# Patient Record
Sex: FEMALE | Race: WHITE | Employment: OTHER | URBAN - METROPOLITAN AREA
[De-identification: names, ages, dates, MRNs, and addresses within clinical notes are randomized per-mention and may not be internally consistent; named-entity substitution may affect disease eponyms.]

---

## 2017-01-01 ENCOUNTER — TRANSFERRED RECORDS (OUTPATIENT)
Dept: HEALTH INFORMATION MANAGEMENT | Facility: CLINIC | Age: 72
End: 2017-01-01

## 2017-01-01 ENCOUNTER — TELEPHONE (OUTPATIENT)
Dept: FAMILY MEDICINE | Facility: CLINIC | Age: 72
End: 2017-01-01

## 2017-01-01 ENCOUNTER — OFFICE VISIT (OUTPATIENT)
Dept: FAMILY MEDICINE | Facility: CLINIC | Age: 72
End: 2017-01-01
Payer: COMMERCIAL

## 2017-01-01 VITALS
BODY MASS INDEX: 18.61 KG/M2 | RESPIRATION RATE: 20 BRPM | HEART RATE: 106 BPM | DIASTOLIC BLOOD PRESSURE: 76 MMHG | HEIGHT: 63 IN | OXYGEN SATURATION: 95 % | SYSTOLIC BLOOD PRESSURE: 144 MMHG | TEMPERATURE: 98.5 F | WEIGHT: 105 LBS

## 2017-01-01 DIAGNOSIS — H61.21 EXCESSIVE EAR WAX, RIGHT: Primary | ICD-10-CM

## 2017-01-01 PROCEDURE — 69209 REMOVE IMPACTED EAR WAX UNI: CPT | Mod: RT | Performed by: FAMILY MEDICINE

## 2017-02-09 ENCOUNTER — TRANSFERRED RECORDS (OUTPATIENT)
Dept: HEALTH INFORMATION MANAGEMENT | Facility: CLINIC | Age: 72
End: 2017-02-09

## 2017-05-15 DIAGNOSIS — M25.562 LEFT KNEE PAIN, UNSPECIFIED CHRONICITY: Primary | ICD-10-CM

## 2017-05-15 NOTE — TELEPHONE ENCOUNTER
Reason for Call:  Medication or medication refill: ketoprofen 10% in PLO 10%    Do you use a [facility]?  Name of the pharmacy and phone number for the current request:  [facility] [place] - [phone]    Name of the medication requested: ketoprofen 10% in PLO 10%    Other request: NA    Can we leave a detailed message on this number? YES    Phone number patient can be reached at: Home number on file [phone] (home)    Best Time: anytime    Call taken on 5/15/2017 at 9:18 AM by Maverick Montano

## 2017-05-17 NOTE — TELEPHONE ENCOUNTER
ketoprofen 10% in PLO 10%  Last cr was low. Needs an annual cr for this prot.  Routing to pcp.  Signed a future lab.  [facility]    Last Written Prescription Date: 3/12/15  Last Fill Quantity: 30 g, # refills: 3  Last Office Visit with [facility], P or [facility] prescribing provider: 7/8/15       Creatinine   Date Value Ref Range Status   05/13/2015 0.47 (L) 0.52 - 1.04 mg/dL Final

## 2017-06-15 ENCOUNTER — HOSPITAL ENCOUNTER (OUTPATIENT)
Dept: MAMMOGRAPHY | Facility: CLINIC | Age: 72
Discharge: HOME | End: 2017-06-15
Admitting: NURSE PRACTITIONER
Payer: COMMERCIAL

## 2017-06-15 DIAGNOSIS — Z00.00 PREVENTATIVE HEALTH CARE: ICD-10-CM

## 2017-06-15 PROCEDURE — G0202 SCR MAMMO BI INCL CAD: HCPCS

## 2017-07-06 ENCOUNTER — TELEPHONE (OUTPATIENT)
Dept: FAMILY MEDICINE | Facility: CLINIC | Age: 72
End: 2017-07-06

## 2017-07-06 NOTE — TELEPHONE ENCOUNTER
Patient called and spoke with writer.    Per patient:  1. Has appt on 7/11/17  2. Rash around ankles   A. Noticed rash yesterday-small red area   B. Spread a little bit   C. No open areas, no itching, no pain  3. Did change gupta detergent about 1 week ago  4. Noticed rash after wearing socks  5. Has COPD so would like a follow up for that   A. Cough from COPD.  It is not worsening nor increasing in frequency    Writer offered patient multiple appts tomorrow and patient declined, stated 7/11/17 works best for her to be seen.    Writer recommended:  1. Switch to a very mild, unscented laundry detergent  2. Can take an anti-histamine, such as Benadryl.  Follow medication label instructions and do not drive after taking this medication as it can be sedating  3. Call with any new, changing or worsening symptoms    Patient verbalized understanding and in agreement with plan.      Dr. Nelson-Please review.  Writer wanted to make you aware of patient being offered earlier appt.    Thank you!  INGA Ramos, KAUSHALN, RN

## 2017-07-11 ENCOUNTER — OFFICE VISIT (OUTPATIENT)
Dept: FAMILY MEDICINE | Facility: CLINIC | Age: 72
End: 2017-07-11
Payer: COMMERCIAL

## 2017-07-11 VITALS
HEART RATE: 72 BPM | SYSTOLIC BLOOD PRESSURE: 132 MMHG | OXYGEN SATURATION: 97 % | DIASTOLIC BLOOD PRESSURE: 68 MMHG | TEMPERATURE: 98.4 F | WEIGHT: 110.25 LBS | BODY MASS INDEX: 19.53 KG/M2

## 2017-07-11 DIAGNOSIS — I10 HTN, GOAL BELOW 140/80: ICD-10-CM

## 2017-07-11 DIAGNOSIS — J44.9 CHRONIC OBSTRUCTIVE PULMONARY DISEASE, UNSPECIFIED COPD TYPE (H): Primary | ICD-10-CM

## 2017-07-11 DIAGNOSIS — L98.9 SKIN LESION: ICD-10-CM

## 2017-07-11 PROCEDURE — 36415 COLL VENOUS BLD VENIPUNCTURE: CPT | Performed by: FAMILY MEDICINE

## 2017-07-11 PROCEDURE — 80048 BASIC METABOLIC PNL TOTAL CA: CPT | Performed by: FAMILY MEDICINE

## 2017-07-11 PROCEDURE — 99214 OFFICE O/P EST MOD 30 MIN: CPT | Performed by: FAMILY MEDICINE

## 2017-07-11 RX ORDER — CHLORTHALIDONE 25 MG/1
12.5 TABLET ORAL DAILY
Qty: 15 TABLET | Refills: 1 | Status: SHIPPED | OUTPATIENT
Start: 2017-07-11 | End: 2018-01-01

## 2017-07-11 ASSESSMENT — ANXIETY QUESTIONNAIRES
GAD7 TOTAL SCORE: 1
6. BECOMING EASILY ANNOYED OR IRRITABLE: NOT AT ALL
3. WORRYING TOO MUCH ABOUT DIFFERENT THINGS: SEVERAL DAYS
IF YOU CHECKED OFF ANY PROBLEMS ON THIS QUESTIONNAIRE, HOW DIFFICULT HAVE THESE PROBLEMS MADE IT FOR YOU TO DO YOUR WORK, TAKE CARE OF THINGS AT HOME, OR GET ALONG WITH OTHER PEOPLE: NOT DIFFICULT AT ALL
7. FEELING AFRAID AS IF SOMETHING AWFUL MIGHT HAPPEN: NOT AT ALL
5. BEING SO RESTLESS THAT IT IS HARD TO SIT STILL: NOT AT ALL
2. NOT BEING ABLE TO STOP OR CONTROL WORRYING: NOT AT ALL
1. FEELING NERVOUS, ANXIOUS, OR ON EDGE: NOT AT ALL

## 2017-07-11 ASSESSMENT — PATIENT HEALTH QUESTIONNAIRE - PHQ9: 5. POOR APPETITE OR OVEREATING: NOT AT ALL

## 2017-07-11 NOTE — MR AVS SNAPSHOT
"              After Visit Summary   2017    Kristine L Soulier    MRN: [id number]           Patient Information     Date Of Birth          1945        Visit Information        Provider Department      2017 10:40 AM Carlos Alberto Nelson MD [facility] [place]        Today's Diagnoses     Chronic obstructive pulmonary disease, unspecified COPD type (H)    -  1    HTN, goal below 140/80        Skin lesion           Follow-ups after your visit        Who to contact     If you have questions or need follow up information about today's clinic visit or your schedule please contact [facility] directly at [phone].  Normal or non-critical lab and imaging results will be communicated to you by MyChart, letter or phone within 4 business days after the clinic has received the results. If you do not hear from us within 7 days, please contact the clinic through MyChart or phone. If you have a critical or abnormal lab result, we will notify you by phone as soon as possible.  Submit refill requests through [employer] or call your pharmacy and they will forward the refill request to us. Please allow 3 business days for your refill to be completed.          Additional Information About Your Visit        MyChart Information     [employer] lets you send messages to your doctor, view your test results, renew your prescriptions, schedule appointments and more. To sign up, go to [email or web address] . Click on \"Log in\" on the left side of the screen, which will take you to the Welcome page. Then click on \"Sign up Now\" on the right side of the page.     You will be asked to enter the access code listed below, as well as some personal information. Please follow the directions to create your username and password.     Your access code is: [email or web address]  Expires: 10/11/2017  7:49 PM     Your access code will  in 90 days. If you need help or a new code, please call your [facility] or " [phone].        Care EveryWhere ID     This is your Care EveryWhere ID. This could be used by other organizations to access your [facility] medical records  [id number]        Your Vitals Were     Pulse Temperature Pulse Oximetry BMI (Body Mass Index)          72 98.4  F (36.9  C) (Oral) 97% 19.53 kg/m2         Blood Pressure from Last 3 Encounters:   07/11/17 132/68   07/08/15 122/64   05/18/15 134/74    Weight from Last 3 Encounters:   07/11/17 110 lb 4 oz (50 kg)   07/08/15 128 lb (58.1 kg)   05/18/15 129 lb 8 oz (58.7 kg)              We Performed the Following     Basic metabolic panel          Where to get your medicines      These medications were sent to [facility] [address]  [address] [facility] [address]     Phone:  [phone]     chlorthalidone 25 MG tablet          Primary Care Provider Office Phone # Fax #    Mayra PERRY [facility] [phone]       [facility] [address]  [facility] [address]        Goals        General    Medication 1 (pt-stated)     Notes - Note created  9/11/2014 11:39 AM by Clarice Stark, RN    I will get Flu shot in October 2014 .    As of today's date 9/11/2014 goal is met at 0 - 25%.   Goal Status:  Active        Equal Access to Services     [facility] AH: Hadii simi Garcia, waaxda luqadaha, qaybta kaalmada amrita, yamel kumar adeean irby. So [facility] [phone].    ATENCIÓN: Si habla español, tiene a grover disposición servicios gratuitos de asistencia lingüística. Llame al [phone].    We comply with applicable federal civil rights laws and [place] laws. We do not discriminate on the basis of race, color, national origin, age, disability sex, sexual orientation or gender identity.            Thank you!     Thank you for choosing [facility]  for your care. Our goal is always to provide you with excellent care. Hearing back from our  patients is one way we can continue to improve our services. Please take a few minutes to complete the written survey that you may receive in the mail after your visit with us. Thank you!             Your Updated Medication List - Protect others around you: Learn how to safely use, store and throw away your medicines at www.disposemymeds.org.          This list is accurate as of: 7/11/17 11:59 PM.  Always use your most recent med list.                   Brand Name Dispense Instructions for use Diagnosis    ACETAMINOPHEN PO           ARAVA 10 MG tablet   Generic drug:  leflunomide      20 mg daily    RA (rheumatoid arthritis) (H)       BROVANA 15 MCG/2ML Nebu neb solution   Generic drug:  arformoterol      Take 15 mcg by nebulization 2 times daily        budesonide 0.5 MG/2ML neb solution    PULMICORT     Take 0.5 mg by nebulization 2 times daily        calcium carbonate-vitamin D 600-200 MG-UNIT Tabs      Take 2 tablets by mouth daily        chlorthalidone 25 MG tablet    HYGROTON    15 tablet    Take 0.5 tablets (12.5 mg) by mouth daily    HTN, goal below 140/80       cyclobenzaprine 5 MG tablet    FLEXERIL    42 tablet    Take 1 tablet (5 mg) by mouth 3 times daily as needed for muscle spasms    Strain of lumbar paraspinal muscle, initial encounter       ferrous gluconate 324 (38 FE) MG tablet    FERGON    90 tablet    Take 1 tablet (324 mg) by mouth daily (with breakfast)    Acute posthemorrhagic anemia       ketoprofen 10% in PLO 10% topical gel     30 g    Apply to affected area (hands and knees) three times a day as needed    Left knee pain, unspecified chronicity       LORazepam 0.5 MG tablet    ATIVAN    30 tablet    Take up to 2-3 times a week as needed for anxiety    Anxiety       * order for DME     1 Device    Equipment being ordered: Nebulizer machine and supplies  [facility]  [phone] Fax: [phone]    SOB (shortness of breath), COPD exacerbation (H)       * order for DME     1 Device     Equipment being ordered: Oxygen - portable O2 tank.  3 Liters per minute.    COPD (chronic obstructive pulmonary disease) (H), Community acquired pneumonia       * order for DME     1 Device    Equipment being ordered: Oxygen portable, O2 delivered at 3L and may wean O2 to 2.5 L as tolerated    COPD (chronic obstructive pulmonary disease) (H), Pneumonia       tiotropium 18 MCG capsule    SPIRIVA HANDIHALER    30 capsule    Inhale contents of one capsule daily.    COPD (chronic obstructive pulmonary disease) (H)       VITAMIN D PO      Take 2 tablets by mouth daily Strength unknown        * Notice:  This list has 3 medication(s) that are the same as other medications prescribed for you. Read the directions carefully, and ask your doctor or other care provider to review them with you.

## 2017-07-11 NOTE — LETTER
[facility]   [address] [place]   [address]  [phone]    July 14, 2017      Kristine L Soulier  [address]   [facility] [address]              Dear Ms. Soulier,    Your kidney function is OK. Glucose is low as you were fasting.    Results for orders placed or performed in visit on 07/11/17   Basic metabolic panel   Result Value Ref Range    Sodium 133 133 - 144 mmol/L    Potassium 4.1 3.4 - 5.3 mmol/L    Chloride 94 94 - 109 mmol/L    Carbon Dioxide 37 (H) 20 - 32 mmol/L    Anion Gap 2 (L) 3 - 14 mmol/L    Glucose 51 (L) 70 - 99 mg/dL    Urea Nitrogen 10 7 - 30 mg/dL    Creatinine 0.48 (L) 0.52 - 1.04 mg/dL    GFR Estimate >90  Non  GFR Calc   >60 mL/min/1.7m2    GFR Estimate If Black >90   GFR Calc   >60 mL/min/1.7m2    Calcium 9.1 8.5 - 10.1 mg/dL           Sincerely,    Carlos Alberto Nelson MD/nr

## 2017-07-11 NOTE — NURSING NOTE
"Chief Complaint   Patient presents with     Derm Problem     COPD       Initial /68 (Cuff Size: Adult Regular)  Pulse 72  Temp 98.4  F (36.9  C) (Oral)  Wt 110 lb 4 oz (50 kg)  SpO2 97%  BMI 19.53 kg/m2 Estimated body mass index is 19.53 kg/(m^2) as calculated from the following:    Height as of 5/18/15: 5' 3\" (1.6 m).    Weight as of this encounter: 110 lb 4 oz (50 kg).  Medication Reconciliation: complete     Zonia Mcgovern CMA      "

## 2017-07-11 NOTE — PROGRESS NOTES
SUBJECTIVE:                                                    Kristine L Soulier is a 72 year old female who presents to clinic today for the following health issues:      Rash      Duration: couple days ago     Description  Location: around both ankles, noticed after putting on socks after changing new laundry detergent. But going away    Itching: no    Intensity:  mild    Accompanying signs and symptoms: small, red, spreading a little bit    History (similar episodes/previous evaluation): None    Precipitating or alleviating factors:  New exposures:  clothes detergant 1 week ago  Recent travel: no      Therapies tried and outcome: none    COPD Follow-Up    Symptoms are currently: stable    Current fatigue or dyspnea with ambulation: worsened from baseline    Shortness of breath: stable    Increased or change in Cough/Sputum: Yes-  both    Fever(s): No    Baseline ambulation without stopping to rest 1-2 feet, blocks. Able to walk up 1 flights of stairs without stopping to rest.    Any ER/UC or hospital admissions since your last visit? No     Additional: has appt with her pulmonologist next week     1.5 L oxygen. Seeing lung specialst. No major changes in breathing.     Has bp machine. Takes bp med only if her bp is high. Sometime she can feel it.     History   Smoking Status     Former Smoker     Packs/day: 0.25     Years: 20.00     Types: Cigarettes     Quit date: 1/29/2014   Smokeless Tobacco     Former User     No results found for: FEV1, [id number]    Problem list and histories reviewed & adjusted, as indicated.  Additional history: as documented    Labs reviewed in EPIC    Reviewed and updated as needed this visit by clinical staff       Reviewed and updated as needed this visit by Provider           Social History     Social History     Marital status:      Spouse name: N/A     Number of children: 5     Years of education: 12     Occupational History     retired       Self     Social History Main  Topics     Smoking status: Former Smoker     Packs/day: 0.25     Years: 20.00     Types: Cigarettes     Quit date: 1/29/2014     Smokeless tobacco: Former User     Alcohol use No     Drug use: No     Sexual activity: Not Currently     Other Topics Concern     Parent/Sibling W/ Cabg, Mi Or Angioplasty Before 65f 55m? No     Social History Narrative    Balanced Diet - tries to    Osteoporosis Prevention Measures - Dairy servings per day: 3-4    Regular Exercise -  No Describe no    Dental Exam - YES - Date: 10/09    Eye Exam - YES - Date: about a year ago    Self Breast Exam - she tries to    Abuse: Current or Past (Physical, Sexual or Emotional)- No    Do you feel safe in your environment - Yes    Guns stored in the home - No    Sunscreen used - No    Seatbelts used - Yes    Lipids -  YES - Date: years ago, was on lipitor for awhile    Glucose -  NO    Colon Cancer Screening - No, has not had a colonoscopy done before and she does not want to have one done    Hemoccults - NO    Pap Test -  YES - Date: years ago    Do you have any concerns about STD's -  No    Mammography - YES - Date: years    DEXA - NO    Immunizations reviewed and up to date - last td given years ago    Rowena Parmar ma  2009                 No Known Allergies  Patient Active Problem List   Diagnosis     CARDIOVASCULAR SCREENING; LDL GOAL LESS THAN 130     Seasonal allergic rhinitis     Advanced directives, counseling/discussion     Enlarged lymph nodes     COPD (chronic obstructive pulmonary disease) (H)     Community acquired pneumonia     Health Care Home     Anxiety     HTN, goal below 140/80     Hilar adenopathy     Abdominal pain, generalized     RA (rheumatoid arthritis) (H)     Reviewed medications, social history and  past medical and surgical history.    Review of system: for general, respiratory, CVS, GI and psychiatry negative except for noted above.     EXAM:  /68 (Cuff Size: Adult Regular)  Pulse 72  Temp 98.4  F (36.9  C)  (Oral)  Wt 110 lb 4 oz (50 kg)  SpO2 97%  BMI 19.53 kg/m2  Constitutional: healthy, alert and no distress   Psychiatric: mentation appears normal and affect normal/bright  Respiratory: on nasal canula oxygen   SKIN: few petechia on right medial lower shin, non tender, no papular lesions    ASSESSMENT / PLAN:  (J44.9) Chronic obstructive pulmonary disease, unspecified COPD type (H)  (primary encounter diagnosis)  Comment:    Plan: seeing pulmonologist. Oxygen dependent.     (I10) HTN, goal below 140/80  Comment:  Patient is tolerating current medication without any major side effects of concerns and current dose seems reasonable too.  Current medication regime is effective. Continue current treatment without any changes.   Plan: chlorthalidone (HYGROTON) 25 MG tablet, Basic         metabolic panel             (L98.9) Skin lesion  Comment: small lesion and improving.   Plan: monitor. If recurs, check platelet.

## 2017-07-12 LAB
ANION GAP SERPL CALCULATED.3IONS-SCNC: 2 MMOL/L (ref 3–14)
BUN SERPL-MCNC: 10 MG/DL (ref 7–30)
CALCIUM SERPL-MCNC: 9.1 MG/DL (ref 8.5–10.1)
CHLORIDE SERPL-SCNC: 94 MMOL/L (ref 94–109)
CO2 SERPL-SCNC: 37 MMOL/L (ref 20–32)
CREAT SERPL-MCNC: 0.48 MG/DL (ref 0.52–1.04)
GFR SERPL CREATININE-BSD FRML MDRD: ABNORMAL ML/MIN/1.7M2
GLUCOSE SERPL-MCNC: 51 MG/DL (ref 70–99)
POTASSIUM SERPL-SCNC: 4.1 MMOL/L (ref 3.4–5.3)
SODIUM SERPL-SCNC: 133 MMOL/L (ref 133–144)

## 2017-07-12 ASSESSMENT — PATIENT HEALTH QUESTIONNAIRE - PHQ9: SUM OF ALL RESPONSES TO PHQ QUESTIONS 1-9: 0

## 2017-07-12 ASSESSMENT — ANXIETY QUESTIONNAIRES: GAD7 TOTAL SCORE: 1

## 2017-10-18 NOTE — TELEPHONE ENCOUNTER
Patient calling to find out if she needs another pneumonia shot.]  Per [facility] she is complete.    Emmy Ovalle RN

## 2017-11-03 NOTE — PROGRESS NOTES
SUBJECTIVE:   Kristine L Soulier is a 72 year old female who presents to clinic today for the following health issues:    Ear problem       Duration: 4 days    Description (location/character/radiation): right ear    Intensity:  severe    Accompanying signs and symptoms: no    History (similar episodes/previous evaluation): None    Precipitating or alleviating factors: None    Therapies tried and outcome: None     Right ear - no injury. sometime qtip use.   Sometime feelings of fluids in ear. Does not hurt.  Warm compression helps.     Problem list and histories reviewed & adjusted, as indicated.  Additional history: as documented    Labs reviewed in EPIC    Reviewed and updated as needed this visit by clinical staff     Reviewed and updated as needed this visit by Provider           Social History     Social History     Marital status:      Spouse name: N/A     Number of children: 5     Years of education: 12     Occupational History     retired       Self     Social History Main Topics     Smoking status: Former Smoker     Packs/day: 0.25     Years: 20.00     Types: Cigarettes     Quit date: 1/29/2014     Smokeless tobacco: Former User     Alcohol use No     Drug use: No     Sexual activity: Not Currently     Other Topics Concern     Parent/Sibling W/ Cabg, Mi Or Angioplasty Before 65f 55m? No     Social History Narrative    Balanced Diet - tries to    Osteoporosis Prevention Measures - Dairy servings per day: 3-4    Regular Exercise -  No Describe no    Dental Exam - YES - Date: 10/09    Eye Exam - YES - Date: about a year ago    Self Breast Exam - she tries to    Abuse: Current or Past (Physical, Sexual or Emotional)- No    Do you feel safe in your environment - Yes    Guns stored in the home - No    Sunscreen used - No    Seatbelts used - Yes    Lipids -  YES - Date: years ago, was on lipitor for awhile    Glucose -  NO    Colon Cancer Screening - No, has not had a colonoscopy done before and she does  "not want to have one done    Hemoccults - NO    Pap Test -  YES - Date: years ago    Do you have any concerns about STD's -  No    Mammography - YES - Date: years    DEXA - NO    Immunizations reviewed and up to date - last td given years ago    Rowena Parmar ma  2009                 No Known Allergies  Patient Active Problem List   Diagnosis     CARDIOVASCULAR SCREENING; LDL GOAL LESS THAN 130     Seasonal allergic rhinitis     Advanced directives, counseling/discussion     Enlarged lymph nodes     COPD (chronic obstructive pulmonary disease) (H)     Community acquired pneumonia     Health Care Home     Anxiety     HTN, goal below 140/80     Hilar adenopathy     Abdominal pain, generalized     RA (rheumatoid arthritis) (H)     Reviewed medications, social history and  past medical and surgical history.    Review of system: for general, respiratory, CVS, GI and psychiatry negative except for noted above.     EXAM:  /76  Pulse 106  Temp 98.5  F (36.9  C) (Oral)  Resp 20  Ht 5' 3\" (1.6 m)  Wt 105 lb (47.6 kg)  SpO2 95%  BMI 18.6 kg/m2  Constitutional: healthy, alert and no distress   Psychiatric: mentation appears normal and affect normal/bright  ENT - ear canal full of ear wax on right side and TM is not visualized due to that.  minimal wax on left side TM clear.     ASSESSMENT / PLAN:  (H61.21) Excessive ear wax, right  (primary encounter diagnosis)  Comment: ear wash by MA.  Plan: HC REMOVAL IMPACTED CERUMEN IRRIGATION/LVG         UNILAT              "

## 2017-11-03 NOTE — NURSING NOTE
"Chief Complaint   Patient presents with     Ear Problem       Initial /76  Pulse 106  Temp 98.5  F (36.9  C) (Oral)  Resp 20  Ht 5' 3\" (1.6 m)  Wt 105 lb (47.6 kg)  SpO2 95%  BMI 18.6 kg/m2 Estimated body mass index is 18.6 kg/(m^2) as calculated from the following:    Height as of this encounter: 5' 3\" (1.6 m).    Weight as of this encounter: 105 lb (47.6 kg).  Medication Reconciliation: complete   Henrietta Goetz MA      "

## 2017-11-03 NOTE — NURSING NOTE
Kristine L Soulier is a 72 year old female who presents in clinic with complaint of impacted ear wax (cerumen).  Per the order of Dr Nelson, ear wax was removed from right side by flushing with warm water and 3% peroxide solution and manual debridement has not been performed. Patient denies pain/dizziness/discharge/drainage  (if yes, stop procedure and huddle with provider).  Ear wax has been successfully removed. (If not, huddle with provider).   Zonia Mcgovern

## 2017-11-03 NOTE — MR AVS SNAPSHOT
"              After Visit Summary   11/3/2017    Kristine L Soulier    MRN: [id number]           Patient Information     Date Of Birth          1945        Visit Information        Provider Department      11/3/2017 11:00 AM Carlos Alberto Nelson MD [facility]        Today's Diagnoses     Excessive ear wax, right    -  1       Follow-ups after your visit        Who to contact     If you have questions or need follow up information about today's clinic visit or your schedule please contact [facility] directly at [phone].  Normal or non-critical lab and imaging results will be communicated to you by FlightCasterhart, letter or phone within 4 business days after the clinic has received the results. If you do not hear from us within 7 days, please contact the clinic through FlightCasterhart or phone. If you have a critical or abnormal lab result, we will notify you by phone as soon as possible.  Submit refill requests through LawDeck or call your pharmacy and they will forward the refill request to us. Please allow 3 business days for your refill to be completed.          Additional Information About Your Visit        MyChart Information     LawDeck lets you send messages to your doctor, view your test results, renew your prescriptions, schedule appointments and more. To sign up, go to [email or web address] . Click on \"Log in\" on the left side of the screen, which will take you to the Welcome page. Then click on \"Sign up Now\" on the right side of the page.     You will be asked to enter the access code listed below, as well as some personal information. Please follow the directions to create your username and password.     Your access code is: [email or web address]  Expires: 2018  1:46 PM     Your access code will  in 90 days. If you need help or a new code, please call your [facility] or [phone].        Care EveryWhere ID     This is your Care EveryWhere ID. This could be used by " "other organizations to access your [place] medical records  [id number]        Your Vitals Were     Pulse Temperature Respirations Height Pulse Oximetry BMI (Body Mass Index)    106 98.5  F (36.9  C) (Oral) 20 5' 3\" (1.6 m) 95% 18.6 kg/m2       Blood Pressure from Last 3 Encounters:   11/03/17 144/76   07/11/17 132/68   07/08/15 122/64    Weight from Last 3 Encounters:   11/03/17 105 lb (47.6 kg)   07/11/17 110 lb 4 oz (50 kg)   07/08/15 128 lb (58.1 kg)              We Performed the Following     HC REMOVAL IMPACTED CERUMEN IRRIGATION/LVG UNILAT        Primary Care Provider Office Phone # Fax #    Mayra PERRY Perdomo [phone] [address]  [facility] [address]        Goals        General    Medication 1 (pt-stated)     Notes - Note created  9/11/2014 11:39 AM by Clarice Stark, RN    I will get Flu shot in October 2014 .    As of today's date 9/11/2014 goal is met at 0 - 25%.   Goal Status:  Active        Equal Access to Services     [facility]: Hadii simi lou hadchantello Socitlali, waaxda luqadaha, qaybta kaalmada adeegyada, yamel irby. So [facility] [phone].    ATENCIÓN: Si habla español, tiene a grover disposición servicios gratuitos de asistencia lingüística. Shady al [phone].    We comply with applicable federal civil rights laws and [place] laws. We do not discriminate on the basis of race, color, national origin, age, disability, sex, sexual orientation, or gender identity.            Thank you!     Thank you for choosing [facility]  for your care. Our goal is always to provide you with excellent care. Hearing back from our patients is one way we can continue to improve our services. Please take a few minutes to complete the written survey that you may receive in the mail after your visit with us. Thank you!             Your Updated Medication List - Protect others around you: Learn how to safely use, store and throw away your " medicines at www.disposemymeds.org.          This list is accurate as of: 11/3/17 11:59 PM.  Always use your most recent med list.                   Brand Name Dispense Instructions for use Diagnosis    ACETAMINOPHEN PO           ARAVA 10 MG tablet   Generic drug:  leflunomide      20 mg daily    RA (rheumatoid arthritis) (H)       BROVANA 15 MCG/2ML Nebu neb solution   Generic drug:  arformoterol      Take 15 mcg by nebulization 2 times daily        budesonide 0.5 MG/2ML neb solution    PULMICORT     Take 0.5 mg by nebulization 2 times daily        calcium carbonate-vitamin D 600-200 MG-UNIT Tabs      Take 2 tablets by mouth daily        chlorthalidone 25 MG tablet    HYGROTON    15 tablet    Take 0.5 tablets (12.5 mg) by mouth daily    HTN, goal below 140/80       cyclobenzaprine 5 MG tablet    FLEXERIL    42 tablet    Take 1 tablet (5 mg) by mouth 3 times daily as needed for muscle spasms    Strain of lumbar paraspinal muscle, initial encounter       ferrous gluconate 324 (38 FE) MG tablet    FERGON    90 tablet    Take 1 tablet (324 mg) by mouth daily (with breakfast)    Acute posthemorrhagic anemia       ketoprofen 10% in PLO 10% topical gel     30 g    Apply to affected area (hands and knees) three times a day as needed    Left knee pain, unspecified chronicity       LORazepam 0.5 MG tablet    ATIVAN    30 tablet    Take up to 2-3 times a week as needed for anxiety    Anxiety       * order for DME     1 Device    Equipment being ordered: Nebulizer machine and supplies  [facility]  [phone] Fax: [phone]    SOB (shortness of breath), COPD exacerbation (H)       * order for DME     1 Device    Equipment being ordered: Oxygen - portable O2 tank.  3 Liters per minute.    COPD (chronic obstructive pulmonary disease) (H), Community acquired pneumonia       * order for DME     1 Device    Equipment being ordered: Oxygen portable, O2 delivered at 3L and may wean O2 to 2.5 L as tolerated    COPD (chronic  obstructive pulmonary disease) (H), Pneumonia       tiotropium 18 MCG capsule    SPIRIVA HANDIHALER    30 capsule    Inhale contents of one capsule daily.    COPD (chronic obstructive pulmonary disease) (H)       VITAMIN D PO      Take 2 tablets by mouth daily Strength unknown        * Notice:  This list has 3 medication(s) that are the same as other medications prescribed for you. Read the directions carefully, and ask your doctor or other care provider to review them with you.

## 2018-01-01 ENCOUNTER — PATIENT OUTREACH (OUTPATIENT)
Dept: CARE COORDINATION | Facility: CLINIC | Age: 73
End: 2018-01-01

## 2018-01-01 ENCOUNTER — OFFICE VISIT (OUTPATIENT)
Dept: FAMILY MEDICINE | Facility: CLINIC | Age: 73
End: 2018-01-01
Payer: COMMERCIAL

## 2018-01-01 ENCOUNTER — HOSPITAL ENCOUNTER (OUTPATIENT)
Facility: CLINIC | Age: 73
Setting detail: OBSERVATION
Discharge: HOME | End: 2018-06-18
Attending: EMERGENCY MEDICINE | Admitting: INTERNAL MEDICINE
Payer: COMMERCIAL

## 2018-01-01 ENCOUNTER — APPOINTMENT (OUTPATIENT)
Dept: PHYSICAL THERAPY | Facility: CLINIC | Age: 73
DRG: 190 | End: 2018-01-01
Attending: INTERNAL MEDICINE
Payer: COMMERCIAL

## 2018-01-01 ENCOUNTER — TELEPHONE (OUTPATIENT)
Dept: FAMILY MEDICINE | Facility: CLINIC | Age: 73
End: 2018-01-01

## 2018-01-01 ENCOUNTER — APPOINTMENT (OUTPATIENT)
Dept: CT IMAGING | Facility: CLINIC | Age: 73
DRG: 193 | End: 2018-01-01
Attending: HOSPITALIST
Payer: COMMERCIAL

## 2018-01-01 ENCOUNTER — APPOINTMENT (OUTPATIENT)
Dept: GENERAL RADIOLOGY | Facility: CLINIC | Age: 73
DRG: 193 | End: 2018-01-01
Attending: EMERGENCY MEDICINE
Payer: COMMERCIAL

## 2018-01-01 ENCOUNTER — APPOINTMENT (OUTPATIENT)
Dept: CARDIOLOGY | Facility: CLINIC | Age: 73
DRG: 193 | End: 2018-01-01
Attending: HOSPITALIST
Payer: COMMERCIAL

## 2018-01-01 ENCOUNTER — APPOINTMENT (OUTPATIENT)
Dept: CARDIOLOGY | Facility: CLINIC | Age: 73
DRG: 190 | End: 2018-01-01
Attending: INTERNAL MEDICINE
Payer: COMMERCIAL

## 2018-01-01 ENCOUNTER — APPOINTMENT (OUTPATIENT)
Dept: CARDIOLOGY | Facility: CLINIC | Age: 73
End: 2018-01-01
Attending: INTERNAL MEDICINE
Payer: COMMERCIAL

## 2018-01-01 ENCOUNTER — APPOINTMENT (OUTPATIENT)
Dept: CT IMAGING | Facility: CLINIC | Age: 73
DRG: 190 | End: 2018-01-01
Attending: EMERGENCY MEDICINE
Payer: COMMERCIAL

## 2018-01-01 ENCOUNTER — APPOINTMENT (OUTPATIENT)
Dept: PHYSICAL THERAPY | Facility: CLINIC | Age: 73
DRG: 193 | End: 2018-01-01
Attending: INTERNAL MEDICINE
Payer: COMMERCIAL

## 2018-01-01 ENCOUNTER — APPOINTMENT (OUTPATIENT)
Dept: PHYSICAL THERAPY | Facility: CLINIC | Age: 73
DRG: 193 | End: 2018-01-01
Payer: COMMERCIAL

## 2018-01-01 ENCOUNTER — HOSPITAL ENCOUNTER (INPATIENT)
Facility: CLINIC | Age: 73
LOS: 3 days | DRG: 189 | End: 2018-07-28
Attending: EMERGENCY MEDICINE | Admitting: INTERNAL MEDICINE
Payer: COMMERCIAL

## 2018-01-01 ENCOUNTER — RADIANT APPOINTMENT (OUTPATIENT)
Dept: GENERAL RADIOLOGY | Facility: CLINIC | Age: 73
End: 2018-01-01
Attending: FAMILY MEDICINE
Payer: COMMERCIAL

## 2018-01-01 ENCOUNTER — HOSPITAL ENCOUNTER (INPATIENT)
Facility: CLINIC | Age: 73
LOS: 3 days | Discharge: HOME | DRG: 193 | End: 2018-05-17
Attending: EMERGENCY MEDICINE | Admitting: INTERNAL MEDICINE
Payer: COMMERCIAL

## 2018-01-01 ENCOUNTER — HOSPITAL ENCOUNTER (INPATIENT)
Facility: CLINIC | Age: 73
LOS: 6 days | Discharge: HOME HEALTH | DRG: 190 | End: 2018-07-12
Attending: EMERGENCY MEDICINE | Admitting: INTERNAL MEDICINE
Payer: COMMERCIAL

## 2018-01-01 ENCOUNTER — DOCUMENTATION ONLY (OUTPATIENT)
Dept: CARE COORDINATION | Facility: CLINIC | Age: 73
End: 2018-01-01

## 2018-01-01 ENCOUNTER — APPOINTMENT (OUTPATIENT)
Dept: GENERAL RADIOLOGY | Facility: CLINIC | Age: 73
End: 2018-01-01
Attending: EMERGENCY MEDICINE
Payer: COMMERCIAL

## 2018-01-01 ENCOUNTER — APPOINTMENT (OUTPATIENT)
Dept: GENERAL RADIOLOGY | Facility: CLINIC | Age: 73
DRG: 189 | End: 2018-01-01
Attending: EMERGENCY MEDICINE
Payer: COMMERCIAL

## 2018-01-01 ENCOUNTER — APPOINTMENT (OUTPATIENT)
Dept: PHYSICAL THERAPY | Facility: CLINIC | Age: 73
DRG: 190 | End: 2018-01-01
Payer: COMMERCIAL

## 2018-01-01 ENCOUNTER — TRANSFERRED RECORDS (OUTPATIENT)
Dept: HEALTH INFORMATION MANAGEMENT | Facility: CLINIC | Age: 73
End: 2018-01-01

## 2018-01-01 VITALS
TEMPERATURE: 98.3 F | RESPIRATION RATE: 20 BRPM | BODY MASS INDEX: 18.69 KG/M2 | SYSTOLIC BLOOD PRESSURE: 136 MMHG | HEIGHT: 63 IN | HEART RATE: 112 BPM | OXYGEN SATURATION: 90 % | WEIGHT: 105.5 LBS | DIASTOLIC BLOOD PRESSURE: 70 MMHG

## 2018-01-01 VITALS
WEIGHT: 100.75 LBS | HEIGHT: 63 IN | DIASTOLIC BLOOD PRESSURE: 65 MMHG | OXYGEN SATURATION: 99 % | TEMPERATURE: 99 F | RESPIRATION RATE: 16 BRPM | BODY MASS INDEX: 17.85 KG/M2 | HEART RATE: 86 BPM | SYSTOLIC BLOOD PRESSURE: 146 MMHG

## 2018-01-01 VITALS
DIASTOLIC BLOOD PRESSURE: 73 MMHG | OXYGEN SATURATION: 97 % | HEIGHT: 63 IN | SYSTOLIC BLOOD PRESSURE: 157 MMHG | BODY MASS INDEX: 18.71 KG/M2 | WEIGHT: 105.6 LBS | HEART RATE: 86 BPM | RESPIRATION RATE: 16 BRPM | TEMPERATURE: 98.8 F

## 2018-01-01 VITALS
BODY MASS INDEX: 18.16 KG/M2 | SYSTOLIC BLOOD PRESSURE: 142 MMHG | OXYGEN SATURATION: 95 % | DIASTOLIC BLOOD PRESSURE: 68 MMHG | RESPIRATION RATE: 17 BRPM | WEIGHT: 102.5 LBS | TEMPERATURE: 99 F | HEART RATE: 96 BPM

## 2018-01-01 VITALS
TEMPERATURE: 97.9 F | SYSTOLIC BLOOD PRESSURE: 130 MMHG | HEART RATE: 124 BPM | HEIGHT: 63 IN | OXYGEN SATURATION: 73 % | RESPIRATION RATE: 24 BRPM | DIASTOLIC BLOOD PRESSURE: 57 MMHG

## 2018-01-01 VITALS
SYSTOLIC BLOOD PRESSURE: 146 MMHG | HEART RATE: 116 BPM | HEIGHT: 63 IN | DIASTOLIC BLOOD PRESSURE: 77 MMHG | WEIGHT: 102.5 LBS | OXYGEN SATURATION: 85 % | TEMPERATURE: 98.6 F | RESPIRATION RATE: 20 BRPM | BODY MASS INDEX: 18.16 KG/M2

## 2018-01-01 VITALS
DIASTOLIC BLOOD PRESSURE: 68 MMHG | WEIGHT: 99.7 LBS | HEART RATE: 79 BPM | BODY MASS INDEX: 17.66 KG/M2 | SYSTOLIC BLOOD PRESSURE: 137 MMHG | OXYGEN SATURATION: 96 % | HEIGHT: 63 IN | TEMPERATURE: 98.6 F | RESPIRATION RATE: 18 BRPM

## 2018-01-01 VITALS
DIASTOLIC BLOOD PRESSURE: 55 MMHG | HEIGHT: 63 IN | HEART RATE: 84 BPM | SYSTOLIC BLOOD PRESSURE: 126 MMHG | BODY MASS INDEX: 19.1 KG/M2 | WEIGHT: 107.81 LBS | RESPIRATION RATE: 18 BRPM | OXYGEN SATURATION: 90 % | TEMPERATURE: 98.7 F

## 2018-01-01 VITALS
BODY MASS INDEX: 16.76 KG/M2 | HEART RATE: 107 BPM | SYSTOLIC BLOOD PRESSURE: 135 MMHG | WEIGHT: 94.6 LBS | OXYGEN SATURATION: 96 % | HEIGHT: 63 IN | DIASTOLIC BLOOD PRESSURE: 64 MMHG | TEMPERATURE: 98 F | RESPIRATION RATE: 17 BRPM

## 2018-01-01 DIAGNOSIS — J18.9 COMMUNITY ACQUIRED PNEUMONIA, UNSPECIFIED LATERALITY: Primary | ICD-10-CM

## 2018-01-01 DIAGNOSIS — R91.8 BILATERAL PULMONARY INFILTRATES ON CXR: ICD-10-CM

## 2018-01-01 DIAGNOSIS — R06.03 RESPIRATORY DISTRESS: ICD-10-CM

## 2018-01-01 DIAGNOSIS — R79.89 ELEVATED TROPONIN: ICD-10-CM

## 2018-01-01 DIAGNOSIS — F41.9 ANXIETY: ICD-10-CM

## 2018-01-01 DIAGNOSIS — J44.9 END STAGE COPD (H): Primary | ICD-10-CM

## 2018-01-01 DIAGNOSIS — J18.9 COMMUNITY ACQUIRED PNEUMONIA, UNSPECIFIED LATERALITY: ICD-10-CM

## 2018-01-01 DIAGNOSIS — Z99.81 OXYGEN DEPENDENT: ICD-10-CM

## 2018-01-01 DIAGNOSIS — J43.9 PULMONARY EMPHYSEMA, UNSPECIFIED EMPHYSEMA TYPE (H): ICD-10-CM

## 2018-01-01 DIAGNOSIS — I10 HTN, GOAL BELOW 140/80: ICD-10-CM

## 2018-01-01 DIAGNOSIS — J18.9 PNEUMONIA DUE TO INFECTIOUS ORGANISM, UNSPECIFIED LATERALITY, UNSPECIFIED PART OF LUNG: ICD-10-CM

## 2018-01-01 DIAGNOSIS — F41.9 ANXIETY: Primary | ICD-10-CM

## 2018-01-01 DIAGNOSIS — R05.8 PRODUCTIVE COUGH: ICD-10-CM

## 2018-01-01 DIAGNOSIS — J44.9 END STAGE COPD (H): ICD-10-CM

## 2018-01-01 DIAGNOSIS — J44.9 CHRONIC OBSTRUCTIVE PULMONARY DISEASE, UNSPECIFIED COPD TYPE (H): ICD-10-CM

## 2018-01-01 DIAGNOSIS — J44.1 CHRONIC OBSTRUCTIVE PULMONARY DISEASE WITH ACUTE EXACERBATION (H): Primary | ICD-10-CM

## 2018-01-01 DIAGNOSIS — R07.9 CHEST PAIN, UNSPECIFIED TYPE: ICD-10-CM

## 2018-01-01 DIAGNOSIS — J43.9 PULMONARY EMPHYSEMA, UNSPECIFIED EMPHYSEMA TYPE (H): Primary | ICD-10-CM

## 2018-01-01 DIAGNOSIS — J44.1 CHRONIC OBSTRUCTIVE PULMONARY DISEASE WITH ACUTE EXACERBATION (H): ICD-10-CM

## 2018-01-01 DIAGNOSIS — J44.1 COPD EXACERBATION (H): Primary | ICD-10-CM

## 2018-01-01 DIAGNOSIS — R06.03 ACUTE RESPIRATORY DISTRESS: ICD-10-CM

## 2018-01-01 DIAGNOSIS — J44.1 COPD EXACERBATION (H): ICD-10-CM

## 2018-01-01 DIAGNOSIS — J18.9 PNEUMONIA OF BOTH LOWER LOBES DUE TO INFECTIOUS ORGANISM: ICD-10-CM

## 2018-01-01 DIAGNOSIS — I21.4 NSTEMI (NON-ST ELEVATED MYOCARDIAL INFARCTION) (H): ICD-10-CM

## 2018-01-01 DIAGNOSIS — J96.21 ACUTE ON CHRONIC RESPIRATORY FAILURE WITH HYPOXIA (H): ICD-10-CM

## 2018-01-01 DIAGNOSIS — R06.02 SOB (SHORTNESS OF BREATH): Primary | ICD-10-CM

## 2018-01-01 DIAGNOSIS — R09.02 HYPOXIA: Primary | ICD-10-CM

## 2018-01-01 DIAGNOSIS — R93.89 ABNORMAL CHEST X-RAY: ICD-10-CM

## 2018-01-01 LAB
ALBUMIN SERPL-MCNC: 2.4 G/DL (ref 3.4–5)
ALBUMIN SERPL-MCNC: 2.8 G/DL (ref 3.4–5)
ALBUMIN SERPL-MCNC: 2.9 G/DL (ref 3.4–5)
ALBUMIN UR-MCNC: 10 MG/DL
ALBUMIN UR-MCNC: 30 MG/DL
ALP SERPL-CCNC: 74 U/L (ref 40–150)
ALP SERPL-CCNC: 75 U/L (ref 40–150)
ALP SERPL-CCNC: 91 U/L (ref 40–150)
ALT SERPL W P-5'-P-CCNC: 13 U/L (ref 0–50)
ALT SERPL W P-5'-P-CCNC: 19 U/L (ref 0–50)
ALT SERPL W P-5'-P-CCNC: 24 U/L (ref 0–50)
ANION GAP SERPL CALCULATED.3IONS-SCNC: 3 MMOL/L (ref 3–14)
ANION GAP SERPL CALCULATED.3IONS-SCNC: 4 MMOL/L (ref 3–14)
ANION GAP SERPL CALCULATED.3IONS-SCNC: 4 MMOL/L (ref 3–14)
ANION GAP SERPL CALCULATED.3IONS-SCNC: 5 MMOL/L (ref 3–14)
ANION GAP SERPL CALCULATED.3IONS-SCNC: 6 MMOL/L (ref 3–14)
ANION GAP SERPL CALCULATED.3IONS-SCNC: 7 MMOL/L (ref 3–14)
ANION GAP SERPL CALCULATED.3IONS-SCNC: 7 MMOL/L (ref 3–14)
ANION GAP SERPL CALCULATED.3IONS-SCNC: ABNORMAL MMOL/L (ref 3–14)
ANION GAP SERPL CALCULATED.3IONS-SCNC: ABNORMAL MMOL/L (ref 3–14)
APPEARANCE UR: CLEAR
APPEARANCE UR: CLEAR
AST SERPL W P-5'-P-CCNC: 16 U/L (ref 0–45)
AST SERPL W P-5'-P-CCNC: 18 U/L (ref 0–45)
AST SERPL W P-5'-P-CCNC: 18 U/L (ref 0–45)
BACTERIA SPEC CULT: NO GROWTH
BACTERIA SPEC CULT: NORMAL
BASE EXCESS BLDA CALC-SCNC: 14.8 MMOL/L
BASE EXCESS BLDV CALC-SCNC: 12.8 MMOL/L
BASE EXCESS BLDV CALC-SCNC: 14.6 MMOL/L
BASE EXCESS BLDV CALC-SCNC: 20.5 MMOL/L
BASOPHILS # BLD AUTO: 0 10E9/L (ref 0–0.2)
BASOPHILS NFR BLD AUTO: 0 %
BASOPHILS NFR BLD AUTO: 0.1 %
BASOPHILS NFR BLD AUTO: 0.1 %
BASOPHILS NFR BLD AUTO: 0.2 %
BASOPHILS NFR BLD AUTO: 0.4 %
BILIRUB SERPL-MCNC: 0.2 MG/DL (ref 0.2–1.3)
BILIRUB SERPL-MCNC: 0.3 MG/DL (ref 0.2–1.3)
BILIRUB SERPL-MCNC: 0.3 MG/DL (ref 0.2–1.3)
BILIRUB UR QL STRIP: NEGATIVE
BILIRUB UR QL STRIP: NEGATIVE
BUN SERPL-MCNC: 14 MG/DL (ref 7–30)
BUN SERPL-MCNC: 14 MG/DL (ref 7–30)
BUN SERPL-MCNC: 17 MG/DL (ref 7–30)
BUN SERPL-MCNC: 17 MG/DL (ref 7–30)
BUN SERPL-MCNC: 18 MG/DL (ref 7–30)
BUN SERPL-MCNC: 20 MG/DL (ref 7–30)
BUN SERPL-MCNC: 21 MG/DL (ref 7–30)
BUN SERPL-MCNC: 23 MG/DL (ref 7–30)
BUN SERPL-MCNC: 9 MG/DL (ref 7–30)
CALCIUM SERPL-MCNC: 8 MG/DL (ref 8.5–10.1)
CALCIUM SERPL-MCNC: 8.2 MG/DL (ref 8.5–10.1)
CALCIUM SERPL-MCNC: 8.3 MG/DL (ref 8.5–10.1)
CALCIUM SERPL-MCNC: 8.5 MG/DL (ref 8.5–10.1)
CALCIUM SERPL-MCNC: 8.6 MG/DL (ref 8.5–10.1)
CALCIUM SERPL-MCNC: 8.7 MG/DL (ref 8.5–10.1)
CALCIUM SERPL-MCNC: 8.8 MG/DL (ref 8.5–10.1)
CALCIUM SERPL-MCNC: 8.9 MG/DL (ref 8.5–10.1)
CALCIUM SERPL-MCNC: 9.1 MG/DL (ref 8.5–10.1)
CHLORIDE SERPL-SCNC: 92 MMOL/L (ref 94–109)
CHLORIDE SERPL-SCNC: 93 MMOL/L (ref 94–109)
CHLORIDE SERPL-SCNC: 93 MMOL/L (ref 94–109)
CHLORIDE SERPL-SCNC: 96 MMOL/L (ref 94–109)
CHLORIDE SERPL-SCNC: 97 MMOL/L (ref 94–109)
CHLORIDE SERPL-SCNC: 98 MMOL/L (ref 94–109)
CHLORIDE SERPL-SCNC: 98 MMOL/L (ref 94–109)
CHOLEST SERPL-MCNC: 175 MG/DL
CK SERPL-CCNC: 53 U/L (ref 30–225)
CO2 SERPL-SCNC: 34 MMOL/L (ref 20–32)
CO2 SERPL-SCNC: 35 MMOL/L (ref 20–32)
CO2 SERPL-SCNC: 35 MMOL/L (ref 20–32)
CO2 SERPL-SCNC: 36 MMOL/L (ref 20–32)
CO2 SERPL-SCNC: 37 MMOL/L (ref 20–32)
CO2 SERPL-SCNC: 39 MMOL/L (ref 20–32)
CO2 SERPL-SCNC: 39 MMOL/L (ref 20–32)
CO2 SERPL-SCNC: >45 MMOL/L (ref 20–32)
CO2 SERPL-SCNC: >45 MMOL/L (ref 20–32)
COLOR UR AUTO: YELLOW
COLOR UR AUTO: YELLOW
CREAT SERPL-MCNC: 0.29 MG/DL (ref 0.52–1.04)
CREAT SERPL-MCNC: 0.3 MG/DL (ref 0.52–1.04)
CREAT SERPL-MCNC: 0.35 MG/DL (ref 0.52–1.04)
CREAT SERPL-MCNC: 0.36 MG/DL (ref 0.52–1.04)
CREAT SERPL-MCNC: 0.38 MG/DL (ref 0.52–1.04)
CREAT SERPL-MCNC: 0.4 MG/DL (ref 0.52–1.04)
CREAT SERPL-MCNC: 0.43 MG/DL (ref 0.52–1.04)
CREAT SERPL-MCNC: 0.49 MG/DL (ref 0.52–1.04)
CREAT SERPL-MCNC: 0.52 MG/DL (ref 0.52–1.04)
DIFFERENTIAL METHOD BLD: ABNORMAL
EOSINOPHIL # BLD AUTO: 0 10E9/L (ref 0–0.7)
EOSINOPHIL # BLD AUTO: 0.1 10E9/L (ref 0–0.7)
EOSINOPHIL NFR BLD AUTO: 0 %
EOSINOPHIL NFR BLD AUTO: 0 %
EOSINOPHIL NFR BLD AUTO: 0.1 %
EOSINOPHIL NFR BLD AUTO: 0.1 %
EOSINOPHIL NFR BLD AUTO: 1.4 %
ERYTHROCYTE [DISTWIDTH] IN BLOOD BY AUTOMATED COUNT: 12.9 % (ref 10–15)
ERYTHROCYTE [DISTWIDTH] IN BLOOD BY AUTOMATED COUNT: 13 % (ref 10–15)
ERYTHROCYTE [DISTWIDTH] IN BLOOD BY AUTOMATED COUNT: 13.4 % (ref 10–15)
ERYTHROCYTE [DISTWIDTH] IN BLOOD BY AUTOMATED COUNT: 13.5 % (ref 10–15)
ERYTHROCYTE [DISTWIDTH] IN BLOOD BY AUTOMATED COUNT: 13.6 % (ref 10–15)
ERYTHROCYTE [DISTWIDTH] IN BLOOD BY AUTOMATED COUNT: 13.8 % (ref 10–15)
ERYTHROCYTE [DISTWIDTH] IN BLOOD BY AUTOMATED COUNT: 13.9 % (ref 10–15)
ERYTHROCYTE [DISTWIDTH] IN BLOOD BY AUTOMATED COUNT: 14 % (ref 10–15)
GFR SERPL CREATININE-BSD FRML MDRD: >90 ML/MIN/1.7M2
GLUCOSE BLDC GLUCOMTR-MCNC: 122 MG/DL (ref 70–99)
GLUCOSE BLDC GLUCOMTR-MCNC: 147 MG/DL (ref 70–99)
GLUCOSE BLDC GLUCOMTR-MCNC: 149 MG/DL (ref 70–99)
GLUCOSE BLDC GLUCOMTR-MCNC: 187 MG/DL (ref 70–99)
GLUCOSE SERPL-MCNC: 120 MG/DL (ref 70–99)
GLUCOSE SERPL-MCNC: 121 MG/DL (ref 70–99)
GLUCOSE SERPL-MCNC: 125 MG/DL (ref 70–99)
GLUCOSE SERPL-MCNC: 127 MG/DL (ref 70–99)
GLUCOSE SERPL-MCNC: 128 MG/DL (ref 70–99)
GLUCOSE SERPL-MCNC: 130 MG/DL (ref 70–99)
GLUCOSE SERPL-MCNC: 208 MG/DL (ref 70–99)
GLUCOSE SERPL-MCNC: 246 MG/DL (ref 70–99)
GLUCOSE SERPL-MCNC: 89 MG/DL (ref 70–99)
GLUCOSE UR STRIP-MCNC: 70 MG/DL
GLUCOSE UR STRIP-MCNC: >1000 MG/DL
GRAM STN SPEC: NORMAL
HBA1C MFR BLD: 5.5 % (ref 0–5.6)
HCO3 BLD-SCNC: 44 MMOL/L (ref 21–28)
HCO3 BLDV-SCNC: 41 MMOL/L (ref 21–28)
HCO3 BLDV-SCNC: 42 MMOL/L (ref 21–28)
HCO3 BLDV-SCNC: 53 MMOL/L (ref 21–28)
HCT VFR BLD AUTO: 32.3 % (ref 35–47)
HCT VFR BLD AUTO: 32.3 % (ref 35–47)
HCT VFR BLD AUTO: 32.5 % (ref 35–47)
HCT VFR BLD AUTO: 33 % (ref 35–47)
HCT VFR BLD AUTO: 34.6 % (ref 35–47)
HCT VFR BLD AUTO: 35.8 % (ref 35–47)
HCT VFR BLD AUTO: 36.1 % (ref 35–47)
HCT VFR BLD AUTO: 37.4 % (ref 35–47)
HDLC SERPL-MCNC: 49 MG/DL
HGB BLD-MCNC: 10 G/DL (ref 11.7–15.7)
HGB BLD-MCNC: 10.3 G/DL (ref 11.7–15.7)
HGB BLD-MCNC: 10.4 G/DL (ref 11.7–15.7)
HGB BLD-MCNC: 10.5 G/DL (ref 11.7–15.7)
HGB BLD-MCNC: 10.8 G/DL (ref 11.7–15.7)
HGB BLD-MCNC: 9.5 G/DL (ref 11.7–15.7)
HGB BLD-MCNC: 9.6 G/DL (ref 11.7–15.7)
HGB BLD-MCNC: 9.8 G/DL (ref 11.7–15.7)
HGB UR QL STRIP: ABNORMAL
HGB UR QL STRIP: NEGATIVE
IMM GRANULOCYTES # BLD: 0 10E9/L (ref 0–0.4)
IMM GRANULOCYTES # BLD: 0.1 10E9/L (ref 0–0.4)
IMM GRANULOCYTES # BLD: 0.1 10E9/L (ref 0–0.4)
IMM GRANULOCYTES NFR BLD: 0.2 %
IMM GRANULOCYTES NFR BLD: 0.2 %
IMM GRANULOCYTES NFR BLD: 0.3 %
IMM GRANULOCYTES NFR BLD: 0.3 %
IMM GRANULOCYTES NFR BLD: 0.4 %
INTERPRETATION ECG - MUSE: NORMAL
KETONES UR STRIP-MCNC: NEGATIVE MG/DL
KETONES UR STRIP-MCNC: NEGATIVE MG/DL
LACTATE BLD-SCNC: 1 MMOL/L (ref 0.4–1.9)
LACTATE BLD-SCNC: 1.7 MMOL/L (ref 0.7–2)
LACTATE BLD-SCNC: 1.8 MMOL/L (ref 0.7–2)
LACTATE BLD-SCNC: 1.9 MMOL/L (ref 0.7–2)
LDLC SERPL CALC-MCNC: 107 MG/DL
LEUKOCYTE ESTERASE UR QL STRIP: NEGATIVE
LEUKOCYTE ESTERASE UR QL STRIP: NEGATIVE
LMWH PPP CHRO-ACNC: 0.14 IU/ML
LMWH PPP CHRO-ACNC: 0.18 IU/ML
LMWH PPP CHRO-ACNC: 0.19 IU/ML
LMWH PPP CHRO-ACNC: <0.1 IU/ML
LYMPHOCYTES # BLD AUTO: 0.9 10E9/L (ref 0.8–5.3)
LYMPHOCYTES # BLD AUTO: 1 10E9/L (ref 0.8–5.3)
LYMPHOCYTES # BLD AUTO: 1.2 10E9/L (ref 0.8–5.3)
LYMPHOCYTES # BLD AUTO: 1.9 10E9/L (ref 0.8–5.3)
LYMPHOCYTES # BLD AUTO: 2.1 10E9/L (ref 0.8–5.3)
LYMPHOCYTES NFR BLD AUTO: 10.1 %
LYMPHOCYTES NFR BLD AUTO: 22.6 %
LYMPHOCYTES NFR BLD AUTO: 6.6 %
LYMPHOCYTES NFR BLD AUTO: 7.3 %
LYMPHOCYTES NFR BLD AUTO: 8.4 %
Lab: NORMAL
MAGNESIUM SERPL-MCNC: 2 MG/DL (ref 1.6–2.3)
MCH RBC QN AUTO: 29.9 PG (ref 26.5–33)
MCH RBC QN AUTO: 30.4 PG (ref 26.5–33)
MCH RBC QN AUTO: 30.4 PG (ref 26.5–33)
MCH RBC QN AUTO: 30.8 PG (ref 26.5–33)
MCH RBC QN AUTO: 30.8 PG (ref 26.5–33)
MCH RBC QN AUTO: 30.9 PG (ref 26.5–33)
MCH RBC QN AUTO: 30.9 PG (ref 26.5–33)
MCH RBC QN AUTO: 31.5 PG (ref 26.5–33)
MCHC RBC AUTO-ENTMCNC: 27.7 G/DL (ref 31.5–36.5)
MCHC RBC AUTO-ENTMCNC: 27.8 G/DL (ref 31.5–36.5)
MCHC RBC AUTO-ENTMCNC: 28.8 G/DL (ref 31.5–36.5)
MCHC RBC AUTO-ENTMCNC: 29.1 G/DL (ref 31.5–36.5)
MCHC RBC AUTO-ENTMCNC: 30.2 G/DL (ref 31.5–36.5)
MCHC RBC AUTO-ENTMCNC: 30.3 G/DL (ref 31.5–36.5)
MCHC RBC AUTO-ENTMCNC: 30.8 G/DL (ref 31.5–36.5)
MCHC RBC AUTO-ENTMCNC: 31.9 G/DL (ref 31.5–36.5)
MCV RBC AUTO: 100 FL (ref 78–100)
MCV RBC AUTO: 102 FL (ref 78–100)
MCV RBC AUTO: 103 FL (ref 78–100)
MCV RBC AUTO: 106 FL (ref 78–100)
MCV RBC AUTO: 106 FL (ref 78–100)
MCV RBC AUTO: 108 FL (ref 78–100)
MCV RBC AUTO: 109 FL (ref 78–100)
MCV RBC AUTO: 99 FL (ref 78–100)
MONOCYTES # BLD AUTO: 0.3 10E9/L (ref 0–1.3)
MONOCYTES # BLD AUTO: 0.5 10E9/L (ref 0–1.3)
MONOCYTES # BLD AUTO: 0.7 10E9/L (ref 0–1.3)
MONOCYTES # BLD AUTO: 0.7 10E9/L (ref 0–1.3)
MONOCYTES # BLD AUTO: 1.4 10E9/L (ref 0–1.3)
MONOCYTES NFR BLD AUTO: 2.2 %
MONOCYTES NFR BLD AUTO: 4.2 %
MONOCYTES NFR BLD AUTO: 4.5 %
MONOCYTES NFR BLD AUTO: 7.4 %
MONOCYTES NFR BLD AUTO: 7.7 %
MUCOUS THREADS #/AREA URNS LPF: PRESENT /LPF
MUCOUS THREADS #/AREA URNS LPF: PRESENT /LPF
NEUTROPHILS # BLD AUTO: 10.5 10E9/L (ref 1.6–8.3)
NEUTROPHILS # BLD AUTO: 10.9 10E9/L (ref 1.6–8.3)
NEUTROPHILS # BLD AUTO: 15.1 10E9/L (ref 1.6–8.3)
NEUTROPHILS # BLD AUTO: 15.6 10E9/L (ref 1.6–8.3)
NEUTROPHILS # BLD AUTO: 6.4 10E9/L (ref 1.6–8.3)
NEUTROPHILS NFR BLD AUTO: 67.6 %
NEUTROPHILS NFR BLD AUTO: 81.9 %
NEUTROPHILS NFR BLD AUTO: 86.9 %
NEUTROPHILS NFR BLD AUTO: 88.7 %
NEUTROPHILS NFR BLD AUTO: 90.1 %
NITRATE UR QL: NEGATIVE
NITRATE UR QL: NEGATIVE
NONHDLC SERPL-MCNC: 126 MG/DL
NRBC # BLD AUTO: 0 10*3/UL
NRBC BLD AUTO-RTO: 0 /100
NT-PROBNP SERPL-MCNC: 1484 PG/ML (ref 0–900)
NT-PROBNP SERPL-MCNC: 755 PG/ML (ref 0–900)
OXYHGB MFR BLD: 96 % (ref 92–100)
OXYHGB MFR BLDV: 37 %
OXYHGB MFR BLDV: 76 %
PCO2 BLD: 85 MM HG (ref 35–45)
PCO2 BLDV: 122 MM HG (ref 40–50)
PCO2 BLDV: 70 MM HG (ref 40–50)
PCO2 BLDV: 75 MM HG (ref 40–50)
PH BLD: 7.32 PH (ref 7.35–7.45)
PH BLDV: 7.24 PH (ref 7.32–7.43)
PH BLDV: 7.34 PH (ref 7.32–7.43)
PH BLDV: 7.39 PH (ref 7.32–7.43)
PH UR STRIP: 5.5 PH (ref 5–7)
PH UR STRIP: 6 PH (ref 5–7)
PLATELET # BLD AUTO: 205 10E9/L (ref 150–450)
PLATELET # BLD AUTO: 244 10E9/L (ref 150–450)
PLATELET # BLD AUTO: 272 10E9/L (ref 150–450)
PLATELET # BLD AUTO: 321 10E9/L (ref 150–450)
PLATELET # BLD AUTO: 345 10E9/L (ref 150–450)
PLATELET # BLD AUTO: 347 10E9/L (ref 150–450)
PLATELET # BLD AUTO: 348 10E9/L (ref 150–450)
PLATELET # BLD AUTO: 395 10E9/L (ref 150–450)
PO2 BLD: 90 MM HG (ref 80–105)
PO2 BLDV: 25 MM HG (ref 25–47)
PO2 BLDV: 33 MM HG (ref 25–47)
PO2 BLDV: 43 MM HG (ref 25–47)
POTASSIUM SERPL-SCNC: 3.5 MMOL/L (ref 3.4–5.3)
POTASSIUM SERPL-SCNC: 3.7 MMOL/L (ref 3.4–5.3)
POTASSIUM SERPL-SCNC: 3.9 MMOL/L (ref 3.4–5.3)
POTASSIUM SERPL-SCNC: 4.2 MMOL/L (ref 3.4–5.3)
POTASSIUM SERPL-SCNC: 4.2 MMOL/L (ref 3.4–5.3)
POTASSIUM SERPL-SCNC: 4.4 MMOL/L (ref 3.4–5.3)
POTASSIUM SERPL-SCNC: 4.6 MMOL/L (ref 3.4–5.3)
POTASSIUM SERPL-SCNC: 4.8 MMOL/L (ref 3.4–5.3)
POTASSIUM SERPL-SCNC: 4.9 MMOL/L (ref 3.4–5.3)
PROCALCITONIN SERPL-MCNC: 0.15 NG/ML
PROCALCITONIN SERPL-MCNC: 0.75 NG/ML
PROT SERPL-MCNC: 7.8 G/DL (ref 6.8–8.8)
PROT SERPL-MCNC: 7.8 G/DL (ref 6.8–8.8)
PROT SERPL-MCNC: 8.9 G/DL (ref 6.8–8.8)
RBC # BLD AUTO: 3.12 10E12/L (ref 3.8–5.2)
RBC # BLD AUTO: 3.17 10E12/L (ref 3.8–5.2)
RBC # BLD AUTO: 3.21 10E12/L (ref 3.8–5.2)
RBC # BLD AUTO: 3.25 10E12/L (ref 3.8–5.2)
RBC # BLD AUTO: 3.27 10E12/L (ref 3.8–5.2)
RBC # BLD AUTO: 3.41 10E12/L (ref 3.8–5.2)
RBC # BLD AUTO: 3.42 10E12/L (ref 3.8–5.2)
RBC # BLD AUTO: 3.49 10E12/L (ref 3.8–5.2)
RBC #/AREA URNS AUTO: 1 /HPF (ref 0–2)
RBC #/AREA URNS AUTO: 1 /HPF (ref 0–2)
SODIUM SERPL-SCNC: 133 MMOL/L (ref 133–144)
SODIUM SERPL-SCNC: 136 MMOL/L (ref 133–144)
SODIUM SERPL-SCNC: 137 MMOL/L (ref 133–144)
SODIUM SERPL-SCNC: 137 MMOL/L (ref 133–144)
SODIUM SERPL-SCNC: 138 MMOL/L (ref 133–144)
SODIUM SERPL-SCNC: 139 MMOL/L (ref 133–144)
SODIUM SERPL-SCNC: 141 MMOL/L (ref 133–144)
SODIUM SERPL-SCNC: 141 MMOL/L (ref 133–144)
SODIUM SERPL-SCNC: 142 MMOL/L (ref 133–144)
SOURCE: ABNORMAL
SOURCE: ABNORMAL
SP GR UR STRIP: 1.02 (ref 1–1.03)
SP GR UR STRIP: 1.02 (ref 1–1.03)
SPECIMEN SOURCE: NORMAL
SQUAMOUS #/AREA URNS AUTO: <1 /HPF (ref 0–1)
SQUAMOUS #/AREA URNS AUTO: <1 /HPF (ref 0–1)
TRIGL SERPL-MCNC: 94 MG/DL
TROPONIN I SERPL-MCNC: 0.07 UG/L (ref 0–0.04)
TROPONIN I SERPL-MCNC: 0.09 UG/L (ref 0–0.04)
TROPONIN I SERPL-MCNC: 0.11 UG/L (ref 0–0.04)
TROPONIN I SERPL-MCNC: 0.28 UG/L (ref 0–0.04)
TROPONIN I SERPL-MCNC: 0.28 UG/L (ref 0–0.04)
TROPONIN I SERPL-MCNC: 0.29 UG/L (ref 0–0.04)
TROPONIN I SERPL-MCNC: 0.3 UG/L (ref 0–0.04)
TROPONIN I SERPL-MCNC: 0.33 UG/L (ref 0–0.04)
TROPONIN I SERPL-MCNC: 0.34 UG/L (ref 0–0.04)
TROPONIN I SERPL-MCNC: 0.35 UG/L (ref 0–0.04)
TROPONIN I SERPL-MCNC: 0.35 UG/L (ref 0–0.04)
UROBILINOGEN UR STRIP-MCNC: NORMAL MG/DL (ref 0–2)
UROBILINOGEN UR STRIP-MCNC: NORMAL MG/DL (ref 0–2)
WBC # BLD AUTO: 12 10E9/L (ref 4–11)
WBC # BLD AUTO: 12.1 10E9/L (ref 4–11)
WBC # BLD AUTO: 12.4 10E9/L (ref 4–11)
WBC # BLD AUTO: 17.5 10E9/L (ref 4–11)
WBC # BLD AUTO: 18.5 10E9/L (ref 4–11)
WBC # BLD AUTO: 6.6 10E9/L (ref 4–11)
WBC # BLD AUTO: 7.8 10E9/L (ref 4–11)
WBC # BLD AUTO: 7.9 10E9/L (ref 4–11)
WBC # BLD AUTO: 9.4 10E9/L (ref 4–11)
WBC #/AREA URNS AUTO: 2 /HPF (ref 0–5)
WBC #/AREA URNS AUTO: 2 /HPF (ref 0–5)

## 2018-01-01 PROCEDURE — 94640 AIRWAY INHALATION TREATMENT: CPT | Mod: 76

## 2018-01-01 PROCEDURE — 25000132 ZZH RX MED GY IP 250 OP 250 PS 637: Performed by: INTERNAL MEDICINE

## 2018-01-01 PROCEDURE — G0463 HOSPITAL OUTPT CLINIC VISIT: HCPCS

## 2018-01-01 PROCEDURE — 85025 COMPLETE CBC W/AUTO DIFF WBC: CPT | Performed by: EMERGENCY MEDICINE

## 2018-01-01 PROCEDURE — 94640 AIRWAY INHALATION TREATMENT: CPT

## 2018-01-01 PROCEDURE — 97530 THERAPEUTIC ACTIVITIES: CPT | Mod: GP | Performed by: PHYSICAL THERAPIST

## 2018-01-01 PROCEDURE — 40000275 ZZH STATISTIC RCP TIME EA 10 MIN

## 2018-01-01 PROCEDURE — 36415 COLL VENOUS BLD VENIPUNCTURE: CPT | Performed by: INTERNAL MEDICINE

## 2018-01-01 PROCEDURE — 40000917 ZZH STATISTIC PEAK FLOW MEASUREMENT

## 2018-01-01 PROCEDURE — 99232 SBSQ HOSP IP/OBS MODERATE 35: CPT | Performed by: INTERNAL MEDICINE

## 2018-01-01 PROCEDURE — 71046 X-RAY EXAM CHEST 2 VIEWS: CPT

## 2018-01-01 PROCEDURE — 93005 ELECTROCARDIOGRAM TRACING: CPT

## 2018-01-01 PROCEDURE — 40000193 ZZH STATISTIC PT WARD VISIT

## 2018-01-01 PROCEDURE — 71260 CT THORAX DX C+: CPT

## 2018-01-01 PROCEDURE — 87040 BLOOD CULTURE FOR BACTERIA: CPT | Performed by: EMERGENCY MEDICINE

## 2018-01-01 PROCEDURE — 83036 HEMOGLOBIN GLYCOSYLATED A1C: CPT | Performed by: EMERGENCY MEDICINE

## 2018-01-01 PROCEDURE — 99233 SBSQ HOSP IP/OBS HIGH 50: CPT | Performed by: HOSPITALIST

## 2018-01-01 PROCEDURE — 25000125 ZZHC RX 250: Performed by: INTERNAL MEDICINE

## 2018-01-01 PROCEDURE — 25000128 H RX IP 250 OP 636: Performed by: INTERNAL MEDICINE

## 2018-01-01 PROCEDURE — 82805 BLOOD GASES W/O2 SATURATION: CPT | Performed by: HOSPITALIST

## 2018-01-01 PROCEDURE — 25000128 H RX IP 250 OP 636: Performed by: EMERGENCY MEDICINE

## 2018-01-01 PROCEDURE — 36415 COLL VENOUS BLD VENIPUNCTURE: CPT | Performed by: HOSPITALIST

## 2018-01-01 PROCEDURE — 99239 HOSP IP/OBS DSCHRG MGMT >30: CPT | Performed by: INTERNAL MEDICINE

## 2018-01-01 PROCEDURE — 93325 DOPPLER ECHO COLOR FLOW MAPG: CPT | Mod: 26 | Performed by: INTERNAL MEDICINE

## 2018-01-01 PROCEDURE — 25000125 ZZHC RX 250: Performed by: EMERGENCY MEDICINE

## 2018-01-01 PROCEDURE — 84484 ASSAY OF TROPONIN QUANT: CPT | Mod: 91 | Performed by: INTERNAL MEDICINE

## 2018-01-01 PROCEDURE — 97110 THERAPEUTIC EXERCISES: CPT | Mod: GP

## 2018-01-01 PROCEDURE — 12000007 ZZH R&B INTERMEDIATE

## 2018-01-01 PROCEDURE — 99222 1ST HOSP IP/OBS MODERATE 55: CPT | Performed by: INTERNAL MEDICINE

## 2018-01-01 PROCEDURE — 25000125 ZZHC RX 250

## 2018-01-01 PROCEDURE — 00000146 ZZHCL STATISTIC GLUCOSE BY METER IP

## 2018-01-01 PROCEDURE — 83880 ASSAY OF NATRIURETIC PEPTIDE: CPT | Performed by: EMERGENCY MEDICINE

## 2018-01-01 PROCEDURE — 84484 ASSAY OF TROPONIN QUANT: CPT | Performed by: INTERNAL MEDICINE

## 2018-01-01 PROCEDURE — 99223 1ST HOSP IP/OBS HIGH 75: CPT | Mod: 25 | Performed by: INTERNAL MEDICINE

## 2018-01-01 PROCEDURE — 99292 CRITICAL CARE ADDL 30 MIN: CPT

## 2018-01-01 PROCEDURE — 85027 COMPLETE CBC AUTOMATED: CPT | Performed by: INTERNAL MEDICINE

## 2018-01-01 PROCEDURE — 94799 UNLISTED PULMONARY SVC/PX: CPT

## 2018-01-01 PROCEDURE — 25000128 H RX IP 250 OP 636: Performed by: HOSPITALIST

## 2018-01-01 PROCEDURE — 40000855 ZZH STATISTIC ECHO STRESS OR NM NPI

## 2018-01-01 PROCEDURE — 25000128 H RX IP 250 OP 636

## 2018-01-01 PROCEDURE — 93308 TTE F-UP OR LMTD: CPT | Mod: 26 | Performed by: INTERNAL MEDICINE

## 2018-01-01 PROCEDURE — 99223 1ST HOSP IP/OBS HIGH 75: CPT | Mod: AI | Performed by: INTERNAL MEDICINE

## 2018-01-01 PROCEDURE — 87086 URINE CULTURE/COLONY COUNT: CPT | Performed by: EMERGENCY MEDICINE

## 2018-01-01 PROCEDURE — 80048 BASIC METABOLIC PNL TOTAL CA: CPT | Performed by: INTERNAL MEDICINE

## 2018-01-01 PROCEDURE — 99207 ZZC OFFICE-HOSPITAL ADMIT: CPT | Performed by: FAMILY MEDICINE

## 2018-01-01 PROCEDURE — G0378 HOSPITAL OBSERVATION PER HR: HCPCS

## 2018-01-01 PROCEDURE — 12000000 ZZH R&B MED SURG/OB

## 2018-01-01 PROCEDURE — 21400002 ZZH R&B CCU CICU CRITICAL

## 2018-01-01 PROCEDURE — 40000886 ZZH STATISTIC STEP DOWN HRS DAY

## 2018-01-01 PROCEDURE — 82550 ASSAY OF CK (CPK): CPT | Performed by: HOSPITALIST

## 2018-01-01 PROCEDURE — 97116 GAIT TRAINING THERAPY: CPT | Mod: GP

## 2018-01-01 PROCEDURE — 40000141 ZZH STATISTIC PERIPHERAL IV START W/O US GUIDANCE

## 2018-01-01 PROCEDURE — 96375 TX/PRO/DX INJ NEW DRUG ADDON: CPT

## 2018-01-01 PROCEDURE — 83605 ASSAY OF LACTIC ACID: CPT | Performed by: EMERGENCY MEDICINE

## 2018-01-01 PROCEDURE — 85520 HEPARIN ASSAY: CPT | Performed by: INTERNAL MEDICINE

## 2018-01-01 PROCEDURE — 96376 TX/PRO/DX INJ SAME DRUG ADON: CPT

## 2018-01-01 PROCEDURE — 87070 CULTURE OTHR SPECIMN AEROBIC: CPT | Performed by: INTERNAL MEDICINE

## 2018-01-01 PROCEDURE — 99232 SBSQ HOSP IP/OBS MODERATE 35: CPT | Performed by: HOSPITALIST

## 2018-01-01 PROCEDURE — 36415 COLL VENOUS BLD VENIPUNCTURE: CPT

## 2018-01-01 PROCEDURE — 99214 OFFICE O/P EST MOD 30 MIN: CPT | Performed by: FAMILY MEDICINE

## 2018-01-01 PROCEDURE — 80061 LIPID PANEL: CPT | Performed by: INTERNAL MEDICINE

## 2018-01-01 PROCEDURE — 96365 THER/PROPH/DIAG IV INF INIT: CPT | Mod: 59

## 2018-01-01 PROCEDURE — 93306 TTE W/DOPPLER COMPLETE: CPT | Mod: 26 | Performed by: INTERNAL MEDICINE

## 2018-01-01 PROCEDURE — 83605 ASSAY OF LACTIC ACID: CPT | Performed by: INTERNAL MEDICINE

## 2018-01-01 PROCEDURE — 25000125 ZZHC RX 250: Performed by: HOSPITALIST

## 2018-01-01 PROCEDURE — 40000809 ZZH STATISTIC NO DOCUMENTATION TO SUPPORT CHARGE

## 2018-01-01 PROCEDURE — 84484 ASSAY OF TROPONIN QUANT: CPT | Performed by: EMERGENCY MEDICINE

## 2018-01-01 PROCEDURE — 99285 EMERGENCY DEPT VISIT HI MDM: CPT | Mod: 25

## 2018-01-01 PROCEDURE — 99233 SBSQ HOSP IP/OBS HIGH 50: CPT | Performed by: INTERNAL MEDICINE

## 2018-01-01 PROCEDURE — 96365 THER/PROPH/DIAG IV INF INIT: CPT

## 2018-01-01 PROCEDURE — 81001 URINALYSIS AUTO W/SCOPE: CPT | Performed by: EMERGENCY MEDICINE

## 2018-01-01 PROCEDURE — 80048 BASIC METABOLIC PNL TOTAL CA: CPT | Performed by: EMERGENCY MEDICINE

## 2018-01-01 PROCEDURE — 40000884 ZZH STATISTIC STEP DOWN HRS NIGHT

## 2018-01-01 PROCEDURE — 99217 ZZC OBSERVATION CARE DISCHARGE: CPT | Performed by: PHYSICIAN ASSISTANT

## 2018-01-01 PROCEDURE — 36415 COLL VENOUS BLD VENIPUNCTURE: CPT | Performed by: EMERGENCY MEDICINE

## 2018-01-01 PROCEDURE — 93306 TTE W/DOPPLER COMPLETE: CPT

## 2018-01-01 PROCEDURE — 85025 COMPLETE CBC W/AUTO DIFF WBC: CPT | Performed by: INTERNAL MEDICINE

## 2018-01-01 PROCEDURE — 99207 ZZC CDG-CODE CATEGORY CHANGED: CPT | Performed by: PHYSICIAN ASSISTANT

## 2018-01-01 PROCEDURE — 84145 PROCALCITONIN (PCT): CPT | Performed by: INTERNAL MEDICINE

## 2018-01-01 PROCEDURE — 85048 AUTOMATED LEUKOCYTE COUNT: CPT | Performed by: HOSPITALIST

## 2018-01-01 PROCEDURE — 21000000 ZZH R&B IMCU HEART CARE

## 2018-01-01 PROCEDURE — 80053 COMPREHEN METABOLIC PANEL: CPT | Performed by: HOSPITALIST

## 2018-01-01 PROCEDURE — 96374 THER/PROPH/DIAG INJ IV PUSH: CPT

## 2018-01-01 PROCEDURE — 93010 ELECTROCARDIOGRAM REPORT: CPT | Performed by: INTERNAL MEDICINE

## 2018-01-01 PROCEDURE — 99291 CRITICAL CARE FIRST HOUR: CPT | Mod: 25

## 2018-01-01 PROCEDURE — 21000001 ZZH R&B HEART CARE

## 2018-01-01 PROCEDURE — 94664 DEMO&/EVAL PT USE INHALER: CPT

## 2018-01-01 PROCEDURE — 99356 ZZC PROLONGED SERV,INPATIENT,1ST HR: CPT | Performed by: INTERNAL MEDICINE

## 2018-01-01 PROCEDURE — 94660 CPAP INITIATION&MGMT: CPT

## 2018-01-01 PROCEDURE — 25500064 ZZH RX 255 OP 636: Performed by: INTERNAL MEDICINE

## 2018-01-01 PROCEDURE — 96361 HYDRATE IV INFUSION ADD-ON: CPT

## 2018-01-01 PROCEDURE — 93321 DOPPLER ECHO F-UP/LMTD STD: CPT | Mod: 26 | Performed by: INTERNAL MEDICINE

## 2018-01-01 PROCEDURE — 93325 DOPPLER ECHO COLOR FLOW MAPG: CPT

## 2018-01-01 PROCEDURE — 40000885 ZZH STATISTIC STEP DOWN HRS EVENING

## 2018-01-01 PROCEDURE — 99222 1ST HOSP IP/OBS MODERATE 55: CPT | Mod: 25 | Performed by: INTERNAL MEDICINE

## 2018-01-01 PROCEDURE — 36600 WITHDRAWAL OF ARTERIAL BLOOD: CPT

## 2018-01-01 PROCEDURE — 97161 PT EVAL LOW COMPLEX 20 MIN: CPT | Mod: GP

## 2018-01-01 PROCEDURE — 40000193 ZZH STATISTIC PT WARD VISIT: Performed by: PHYSICAL THERAPIST

## 2018-01-01 PROCEDURE — 80053 COMPREHEN METABOLIC PANEL: CPT | Performed by: EMERGENCY MEDICINE

## 2018-01-01 PROCEDURE — 99207 ZZC CDG-CODE INCORRECT PER BILLING BASED ON TIME: CPT | Performed by: INTERNAL MEDICINE

## 2018-01-01 PROCEDURE — 40000894 ZZH STATISTIC OT IP EVAL DEFER

## 2018-01-01 PROCEDURE — 99238 HOSP IP/OBS DSCHRG MGMT 30/<: CPT | Performed by: INTERNAL MEDICINE

## 2018-01-01 PROCEDURE — 99207 C PAF COMPLETED  NO CHARGE: CPT | Performed by: FAMILY MEDICINE

## 2018-01-01 PROCEDURE — 40000274 ZZH STATISTIC RCP CONSULT EA 30 MIN

## 2018-01-01 PROCEDURE — 40000276 ZZH STATISTIC RCP TIME ED VENT EA 10 MIN

## 2018-01-01 PROCEDURE — 81001 URINALYSIS AUTO W/SCOPE: CPT | Performed by: HOSPITALIST

## 2018-01-01 PROCEDURE — 82803 BLOOD GASES ANY COMBINATION: CPT | Performed by: EMERGENCY MEDICINE

## 2018-01-01 PROCEDURE — 25000132 ZZH RX MED GY IP 250 OP 250 PS 637: Performed by: EMERGENCY MEDICINE

## 2018-01-01 PROCEDURE — 25000131 ZZH RX MED GY IP 250 OP 636 PS 637: Performed by: INTERNAL MEDICINE

## 2018-01-01 PROCEDURE — 25000132 ZZH RX MED GY IP 250 OP 250 PS 637: Performed by: NURSE PRACTITIONER

## 2018-01-01 PROCEDURE — 87205 SMEAR GRAM STAIN: CPT | Performed by: INTERNAL MEDICINE

## 2018-01-01 PROCEDURE — 85520 HEPARIN ASSAY: CPT | Performed by: HOSPITALIST

## 2018-01-01 PROCEDURE — 93350 STRESS TTE ONLY: CPT | Mod: TC

## 2018-01-01 PROCEDURE — 99223 1ST HOSP IP/OBS HIGH 75: CPT | Performed by: NURSE PRACTITIONER

## 2018-01-01 PROCEDURE — 82805 BLOOD GASES W/O2 SATURATION: CPT | Performed by: EMERGENCY MEDICINE

## 2018-01-01 PROCEDURE — 25000125 ZZHC RX 250: Performed by: NURSE PRACTITIONER

## 2018-01-01 PROCEDURE — 97161 PT EVAL LOW COMPLEX 20 MIN: CPT | Mod: GP | Performed by: PHYSICAL THERAPIST

## 2018-01-01 PROCEDURE — 83735 ASSAY OF MAGNESIUM: CPT | Performed by: EMERGENCY MEDICINE

## 2018-01-01 RX ORDER — NITROGLYCERIN 0.4 MG/1
0.4 TABLET SUBLINGUAL EVERY 5 MIN PRN
Status: DISCONTINUED | OUTPATIENT
Start: 2018-01-01 | End: 2018-01-01

## 2018-01-01 RX ORDER — METHYLPREDNISOLONE SODIUM SUCCINATE 125 MG/2ML
60 INJECTION, POWDER, LYOPHILIZED, FOR SOLUTION INTRAMUSCULAR; INTRAVENOUS EVERY 24 HOURS
Status: DISCONTINUED | OUTPATIENT
Start: 2018-01-01 | End: 2018-01-01

## 2018-01-01 RX ORDER — ALBUTEROL SULFATE 0.83 MG/ML
3 SOLUTION RESPIRATORY (INHALATION)
Status: DISCONTINUED | OUTPATIENT
Start: 2018-01-01 | End: 2018-01-01 | Stop reason: HOSPADM

## 2018-01-01 RX ORDER — PREDNISONE 10 MG/1
30 TABLET ORAL DAILY
Qty: 9 TABLET | Refills: 0 | Status: SHIPPED | OUTPATIENT
Start: 2018-01-01 | End: 2018-01-01

## 2018-01-01 RX ORDER — NICOTINE POLACRILEX 4 MG
15-30 LOZENGE BUCCAL
Status: DISCONTINUED | OUTPATIENT
Start: 2018-01-01 | End: 2018-01-01

## 2018-01-01 RX ORDER — HYDROXYZINE HYDROCHLORIDE 10 MG/1
10-20 TABLET, FILM COATED ORAL 3 TIMES DAILY PRN
Qty: 60 TABLET | Refills: 0 | Status: SHIPPED | OUTPATIENT
Start: 2018-01-01 | End: 2018-01-01

## 2018-01-01 RX ORDER — LABETALOL HYDROCHLORIDE 5 MG/ML
10 INJECTION, SOLUTION INTRAVENOUS
Status: DISCONTINUED | OUTPATIENT
Start: 2018-01-01 | End: 2018-01-01 | Stop reason: HOSPADM

## 2018-01-01 RX ORDER — PREDNISONE 10 MG/1
10 TABLET ORAL DAILY
Qty: 30 TABLET | Refills: 0 | Status: SHIPPED | OUTPATIENT
Start: 2018-01-01

## 2018-01-01 RX ORDER — ASPIRIN 325 MG
TABLET ORAL
Status: DISCONTINUED
Start: 2018-01-01 | End: 2018-01-01 | Stop reason: HOSPADM

## 2018-01-01 RX ORDER — ARFORMOTEROL TARTRATE 15 UG/2ML
15 SOLUTION RESPIRATORY (INHALATION) 2 TIMES DAILY
Status: DISCONTINUED | OUTPATIENT
Start: 2018-01-01 | End: 2018-01-01 | Stop reason: HOSPADM

## 2018-01-01 RX ORDER — CEFTRIAXONE 2 G/1
2 INJECTION, POWDER, FOR SOLUTION INTRAMUSCULAR; INTRAVENOUS EVERY 24 HOURS
Status: DISCONTINUED | OUTPATIENT
Start: 2018-01-01 | End: 2018-01-01

## 2018-01-01 RX ORDER — ONDANSETRON 4 MG/1
4 TABLET, ORALLY DISINTEGRATING ORAL EVERY 6 HOURS PRN
Status: DISCONTINUED | OUTPATIENT
Start: 2018-01-01 | End: 2018-01-01 | Stop reason: HOSPADM

## 2018-01-01 RX ORDER — IOPAMIDOL 755 MG/ML
49 INJECTION, SOLUTION INTRAVASCULAR ONCE
Status: COMPLETED | OUTPATIENT
Start: 2018-01-01 | End: 2018-01-01

## 2018-01-01 RX ORDER — IPRATROPIUM BROMIDE AND ALBUTEROL SULFATE 2.5; .5 MG/3ML; MG/3ML
3 SOLUTION RESPIRATORY (INHALATION) EVERY 6 HOURS
Qty: 360 ML | Refills: 0 | Status: SHIPPED | OUTPATIENT
Start: 2018-01-01

## 2018-01-01 RX ORDER — IPRATROPIUM BROMIDE AND ALBUTEROL SULFATE 2.5; .5 MG/3ML; MG/3ML
3 SOLUTION RESPIRATORY (INHALATION) EVERY 4 HOURS
Status: DISCONTINUED | OUTPATIENT
Start: 2018-01-01 | End: 2018-01-01

## 2018-01-01 RX ORDER — CEFUROXIME AXETIL 500 MG/1
500 TABLET ORAL EVERY 12 HOURS
Qty: 12 TABLET | Refills: 0 | Status: SHIPPED | OUTPATIENT
Start: 2018-01-01 | End: 2018-01-01

## 2018-01-01 RX ORDER — IPRATROPIUM BROMIDE AND ALBUTEROL SULFATE 2.5; .5 MG/3ML; MG/3ML
3 SOLUTION RESPIRATORY (INHALATION) ONCE
Status: COMPLETED | OUTPATIENT
Start: 2018-01-01 | End: 2018-01-01

## 2018-01-01 RX ORDER — LORAZEPAM 2 MG/ML
.5-1 INJECTION INTRAMUSCULAR EVERY 4 HOURS PRN
Status: DISCONTINUED | OUTPATIENT
Start: 2018-01-01 | End: 2018-01-01 | Stop reason: HOSPADM

## 2018-01-01 RX ORDER — DIPHENHYDRAMINE HYDROCHLORIDE AND LIDOCAINE HYDROCHLORIDE AND ALUMINUM HYDROXIDE AND MAGNESIUM HYDRO
10 KIT EVERY 6 HOURS PRN
Status: DISCONTINUED | OUTPATIENT
Start: 2018-01-01 | End: 2018-01-01 | Stop reason: HOSPADM

## 2018-01-01 RX ORDER — CEFTRIAXONE 1 G/1
1 INJECTION, POWDER, FOR SOLUTION INTRAMUSCULAR; INTRAVENOUS ONCE
Status: COMPLETED | OUTPATIENT
Start: 2018-01-01 | End: 2018-01-01

## 2018-01-01 RX ORDER — HYDROCODONE BITARTRATE AND ACETAMINOPHEN 5; 325 MG/1; MG/1
1-2 TABLET ORAL EVERY 4 HOURS PRN
Status: DISCONTINUED | OUTPATIENT
Start: 2018-01-01 | End: 2018-01-01 | Stop reason: HOSPADM

## 2018-01-01 RX ORDER — ACETAMINOPHEN 500 MG
1000 TABLET ORAL 2 TIMES DAILY PRN
Status: DISCONTINUED | OUTPATIENT
Start: 2018-01-01 | End: 2018-01-01 | Stop reason: HOSPADM

## 2018-01-01 RX ORDER — ACETAMINOPHEN 325 MG/1
650 TABLET ORAL EVERY 4 HOURS PRN
Status: DISCONTINUED | OUTPATIENT
Start: 2018-01-01 | End: 2018-01-01 | Stop reason: HOSPADM

## 2018-01-01 RX ORDER — GUAIFENESIN 600 MG/1
1200 TABLET, EXTENDED RELEASE ORAL 2 TIMES DAILY
Status: DISCONTINUED | OUTPATIENT
Start: 2018-01-01 | End: 2018-01-01 | Stop reason: HOSPADM

## 2018-01-01 RX ORDER — CEFTRIAXONE 1 G/1
1 INJECTION, POWDER, FOR SOLUTION INTRAMUSCULAR; INTRAVENOUS EVERY 24 HOURS
Status: DISCONTINUED | OUTPATIENT
Start: 2018-01-01 | End: 2018-01-01 | Stop reason: HOSPADM

## 2018-01-01 RX ORDER — ATROPINE SULFATE 10 MG/ML
1-2 SOLUTION/ DROPS OPHTHALMIC
Status: DISCONTINUED | OUTPATIENT
Start: 2018-01-01 | End: 2018-01-01 | Stop reason: HOSPADM

## 2018-01-01 RX ORDER — PROCHLORPERAZINE 25 MG
12.5 SUPPOSITORY, RECTAL RECTAL EVERY 12 HOURS PRN
Status: DISCONTINUED | OUTPATIENT
Start: 2018-01-01 | End: 2018-01-01 | Stop reason: HOSPADM

## 2018-01-01 RX ORDER — AMOXICILLIN 250 MG
1 CAPSULE ORAL AT BEDTIME
Status: DISCONTINUED | OUTPATIENT
Start: 2018-01-01 | End: 2018-01-01 | Stop reason: HOSPADM

## 2018-01-01 RX ORDER — ALBUTEROL SULFATE 5 MG/ML
2.5 SOLUTION RESPIRATORY (INHALATION) ONCE
Status: COMPLETED | OUTPATIENT
Start: 2018-01-01 | End: 2018-01-01

## 2018-01-01 RX ORDER — ASPIRIN 325 MG
325 TABLET ORAL ONCE
Status: COMPLETED | OUTPATIENT
Start: 2018-01-01 | End: 2018-01-01

## 2018-01-01 RX ORDER — HYDRALAZINE HYDROCHLORIDE 20 MG/ML
INJECTION INTRAMUSCULAR; INTRAVENOUS
Status: COMPLETED
Start: 2018-01-01 | End: 2018-01-01

## 2018-01-01 RX ORDER — HYDRALAZINE HYDROCHLORIDE 20 MG/ML
10 INJECTION INTRAMUSCULAR; INTRAVENOUS EVERY 4 HOURS PRN
Status: DISCONTINUED | OUTPATIENT
Start: 2018-01-01 | End: 2018-01-01

## 2018-01-01 RX ORDER — AMOXICILLIN 250 MG
2 CAPSULE ORAL 2 TIMES DAILY
Status: DISCONTINUED | OUTPATIENT
Start: 2018-01-01 | End: 2018-01-01 | Stop reason: HOSPADM

## 2018-01-01 RX ORDER — HYDRALAZINE HYDROCHLORIDE 20 MG/ML
10 INJECTION INTRAMUSCULAR; INTRAVENOUS EVERY 6 HOURS PRN
Status: DISCONTINUED | OUTPATIENT
Start: 2018-01-01 | End: 2018-01-01 | Stop reason: HOSPADM

## 2018-01-01 RX ORDER — NALOXONE HYDROCHLORIDE 0.4 MG/ML
.1-.4 INJECTION, SOLUTION INTRAMUSCULAR; INTRAVENOUS; SUBCUTANEOUS
Status: DISCONTINUED | OUTPATIENT
Start: 2018-01-01 | End: 2018-01-01

## 2018-01-01 RX ORDER — AMOXICILLIN 250 MG
2 CAPSULE ORAL 2 TIMES DAILY PRN
Status: DISCONTINUED | OUTPATIENT
Start: 2018-01-01 | End: 2018-01-01 | Stop reason: HOSPADM

## 2018-01-01 RX ORDER — ACETAMINOPHEN 325 MG/1
650 TABLET ORAL EVERY 4 HOURS PRN
Status: DISCONTINUED | OUTPATIENT
Start: 2018-01-01 | End: 2018-01-01

## 2018-01-01 RX ORDER — ASPIRIN 81 MG/1
81 TABLET, CHEWABLE ORAL DAILY
Status: DISCONTINUED | OUTPATIENT
Start: 2018-01-01 | End: 2018-01-01 | Stop reason: HOSPADM

## 2018-01-01 RX ORDER — ACETAMINOPHEN 325 MG/1
325-650 TABLET ORAL EVERY 4 HOURS PRN
Status: DISCONTINUED | OUTPATIENT
Start: 2018-01-01 | End: 2018-01-01 | Stop reason: HOSPADM

## 2018-01-01 RX ORDER — ALBUTEROL SULFATE 90 UG/1
2 AEROSOL, METERED RESPIRATORY (INHALATION) EVERY 6 HOURS PRN
Status: DISCONTINUED | OUTPATIENT
Start: 2018-01-01 | End: 2018-01-01 | Stop reason: HOSPADM

## 2018-01-01 RX ORDER — ALBUTEROL SULFATE 90 UG/1
2 AEROSOL, METERED RESPIRATORY (INHALATION) EVERY 6 HOURS PRN
Refills: 0 | COMMUNITY
Start: 2018-01-01

## 2018-01-01 RX ORDER — ASPIRIN 81 MG/1
162 TABLET, CHEWABLE ORAL ONCE
Status: DISCONTINUED | OUTPATIENT
Start: 2018-01-01 | End: 2018-01-01

## 2018-01-01 RX ORDER — DOBUTAMINE HYDROCHLORIDE 200 MG/100ML
10-50 INJECTION INTRAVENOUS CONTINUOUS
Status: ACTIVE | OUTPATIENT
Start: 2018-01-01 | End: 2018-01-01

## 2018-01-01 RX ORDER — POTASSIUM CHLORIDE 29.8 MG/ML
20 INJECTION INTRAVENOUS
Status: DISCONTINUED | OUTPATIENT
Start: 2018-01-01 | End: 2018-01-01

## 2018-01-01 RX ORDER — PREDNISONE 20 MG/1
40 TABLET ORAL DAILY
Status: DISCONTINUED | OUTPATIENT
Start: 2018-01-01 | End: 2018-01-01

## 2018-01-01 RX ORDER — PREDNISONE 20 MG/1
60 TABLET ORAL ONCE
Status: COMPLETED | OUTPATIENT
Start: 2018-01-01 | End: 2018-01-01

## 2018-01-01 RX ORDER — AZITHROMYCIN 250 MG/1
250 TABLET, FILM COATED ORAL DAILY
Qty: 2 TABLET | Refills: 0 | Status: SHIPPED | OUTPATIENT
Start: 2018-01-01 | End: 2018-01-01

## 2018-01-01 RX ORDER — IOPAMIDOL 755 MG/ML
52 INJECTION, SOLUTION INTRAVASCULAR ONCE
Status: COMPLETED | OUTPATIENT
Start: 2018-01-01 | End: 2018-01-01

## 2018-01-01 RX ORDER — METHYLPREDNISOLONE SODIUM SUCCINATE 125 MG/2ML
60 INJECTION, POWDER, LYOPHILIZED, FOR SOLUTION INTRAMUSCULAR; INTRAVENOUS EVERY 6 HOURS
Status: DISCONTINUED | OUTPATIENT
Start: 2018-01-01 | End: 2018-01-01

## 2018-01-01 RX ORDER — HALOPERIDOL 5 MG/ML
1-2 INJECTION INTRAMUSCULAR EVERY 6 HOURS PRN
Status: DISCONTINUED | OUTPATIENT
Start: 2018-01-01 | End: 2018-01-01 | Stop reason: HOSPADM

## 2018-01-01 RX ORDER — METHYLPREDNISOLONE SODIUM SUCCINATE 125 MG/2ML
60 INJECTION, POWDER, LYOPHILIZED, FOR SOLUTION INTRAMUSCULAR; INTRAVENOUS EVERY 12 HOURS
Status: DISCONTINUED | OUTPATIENT
Start: 2018-01-01 | End: 2018-01-01

## 2018-01-01 RX ORDER — BUDESONIDE 0.5 MG/2ML
0.5 INHALANT ORAL 2 TIMES DAILY
Status: DISCONTINUED | OUTPATIENT
Start: 2018-01-01 | End: 2018-01-01 | Stop reason: HOSPADM

## 2018-01-01 RX ORDER — PREDNISONE 10 MG/1
TABLET ORAL
Qty: 60 TABLET | Refills: 0 | Status: SHIPPED | OUTPATIENT
Start: 2018-01-01 | End: 2018-01-01

## 2018-01-01 RX ORDER — HALOPERIDOL 2 MG/ML
.5-1 SOLUTION ORAL EVERY 6 HOURS PRN
Status: DISCONTINUED | OUTPATIENT
Start: 2018-01-01 | End: 2018-01-01 | Stop reason: HOSPADM

## 2018-01-01 RX ORDER — ASPIRIN 81 MG/1
81 TABLET ORAL DAILY
Status: DISCONTINUED | OUTPATIENT
Start: 2018-01-01 | End: 2018-01-01 | Stop reason: HOSPADM

## 2018-01-01 RX ORDER — GUAIFENESIN/DEXTROMETHORPHAN 100-10MG/5
5 SYRUP ORAL EVERY 4 HOURS PRN
Status: DISCONTINUED | OUTPATIENT
Start: 2018-01-01 | End: 2018-01-01 | Stop reason: HOSPADM

## 2018-01-01 RX ORDER — BISACODYL 10 MG
10 SUPPOSITORY, RECTAL RECTAL DAILY PRN
Status: DISCONTINUED | OUTPATIENT
Start: 2018-01-01 | End: 2018-01-01 | Stop reason: HOSPADM

## 2018-01-01 RX ORDER — NITROGLYCERIN 0.4 MG/1
0.4 TABLET SUBLINGUAL EVERY 5 MIN PRN
Status: DISCONTINUED | OUTPATIENT
Start: 2018-01-01 | End: 2018-01-01 | Stop reason: HOSPADM

## 2018-01-01 RX ORDER — MORPHINE SULFATE 100 MG/5ML
5-10 SOLUTION ORAL
Status: DISCONTINUED | OUTPATIENT
Start: 2018-01-01 | End: 2018-01-01 | Stop reason: HOSPADM

## 2018-01-01 RX ORDER — IPRATROPIUM BROMIDE AND ALBUTEROL SULFATE 2.5; .5 MG/3ML; MG/3ML
3 SOLUTION RESPIRATORY (INHALATION) EVERY 6 HOURS
Status: DISCONTINUED | OUTPATIENT
Start: 2018-01-01 | End: 2018-01-01 | Stop reason: HOSPADM

## 2018-01-01 RX ORDER — ONDANSETRON 2 MG/ML
4 INJECTION INTRAMUSCULAR; INTRAVENOUS EVERY 6 HOURS PRN
Status: DISCONTINUED | OUTPATIENT
Start: 2018-01-01 | End: 2018-01-01 | Stop reason: HOSPADM

## 2018-01-01 RX ORDER — MORPHINE SULFATE 2 MG/ML
2-4 INJECTION, SOLUTION INTRAMUSCULAR; INTRAVENOUS EVERY 10 MIN PRN
Status: DISCONTINUED | OUTPATIENT
Start: 2018-01-01 | End: 2018-01-01 | Stop reason: HOSPADM

## 2018-01-01 RX ORDER — ASPIRIN 300 MG/1
300 SUPPOSITORY RECTAL ONCE
Status: DISCONTINUED | OUTPATIENT
Start: 2018-01-01 | End: 2018-01-01

## 2018-01-01 RX ORDER — METHYLPREDNISOLONE SODIUM SUCCINATE 125 MG/2ML
125 INJECTION, POWDER, LYOPHILIZED, FOR SOLUTION INTRAMUSCULAR; INTRAVENOUS ONCE
Status: COMPLETED | OUTPATIENT
Start: 2018-01-01 | End: 2018-01-01

## 2018-01-01 RX ORDER — AMOXICILLIN 250 MG
1 CAPSULE ORAL 2 TIMES DAILY
Status: DISCONTINUED | OUTPATIENT
Start: 2018-01-01 | End: 2018-01-01 | Stop reason: HOSPADM

## 2018-01-01 RX ORDER — METOPROLOL TARTRATE 1 MG/ML
1-30 INJECTION, SOLUTION INTRAVENOUS
Status: ACTIVE | OUTPATIENT
Start: 2018-01-01 | End: 2018-01-01

## 2018-01-01 RX ORDER — HYDROXYZINE HYDROCHLORIDE 10 MG/1
10-20 TABLET, FILM COATED ORAL 3 TIMES DAILY PRN
Status: DISCONTINUED | OUTPATIENT
Start: 2018-01-01 | End: 2018-01-01 | Stop reason: HOSPADM

## 2018-01-01 RX ORDER — ALBUTEROL SULFATE 5 MG/ML
2.5 SOLUTION RESPIRATORY (INHALATION)
Status: DISCONTINUED | OUTPATIENT
Start: 2018-01-01 | End: 2018-01-01 | Stop reason: HOSPADM

## 2018-01-01 RX ORDER — LIDOCAINE 40 MG/G
CREAM TOPICAL
Status: DISCONTINUED | OUTPATIENT
Start: 2018-01-01 | End: 2018-01-01

## 2018-01-01 RX ORDER — PROCHLORPERAZINE MALEATE 5 MG
5 TABLET ORAL EVERY 6 HOURS PRN
Status: DISCONTINUED | OUTPATIENT
Start: 2018-01-01 | End: 2018-01-01 | Stop reason: HOSPADM

## 2018-01-01 RX ORDER — HYDRALAZINE HYDROCHLORIDE 20 MG/ML
10 INJECTION INTRAMUSCULAR; INTRAVENOUS EVERY 4 HOURS PRN
Status: DISCONTINUED | OUTPATIENT
Start: 2018-01-01 | End: 2018-01-01 | Stop reason: HOSPADM

## 2018-01-01 RX ORDER — PREDNISONE 20 MG/1
20 TABLET ORAL DAILY
Qty: 4 TABLET | Refills: 0 | Status: SHIPPED | OUTPATIENT
Start: 2018-01-01 | End: 2018-01-01

## 2018-01-01 RX ORDER — ASPIRIN 81 MG/1
81 TABLET ORAL DAILY
Status: DISCONTINUED | OUTPATIENT
Start: 2018-01-01 | End: 2018-01-01

## 2018-01-01 RX ORDER — HYDROXYZINE HYDROCHLORIDE 10 MG/1
10-20 TABLET, FILM COATED ORAL 3 TIMES DAILY PRN
Qty: 60 TABLET | Refills: 0 | Status: SHIPPED | OUTPATIENT
Start: 2018-01-01

## 2018-01-01 RX ORDER — PREDNISONE 20 MG/1
40 TABLET ORAL DAILY
Status: DISCONTINUED | OUTPATIENT
Start: 2018-01-01 | End: 2018-01-01 | Stop reason: HOSPADM

## 2018-01-01 RX ORDER — DEXTROSE MONOHYDRATE 25 G/50ML
25-50 INJECTION, SOLUTION INTRAVENOUS
Status: DISCONTINUED | OUTPATIENT
Start: 2018-01-01 | End: 2018-01-01

## 2018-01-01 RX ORDER — HYDROMORPHONE HYDROCHLORIDE 1 MG/ML
0.2 INJECTION, SOLUTION INTRAMUSCULAR; INTRAVENOUS; SUBCUTANEOUS
Status: DISCONTINUED | OUTPATIENT
Start: 2018-01-01 | End: 2018-01-01 | Stop reason: HOSPADM

## 2018-01-01 RX ORDER — IPRATROPIUM BROMIDE AND ALBUTEROL SULFATE 2.5; .5 MG/3ML; MG/3ML
3 SOLUTION RESPIRATORY (INHALATION)
Status: DISCONTINUED | OUTPATIENT
Start: 2018-01-01 | End: 2018-01-01 | Stop reason: HOSPADM

## 2018-01-01 RX ORDER — DILTIAZEM HYDROCHLORIDE 120 MG/1
120 CAPSULE, EXTENDED RELEASE ORAL DAILY
Status: DISCONTINUED | OUTPATIENT
Start: 2018-01-01 | End: 2018-01-01

## 2018-01-01 RX ORDER — SODIUM CHLORIDE 9 MG/ML
INJECTION, SOLUTION INTRAVENOUS CONTINUOUS
Status: DISCONTINUED | OUTPATIENT
Start: 2018-01-01 | End: 2018-01-01 | Stop reason: HOSPADM

## 2018-01-01 RX ORDER — IPRATROPIUM BROMIDE AND ALBUTEROL SULFATE 2.5; .5 MG/3ML; MG/3ML
3 SOLUTION RESPIRATORY (INHALATION) EVERY 4 HOURS PRN
Status: DISCONTINUED | OUTPATIENT
Start: 2018-01-01 | End: 2018-01-01 | Stop reason: HOSPADM

## 2018-01-01 RX ORDER — AMOXICILLIN 250 MG
1 CAPSULE ORAL 2 TIMES DAILY PRN
Status: DISCONTINUED | OUTPATIENT
Start: 2018-01-01 | End: 2018-01-01 | Stop reason: HOSPADM

## 2018-01-01 RX ORDER — LABETALOL HYDROCHLORIDE 5 MG/ML
10 INJECTION, SOLUTION INTRAVENOUS
Status: DISCONTINUED | OUTPATIENT
Start: 2018-01-01 | End: 2018-01-01

## 2018-01-01 RX ORDER — NALOXONE HYDROCHLORIDE 0.4 MG/ML
.1-.4 INJECTION, SOLUTION INTRAMUSCULAR; INTRAVENOUS; SUBCUTANEOUS
Status: DISCONTINUED | OUTPATIENT
Start: 2018-01-01 | End: 2018-01-01 | Stop reason: HOSPADM

## 2018-01-01 RX ORDER — SODIUM CHLORIDE 9 MG/ML
INJECTION, SOLUTION INTRAVENOUS CONTINUOUS
Status: DISCONTINUED | OUTPATIENT
Start: 2018-01-01 | End: 2018-01-01

## 2018-01-01 RX ORDER — AMLODIPINE BESYLATE 5 MG/1
5 TABLET ORAL 2 TIMES DAILY
Status: DISCONTINUED | OUTPATIENT
Start: 2018-01-01 | End: 2018-01-01

## 2018-01-01 RX ORDER — ALBUTEROL SULFATE 0.83 MG/ML
SOLUTION RESPIRATORY (INHALATION)
Status: COMPLETED
Start: 2018-01-01 | End: 2018-01-01

## 2018-01-01 RX ORDER — CEFDINIR 300 MG/1
300 CAPSULE ORAL 2 TIMES DAILY
Qty: 8 CAPSULE | Refills: 0 | Status: SHIPPED | OUTPATIENT
Start: 2018-01-01 | End: 2018-01-01

## 2018-01-01 RX ORDER — SODIUM CHLORIDE 9 MG/ML
INJECTION, SOLUTION INTRAVENOUS CONTINUOUS
Status: ACTIVE | OUTPATIENT
Start: 2018-01-01 | End: 2018-01-01

## 2018-01-01 RX ORDER — ACETAMINOPHEN 500 MG
500 TABLET ORAL EVERY 4 HOURS PRN
Status: DISCONTINUED | OUTPATIENT
Start: 2018-01-01 | End: 2018-01-01 | Stop reason: HOSPADM

## 2018-01-01 RX ORDER — POTASSIUM CL/LIDO/0.9 % NACL 10MEQ/0.1L
10 INTRAVENOUS SOLUTION, PIGGYBACK (ML) INTRAVENOUS
Status: DISCONTINUED | OUTPATIENT
Start: 2018-01-01 | End: 2018-01-01

## 2018-01-01 RX ORDER — ATROPINE SULFATE 10 MG/ML
1 SOLUTION/ DROPS OPHTHALMIC
Status: DISCONTINUED | OUTPATIENT
Start: 2018-01-01 | End: 2018-01-01

## 2018-01-01 RX ORDER — CEFUROXIME AXETIL 500 MG/1
500 TABLET ORAL EVERY 12 HOURS SCHEDULED
Status: DISCONTINUED | OUTPATIENT
Start: 2018-01-01 | End: 2018-01-01 | Stop reason: HOSPADM

## 2018-01-01 RX ORDER — POLYETHYLENE GLYCOL 3350 17 G/17G
17 POWDER, FOR SOLUTION ORAL 2 TIMES DAILY PRN
Status: DISCONTINUED | OUTPATIENT
Start: 2018-01-01 | End: 2018-01-01 | Stop reason: HOSPADM

## 2018-01-01 RX ORDER — POLYETHYLENE GLYCOL 3350 17 G/17G
17 POWDER, FOR SOLUTION ORAL DAILY PRN
Status: DISCONTINUED | OUTPATIENT
Start: 2018-01-01 | End: 2018-01-01 | Stop reason: HOSPADM

## 2018-01-01 RX ORDER — GUAIFENESIN, PSEUDOEPHEDRINE HYDROCHLORIDE 600; 60 MG/1; MG/1
1 TABLET, EXTENDED RELEASE ORAL 2 TIMES DAILY
Status: DISCONTINUED | OUTPATIENT
Start: 2018-01-01 | End: 2018-01-01

## 2018-01-01 RX ORDER — AZITHROMYCIN 250 MG/1
250 TABLET, FILM COATED ORAL DAILY
Status: COMPLETED | OUTPATIENT
Start: 2018-01-01 | End: 2018-01-01

## 2018-01-01 RX ORDER — POTASSIUM CHLORIDE 1.5 G/1.58G
20-40 POWDER, FOR SOLUTION ORAL
Status: DISCONTINUED | OUTPATIENT
Start: 2018-01-01 | End: 2018-01-01

## 2018-01-01 RX ORDER — IPRATROPIUM BROMIDE AND ALBUTEROL SULFATE 2.5; .5 MG/3ML; MG/3ML
3 SOLUTION RESPIRATORY (INHALATION)
Status: COMPLETED | OUTPATIENT
Start: 2018-01-01 | End: 2018-01-01

## 2018-01-01 RX ORDER — BISACODYL 10 MG
10 SUPPOSITORY, RECTAL RECTAL
Status: DISCONTINUED | OUTPATIENT
Start: 2018-07-29 | End: 2018-01-01 | Stop reason: HOSPADM

## 2018-01-01 RX ORDER — METHYLPREDNISOLONE SODIUM SUCCINATE 125 MG/2ML
60 INJECTION, POWDER, LYOPHILIZED, FOR SOLUTION INTRAMUSCULAR; INTRAVENOUS DAILY
Status: DISCONTINUED | OUTPATIENT
Start: 2018-01-01 | End: 2018-01-01

## 2018-01-01 RX ORDER — POTASSIUM CHLORIDE 1500 MG/1
20-40 TABLET, EXTENDED RELEASE ORAL
Status: DISCONTINUED | OUTPATIENT
Start: 2018-01-01 | End: 2018-01-01

## 2018-01-01 RX ORDER — HYDROXYZINE HYDROCHLORIDE 10 MG/1
10-20 TABLET, FILM COATED ORAL 3 TIMES DAILY PRN
Status: DISCONTINUED | OUTPATIENT
Start: 2018-01-01 | End: 2018-01-01

## 2018-01-01 RX ORDER — ACETAMINOPHEN 650 MG/1
650 SUPPOSITORY RECTAL EVERY 4 HOURS PRN
Status: DISCONTINUED | OUTPATIENT
Start: 2018-01-01 | End: 2018-01-01 | Stop reason: HOSPADM

## 2018-01-01 RX ORDER — OXYCODONE HYDROCHLORIDE 5 MG/1
5-10 TABLET ORAL
Status: DISCONTINUED | OUTPATIENT
Start: 2018-01-01 | End: 2018-01-01

## 2018-01-01 RX ORDER — ATROPINE SULFATE 0.1 MG/ML
.2-2 INJECTION INTRAVENOUS
Status: ACTIVE | OUTPATIENT
Start: 2018-01-01 | End: 2018-01-01

## 2018-01-01 RX ORDER — POTASSIUM CHLORIDE 7.45 MG/ML
10 INJECTION INTRAVENOUS
Status: DISCONTINUED | OUTPATIENT
Start: 2018-01-01 | End: 2018-01-01

## 2018-01-01 RX ORDER — PIPERACILLIN SODIUM, TAZOBACTAM SODIUM 4; .5 G/20ML; G/20ML
4.5 INJECTION, POWDER, LYOPHILIZED, FOR SOLUTION INTRAVENOUS ONCE
Status: COMPLETED | OUTPATIENT
Start: 2018-01-01 | End: 2018-01-01

## 2018-01-01 RX ORDER — LIDOCAINE 40 MG/G
CREAM TOPICAL
Status: DISCONTINUED | OUTPATIENT
Start: 2018-01-01 | End: 2018-01-01 | Stop reason: HOSPADM

## 2018-01-01 RX ORDER — GLYCOPYRROLATE 0.2 MG/ML
.2-.4 INJECTION, SOLUTION INTRAMUSCULAR; INTRAVENOUS EVERY 4 HOURS PRN
Status: DISCONTINUED | OUTPATIENT
Start: 2018-01-01 | End: 2018-01-01 | Stop reason: HOSPADM

## 2018-01-01 RX ADMIN — IPRATROPIUM BROMIDE AND ALBUTEROL SULFATE 3 ML: .5; 3 SOLUTION RESPIRATORY (INHALATION) at 16:04

## 2018-01-01 RX ADMIN — IPRATROPIUM BROMIDE AND ALBUTEROL SULFATE 3 ML: .5; 3 SOLUTION RESPIRATORY (INHALATION) at 08:08

## 2018-01-01 RX ADMIN — BUDESONIDE 0.5 MG: 0.5 INHALANT RESPIRATORY (INHALATION) at 07:42

## 2018-01-01 RX ADMIN — IPRATROPIUM BROMIDE AND ALBUTEROL SULFATE 3 ML: .5; 3 SOLUTION RESPIRATORY (INHALATION) at 15:09

## 2018-01-01 RX ADMIN — IPRATROPIUM BROMIDE AND ALBUTEROL SULFATE 3 ML: .5; 3 SOLUTION RESPIRATORY (INHALATION) at 07:10

## 2018-01-01 RX ADMIN — ASPIRIN 81 MG 81 MG: 81 TABLET ORAL at 08:45

## 2018-01-01 RX ADMIN — GUAIFENESIN 1200 MG: 600 TABLET, EXTENDED RELEASE ORAL at 08:44

## 2018-01-01 RX ADMIN — IPRATROPIUM BROMIDE AND ALBUTEROL SULFATE 3 ML: .5; 3 SOLUTION RESPIRATORY (INHALATION) at 19:58

## 2018-01-01 RX ADMIN — CEFTRIAXONE SODIUM 1 G: 1 INJECTION, POWDER, FOR SOLUTION INTRAMUSCULAR; INTRAVENOUS at 16:53

## 2018-01-01 RX ADMIN — METHYLPREDNISOLONE SODIUM SUCCINATE 62.5 MG: 125 INJECTION, POWDER, FOR SOLUTION INTRAMUSCULAR; INTRAVENOUS at 22:17

## 2018-01-01 RX ADMIN — SODIUM CHLORIDE 80 ML: 9 INJECTION, SOLUTION INTRAVENOUS at 11:20

## 2018-01-01 RX ADMIN — CEFTRIAXONE 1 G: 1 INJECTION, POWDER, FOR SOLUTION INTRAMUSCULAR; INTRAVENOUS at 13:33

## 2018-01-01 RX ADMIN — HYDROXYZINE HYDROCHLORIDE 10 MG: 10 TABLET ORAL at 06:49

## 2018-01-01 RX ADMIN — IPRATROPIUM BROMIDE AND ALBUTEROL SULFATE 3 ML: .5; 3 SOLUTION RESPIRATORY (INHALATION) at 23:38

## 2018-01-01 RX ADMIN — GLYCOPYRROLATE 0.2 MG: 0.2 INJECTION, SOLUTION INTRAMUSCULAR; INTRAVENOUS at 14:51

## 2018-01-01 RX ADMIN — OYSTER SHELL CALCIUM WITH VITAMIN D 1 TABLET: 500; 200 TABLET, FILM COATED ORAL at 08:00

## 2018-01-01 RX ADMIN — Medication 2000 UNITS: at 05:15

## 2018-01-01 RX ADMIN — METHYLPREDNISOLONE SODIUM SUCCINATE 62.5 MG: 125 INJECTION, POWDER, FOR SOLUTION INTRAMUSCULAR; INTRAVENOUS at 09:48

## 2018-01-01 RX ADMIN — SENNOSIDES AND DOCUSATE SODIUM 1 TABLET: 8.6; 5 TABLET ORAL at 20:48

## 2018-01-01 RX ADMIN — BUDESONIDE 0.5 MG: 0.5 INHALANT RESPIRATORY (INHALATION) at 20:23

## 2018-01-01 RX ADMIN — BUDESONIDE 0.5 MG: 0.5 INHALANT RESPIRATORY (INHALATION) at 19:44

## 2018-01-01 RX ADMIN — IPRATROPIUM BROMIDE AND ALBUTEROL SULFATE 3 ML: .5; 3 SOLUTION RESPIRATORY (INHALATION) at 07:42

## 2018-01-01 RX ADMIN — Medication 2000 UNITS: at 00:04

## 2018-01-01 RX ADMIN — METHYLPREDNISOLONE SODIUM SUCCINATE 62.5 MG: 125 INJECTION, POWDER, FOR SOLUTION INTRAMUSCULAR; INTRAVENOUS at 20:09

## 2018-01-01 RX ADMIN — ALBUTEROL SULFATE 2.5 MG: 2.5 SOLUTION RESPIRATORY (INHALATION) at 13:31

## 2018-01-01 RX ADMIN — ARFORMOTEROL TARTRATE 15 MCG: 15 SOLUTION RESPIRATORY (INHALATION) at 07:39

## 2018-01-01 RX ADMIN — IPRATROPIUM BROMIDE AND ALBUTEROL SULFATE 3 ML: .5; 3 SOLUTION RESPIRATORY (INHALATION) at 11:40

## 2018-01-01 RX ADMIN — ARFORMOTEROL TARTRATE 15 MCG: 15 SOLUTION RESPIRATORY (INHALATION) at 07:30

## 2018-01-01 RX ADMIN — IPRATROPIUM BROMIDE AND ALBUTEROL SULFATE 3 ML: .5; 3 SOLUTION RESPIRATORY (INHALATION) at 23:33

## 2018-01-01 RX ADMIN — METHYLPREDNISOLONE SODIUM SUCCINATE 62.5 MG: 125 INJECTION, POWDER, FOR SOLUTION INTRAMUSCULAR; INTRAVENOUS at 22:08

## 2018-01-01 RX ADMIN — OYSTER SHELL CALCIUM WITH VITAMIN D 1 TABLET: 500; 200 TABLET, FILM COATED ORAL at 09:49

## 2018-01-01 RX ADMIN — IPRATROPIUM BROMIDE AND ALBUTEROL SULFATE 3 ML: .5; 3 SOLUTION RESPIRATORY (INHALATION) at 07:28

## 2018-01-01 RX ADMIN — BUDESONIDE 0.5 MG: 0.5 INHALANT RESPIRATORY (INHALATION) at 07:06

## 2018-01-01 RX ADMIN — METHYLPREDNISOLONE SODIUM SUCCINATE 125 MG: 125 INJECTION, POWDER, FOR SOLUTION INTRAMUSCULAR; INTRAVENOUS at 16:18

## 2018-01-01 RX ADMIN — IPRATROPIUM BROMIDE AND ALBUTEROL SULFATE 3 ML: .5; 3 SOLUTION RESPIRATORY (INHALATION) at 12:10

## 2018-01-01 RX ADMIN — PIPERACILLIN SODIUM,TAZOBACTAM SODIUM 4.5 G: 4; .5 INJECTION, POWDER, FOR SOLUTION INTRAVENOUS at 13:16

## 2018-01-01 RX ADMIN — IPRATROPIUM BROMIDE AND ALBUTEROL SULFATE 3 ML: .5; 3 SOLUTION RESPIRATORY (INHALATION) at 19:50

## 2018-01-01 RX ADMIN — ARFORMOTEROL TARTRATE 15 MCG: 15 SOLUTION RESPIRATORY (INHALATION) at 19:44

## 2018-01-01 RX ADMIN — ARFORMOTEROL TARTRATE 15 MCG: 15 SOLUTION RESPIRATORY (INHALATION) at 19:38

## 2018-01-01 RX ADMIN — SENNOSIDES AND DOCUSATE SODIUM 1 TABLET: 8.6; 5 TABLET ORAL at 20:38

## 2018-01-01 RX ADMIN — IPRATROPIUM BROMIDE AND ALBUTEROL SULFATE 3 ML: .5; 3 SOLUTION RESPIRATORY (INHALATION) at 11:51

## 2018-01-01 RX ADMIN — IPRATROPIUM BROMIDE AND ALBUTEROL SULFATE 3 ML: .5; 3 SOLUTION RESPIRATORY (INHALATION) at 19:37

## 2018-01-01 RX ADMIN — IPRATROPIUM BROMIDE AND ALBUTEROL SULFATE 3 ML: .5; 3 SOLUTION RESPIRATORY (INHALATION) at 15:37

## 2018-01-01 RX ADMIN — ALBUTEROL SULFATE 2.5 MG: 2.5 SOLUTION RESPIRATORY (INHALATION) at 04:19

## 2018-01-01 RX ADMIN — IPRATROPIUM BROMIDE AND ALBUTEROL SULFATE 3 ML: .5; 3 SOLUTION RESPIRATORY (INHALATION) at 19:22

## 2018-01-01 RX ADMIN — IOPAMIDOL 52 ML: 755 INJECTION, SOLUTION INTRAVENOUS at 11:20

## 2018-01-01 RX ADMIN — OYSTER SHELL CALCIUM WITH VITAMIN D 1 TABLET: 500; 200 TABLET, FILM COATED ORAL at 08:23

## 2018-01-01 RX ADMIN — ASPIRIN 325 MG ORAL TABLET 325 MG: 325 PILL ORAL at 15:38

## 2018-01-01 RX ADMIN — BUDESONIDE 0.5 MG: 0.5 INHALANT RESPIRATORY (INHALATION) at 07:49

## 2018-01-01 RX ADMIN — IPRATROPIUM BROMIDE AND ALBUTEROL SULFATE 3 ML: .5; 3 SOLUTION RESPIRATORY (INHALATION) at 17:02

## 2018-01-01 RX ADMIN — MORPHINE SULFATE 4 MG: 2 INJECTION, SOLUTION INTRAMUSCULAR; INTRAVENOUS at 14:41

## 2018-01-01 RX ADMIN — GUAIFENESIN 1200 MG: 600 TABLET, EXTENDED RELEASE ORAL at 10:13

## 2018-01-01 RX ADMIN — METHYLPREDNISOLONE SODIUM SUCCINATE 62.5 MG: 125 INJECTION, POWDER, FOR SOLUTION INTRAMUSCULAR; INTRAVENOUS at 04:31

## 2018-01-01 RX ADMIN — METHYLPREDNISOLONE SODIUM SUCCINATE 62.5 MG: 125 INJECTION, POWDER, FOR SOLUTION INTRAMUSCULAR; INTRAVENOUS at 15:00

## 2018-01-01 RX ADMIN — PREDNISONE 40 MG: 20 TABLET ORAL at 08:40

## 2018-01-01 RX ADMIN — AZITHROMYCIN MONOHYDRATE 250 MG: 500 INJECTION, POWDER, LYOPHILIZED, FOR SOLUTION INTRAVENOUS at 14:23

## 2018-01-01 RX ADMIN — BUDESONIDE 0.5 MG: 0.5 INHALANT RESPIRATORY (INHALATION) at 21:07

## 2018-01-01 RX ADMIN — ASPIRIN 81 MG: 81 TABLET, COATED ORAL at 08:43

## 2018-01-01 RX ADMIN — IPRATROPIUM BROMIDE AND ALBUTEROL SULFATE 3 ML: .5; 3 SOLUTION RESPIRATORY (INHALATION) at 21:07

## 2018-01-01 RX ADMIN — Medication 0.5 MG: at 00:28

## 2018-01-01 RX ADMIN — PREDNISONE 60 MG: 20 TABLET ORAL at 12:14

## 2018-01-01 RX ADMIN — IPRATROPIUM BROMIDE AND ALBUTEROL SULFATE 3 ML: .5; 3 SOLUTION RESPIRATORY (INHALATION) at 15:30

## 2018-01-01 RX ADMIN — IPRATROPIUM BROMIDE AND ALBUTEROL SULFATE 3 ML: .5; 3 SOLUTION RESPIRATORY (INHALATION) at 08:00

## 2018-01-01 RX ADMIN — OYSTER SHELL CALCIUM WITH VITAMIN D 1 TABLET: 500; 200 TABLET, FILM COATED ORAL at 08:40

## 2018-01-01 RX ADMIN — OYSTER SHELL CALCIUM WITH VITAMIN D 1 TABLET: 500; 200 TABLET, FILM COATED ORAL at 09:46

## 2018-01-01 RX ADMIN — SODIUM CHLORIDE: 9 INJECTION, SOLUTION INTRAVENOUS at 16:34

## 2018-01-01 RX ADMIN — MORPHINE SULFATE 4 MG: 2 INJECTION, SOLUTION INTRAMUSCULAR; INTRAVENOUS at 15:20

## 2018-01-01 RX ADMIN — GUAIFENESIN AND DEXTROMETHORPHAN HYDROBROMIDE 1 TABLET: 600; 30 TABLET, EXTENDED RELEASE ORAL at 21:47

## 2018-01-01 RX ADMIN — ARFORMOTEROL TARTRATE 15 MCG: 15 SOLUTION RESPIRATORY (INHALATION) at 19:54

## 2018-01-01 RX ADMIN — BUDESONIDE 0.5 MG: 0.5 INHALANT RESPIRATORY (INHALATION) at 19:32

## 2018-01-01 RX ADMIN — ARFORMOTEROL TARTRATE 15 MCG: 15 SOLUTION RESPIRATORY (INHALATION) at 19:10

## 2018-01-01 RX ADMIN — MORPHINE SULFATE 5 MG: 100 SOLUTION ORAL at 10:26

## 2018-01-01 RX ADMIN — ASPIRIN 81 MG 81 MG: 81 TABLET ORAL at 08:23

## 2018-01-01 RX ADMIN — GUAIFENESIN 1200 MG: 600 TABLET, EXTENDED RELEASE ORAL at 21:26

## 2018-01-01 RX ADMIN — HEPARIN SODIUM 550 UNITS/HR: 10000 INJECTION, SOLUTION INTRAVENOUS at 16:44

## 2018-01-01 RX ADMIN — CEFTRIAXONE SODIUM 1 G: 1 INJECTION, POWDER, FOR SOLUTION INTRAMUSCULAR; INTRAVENOUS at 17:20

## 2018-01-01 RX ADMIN — PREDNISONE 30 MG: 20 TABLET ORAL at 08:40

## 2018-01-01 RX ADMIN — ARFORMOTEROL TARTRATE 15 MCG: 15 SOLUTION RESPIRATORY (INHALATION) at 07:42

## 2018-01-01 RX ADMIN — ARFORMOTEROL TARTRATE 15 MCG: 15 SOLUTION RESPIRATORY (INHALATION) at 19:32

## 2018-01-01 RX ADMIN — CEFTRIAXONE SODIUM 1 G: 1 INJECTION, POWDER, FOR SOLUTION INTRAMUSCULAR; INTRAVENOUS at 18:09

## 2018-01-01 RX ADMIN — Medication 2750 UNITS: at 14:36

## 2018-01-01 RX ADMIN — IPRATROPIUM BROMIDE AND ALBUTEROL SULFATE 3 ML: .5; 3 SOLUTION RESPIRATORY (INHALATION) at 11:54

## 2018-01-01 RX ADMIN — IPRATROPIUM BROMIDE AND ALBUTEROL SULFATE 3 ML: .5; 3 SOLUTION RESPIRATORY (INHALATION) at 20:18

## 2018-01-01 RX ADMIN — AZITHROMYCIN MONOHYDRATE 500 MG: 500 INJECTION, POWDER, LYOPHILIZED, FOR SOLUTION INTRAVENOUS at 18:08

## 2018-01-01 RX ADMIN — IPRATROPIUM BROMIDE AND ALBUTEROL SULFATE 3 ML: .5; 3 SOLUTION RESPIRATORY (INHALATION) at 07:39

## 2018-01-01 RX ADMIN — BUDESONIDE 0.5 MG: 0.5 INHALANT RESPIRATORY (INHALATION) at 19:22

## 2018-01-01 RX ADMIN — GUAIFENESIN AND DEXTROMETHORPHAN HYDROBROMIDE 1 TABLET: 600; 30 TABLET, EXTENDED RELEASE ORAL at 08:43

## 2018-01-01 RX ADMIN — OYSTER SHELL CALCIUM WITH VITAMIN D 1 TABLET: 500; 200 TABLET, FILM COATED ORAL at 08:44

## 2018-01-01 RX ADMIN — IPRATROPIUM BROMIDE AND ALBUTEROL SULFATE 3 ML: .5; 3 SOLUTION RESPIRATORY (INHALATION) at 17:41

## 2018-01-01 RX ADMIN — OYSTER SHELL CALCIUM WITH VITAMIN D 1 TABLET: 500; 200 TABLET, FILM COATED ORAL at 10:22

## 2018-01-01 RX ADMIN — BUDESONIDE 0.5 MG: 0.5 INHALANT RESPIRATORY (INHALATION) at 20:18

## 2018-01-01 RX ADMIN — METHYLPREDNISOLONE SODIUM SUCCINATE: 125 INJECTION, POWDER, FOR SOLUTION INTRAMUSCULAR; INTRAVENOUS at 09:29

## 2018-01-01 RX ADMIN — IPRATROPIUM BROMIDE AND ALBUTEROL SULFATE 3 ML: .5; 3 SOLUTION RESPIRATORY (INHALATION) at 11:02

## 2018-01-01 RX ADMIN — IPRATROPIUM BROMIDE AND ALBUTEROL SULFATE 3 ML: .5; 3 SOLUTION RESPIRATORY (INHALATION) at 23:32

## 2018-01-01 RX ADMIN — ARFORMOTEROL TARTRATE 15 MCG: 15 SOLUTION RESPIRATORY (INHALATION) at 19:50

## 2018-01-01 RX ADMIN — BUDESONIDE 0.5 MG: 0.5 INHALANT RESPIRATORY (INHALATION) at 19:58

## 2018-01-01 RX ADMIN — ASPIRIN 81 MG 81 MG: 81 TABLET ORAL at 08:40

## 2018-01-01 RX ADMIN — METHYLPREDNISOLONE SODIUM SUCCINATE 62.5 MG: 125 INJECTION, POWDER, FOR SOLUTION INTRAMUSCULAR; INTRAVENOUS at 09:54

## 2018-01-01 RX ADMIN — INSULIN ASPART 1 UNITS: 100 INJECTION, SOLUTION INTRAVENOUS; SUBCUTANEOUS at 08:33

## 2018-01-01 RX ADMIN — PREDNISONE 40 MG: 20 TABLET ORAL at 08:01

## 2018-01-01 RX ADMIN — METHYLPREDNISOLONE SODIUM SUCCINATE 62.5 MG: 125 INJECTION, POWDER, FOR SOLUTION INTRAMUSCULAR; INTRAVENOUS at 08:22

## 2018-01-01 RX ADMIN — Medication 0.5 MG: at 01:58

## 2018-01-01 RX ADMIN — ARFORMOTEROL TARTRATE 15 MCG: 15 SOLUTION RESPIRATORY (INHALATION) at 08:08

## 2018-01-01 RX ADMIN — MORPHINE SULFATE 5 MG: 100 SOLUTION ORAL at 04:24

## 2018-01-01 RX ADMIN — GLYCOPYRROLATE 0.2 MG: 0.2 INJECTION, SOLUTION INTRAMUSCULAR; INTRAVENOUS at 10:27

## 2018-01-01 RX ADMIN — IPRATROPIUM BROMIDE AND ALBUTEROL SULFATE 3 ML: .5; 3 SOLUTION RESPIRATORY (INHALATION) at 07:07

## 2018-01-01 RX ADMIN — Medication 2700 UNITS: at 21:11

## 2018-01-01 RX ADMIN — BUDESONIDE 0.5 MG: 0.5 INHALANT RESPIRATORY (INHALATION) at 08:07

## 2018-01-01 RX ADMIN — ASPIRIN 81 MG 81 MG: 81 TABLET ORAL at 09:49

## 2018-01-01 RX ADMIN — ARFORMOTEROL TARTRATE 15 MCG: 15 SOLUTION RESPIRATORY (INHALATION) at 08:01

## 2018-01-01 RX ADMIN — AMLODIPINE BESYLATE 5 MG: 5 TABLET ORAL at 20:38

## 2018-01-01 RX ADMIN — ARFORMOTEROL TARTRATE 15 MCG: 15 SOLUTION RESPIRATORY (INHALATION) at 20:18

## 2018-01-01 RX ADMIN — ASPIRIN 81 MG 81 MG: 81 TABLET ORAL at 09:44

## 2018-01-01 RX ADMIN — CEFTRIAXONE SODIUM 1 G: 1 INJECTION, POWDER, FOR SOLUTION INTRAMUSCULAR; INTRAVENOUS at 17:48

## 2018-01-01 RX ADMIN — ASPIRIN 81 MG: 81 TABLET, COATED ORAL at 08:32

## 2018-01-01 RX ADMIN — HYDROXYZINE HYDROCHLORIDE 10 MG: 10 TABLET ORAL at 23:00

## 2018-01-01 RX ADMIN — METHYLPREDNISOLONE SODIUM SUCCINATE 125 MG: 125 INJECTION, POWDER, FOR SOLUTION INTRAMUSCULAR; INTRAVENOUS at 13:31

## 2018-01-01 RX ADMIN — HEPARIN SODIUM 550 UNITS/HR: 10000 INJECTION, SOLUTION INTRAVENOUS at 21:11

## 2018-01-01 RX ADMIN — HEPARIN SODIUM 750 UNITS/HR: 10000 INJECTION, SOLUTION INTRAVENOUS at 00:06

## 2018-01-01 RX ADMIN — ARFORMOTEROL TARTRATE 15 MCG: 15 SOLUTION RESPIRATORY (INHALATION) at 07:07

## 2018-01-01 RX ADMIN — IPRATROPIUM BROMIDE AND ALBUTEROL SULFATE 3 ML: .5; 3 SOLUTION RESPIRATORY (INHALATION) at 16:21

## 2018-01-01 RX ADMIN — IPRATROPIUM BROMIDE AND ALBUTEROL SULFATE 3 ML: .5; 3 SOLUTION RESPIRATORY (INHALATION) at 07:30

## 2018-01-01 RX ADMIN — HYDROXYZINE HYDROCHLORIDE 10 MG: 10 TABLET ORAL at 21:52

## 2018-01-01 RX ADMIN — BUDESONIDE 0.5 MG: 0.5 INHALANT RESPIRATORY (INHALATION) at 08:01

## 2018-01-01 RX ADMIN — ARFORMOTEROL TARTRATE 15 MCG: 15 SOLUTION RESPIRATORY (INHALATION) at 20:23

## 2018-01-01 RX ADMIN — ASPIRIN 325 MG ORAL TABLET 325 MG: 325 PILL ORAL at 16:44

## 2018-01-01 RX ADMIN — AZITHROMYCIN MONOHYDRATE 250 MG: 250 TABLET ORAL at 09:49

## 2018-01-01 RX ADMIN — SENNOSIDES AND DOCUSATE SODIUM 1 TABLET: 8.6; 5 TABLET ORAL at 08:43

## 2018-01-01 RX ADMIN — CEFUROXIME AXETIL 500 MG: 500 TABLET ORAL at 12:18

## 2018-01-01 RX ADMIN — CEFTRIAXONE SODIUM 1 G: 1 INJECTION, POWDER, FOR SOLUTION INTRAMUSCULAR; INTRAVENOUS at 16:23

## 2018-01-01 RX ADMIN — CEFTRIAXONE 2 G: 2 INJECTION, POWDER, FOR SOLUTION INTRAMUSCULAR; INTRAVENOUS at 12:47

## 2018-01-01 RX ADMIN — MORPHINE SULFATE 5 MG: 100 SOLUTION ORAL at 06:42

## 2018-01-01 RX ADMIN — CEFTRIAXONE SODIUM 1 G: 1 INJECTION, POWDER, FOR SOLUTION INTRAMUSCULAR; INTRAVENOUS at 17:45

## 2018-01-01 RX ADMIN — HYDRALAZINE HYDROCHLORIDE 10 MG: 20 INJECTION INTRAMUSCULAR; INTRAVENOUS at 18:15

## 2018-01-01 RX ADMIN — HEPARIN SODIUM 650 UNITS/HR: 10000 INJECTION, SOLUTION INTRAVENOUS at 08:34

## 2018-01-01 RX ADMIN — HEPARIN SODIUM 550 UNITS/HR: 10000 INJECTION, SOLUTION INTRAVENOUS at 14:35

## 2018-01-01 RX ADMIN — METHYLPREDNISOLONE SODIUM SUCCINATE 62.5 MG: 125 INJECTION, POWDER, FOR SOLUTION INTRAMUSCULAR; INTRAVENOUS at 02:39

## 2018-01-01 RX ADMIN — BUDESONIDE 0.5 MG: 0.5 INHALANT RESPIRATORY (INHALATION) at 19:50

## 2018-01-01 RX ADMIN — OYSTER SHELL CALCIUM WITH VITAMIN D 1 TABLET: 500; 200 TABLET, FILM COATED ORAL at 09:45

## 2018-01-01 RX ADMIN — SENNOSIDES AND DOCUSATE SODIUM 1 TABLET: 8.6; 5 TABLET ORAL at 08:34

## 2018-01-01 RX ADMIN — ARFORMOTEROL TARTRATE 30 MCG: 15 SOLUTION RESPIRATORY (INHALATION) at 07:46

## 2018-01-01 RX ADMIN — IPRATROPIUM BROMIDE AND ALBUTEROL SULFATE 3 ML: .5; 3 SOLUTION RESPIRATORY (INHALATION) at 19:44

## 2018-01-01 RX ADMIN — IPRATROPIUM BROMIDE AND ALBUTEROL SULFATE 3 ML: .5; 3 SOLUTION RESPIRATORY (INHALATION) at 12:52

## 2018-01-01 RX ADMIN — IPRATROPIUM BROMIDE AND ALBUTEROL SULFATE 3 ML: .5; 3 SOLUTION RESPIRATORY (INHALATION) at 15:08

## 2018-01-01 RX ADMIN — AMLODIPINE BESYLATE 5 MG: 5 TABLET ORAL at 08:01

## 2018-01-01 RX ADMIN — IPRATROPIUM BROMIDE AND ALBUTEROL SULFATE 3 ML: .5; 3 SOLUTION RESPIRATORY (INHALATION) at 20:23

## 2018-01-01 RX ADMIN — ARFORMOTEROL TARTRATE 15 MCG: 15 SOLUTION RESPIRATORY (INHALATION) at 07:17

## 2018-01-01 RX ADMIN — IPRATROPIUM BROMIDE AND ALBUTEROL SULFATE 3 ML: .5; 3 SOLUTION RESPIRATORY (INHALATION) at 15:44

## 2018-01-01 RX ADMIN — ALBUTEROL SULFATE 2.5 MG: 2.5 SOLUTION RESPIRATORY (INHALATION) at 21:11

## 2018-01-01 RX ADMIN — ARFORMOTEROL TARTRATE 15 MCG: 15 SOLUTION RESPIRATORY (INHALATION) at 07:32

## 2018-01-01 RX ADMIN — ARFORMOTEROL TARTRATE 15 MCG: 15 SOLUTION RESPIRATORY (INHALATION) at 19:37

## 2018-01-01 RX ADMIN — HUMAN ALBUMIN MICROSPHERES AND PERFLUTREN 9 ML: 10; .22 INJECTION, SOLUTION INTRAVENOUS at 11:30

## 2018-01-01 RX ADMIN — METHYLPREDNISOLONE SODIUM SUCCINATE 62.5 MG: 125 INJECTION, POWDER, FOR SOLUTION INTRAMUSCULAR; INTRAVENOUS at 21:00

## 2018-01-01 RX ADMIN — Medication 2300 UNITS: at 00:53

## 2018-01-01 RX ADMIN — BUDESONIDE 0.5 MG: 0.5 INHALANT RESPIRATORY (INHALATION) at 07:30

## 2018-01-01 RX ADMIN — DOBUTAMINE IN DEXTROSE 10 MCG/KG/MIN: 200 INJECTION, SOLUTION INTRAVENOUS at 10:50

## 2018-01-01 RX ADMIN — BUDESONIDE 0.5 MG: 0.5 INHALANT RESPIRATORY (INHALATION) at 19:54

## 2018-01-01 RX ADMIN — BUDESONIDE 0.5 MG: 0.5 INHALANT RESPIRATORY (INHALATION) at 07:10

## 2018-01-01 RX ADMIN — IPRATROPIUM BROMIDE AND ALBUTEROL SULFATE 3 ML: .5; 3 SOLUTION RESPIRATORY (INHALATION) at 13:26

## 2018-01-01 RX ADMIN — PREDNISONE 40 MG: 20 TABLET ORAL at 08:45

## 2018-01-01 RX ADMIN — PREDNISONE 40 MG: 20 TABLET ORAL at 19:57

## 2018-01-01 RX ADMIN — ARFORMOTEROL TARTRATE 15 MCG: 15 SOLUTION RESPIRATORY (INHALATION) at 20:17

## 2018-01-01 RX ADMIN — BUDESONIDE 0.5 MG: 0.5 INHALANT RESPIRATORY (INHALATION) at 07:29

## 2018-01-01 RX ADMIN — METHYLPREDNISOLONE SODIUM SUCCINATE 62.5 MG: 125 INJECTION, POWDER, FOR SOLUTION INTRAMUSCULAR; INTRAVENOUS at 01:46

## 2018-01-01 RX ADMIN — PREDNISONE 40 MG: 20 TABLET ORAL at 09:45

## 2018-01-01 RX ADMIN — SODIUM CHLORIDE: 9 INJECTION, SOLUTION INTRAVENOUS at 19:45

## 2018-01-01 RX ADMIN — AZITHROMYCIN MONOHYDRATE 500 MG: 500 INJECTION, POWDER, LYOPHILIZED, FOR SOLUTION INTRAVENOUS at 14:10

## 2018-01-01 RX ADMIN — BUDESONIDE 0.5 MG: 0.5 INHALANT RESPIRATORY (INHALATION) at 20:17

## 2018-01-01 RX ADMIN — OYSTER SHELL CALCIUM WITH VITAMIN D 1 TABLET: 500; 200 TABLET, FILM COATED ORAL at 08:41

## 2018-01-01 RX ADMIN — BUDESONIDE 0.5 MG: 0.5 INHALANT RESPIRATORY (INHALATION) at 07:15

## 2018-01-01 RX ADMIN — SENNOSIDES AND DOCUSATE SODIUM 1 TABLET: 8.6; 5 TABLET ORAL at 08:02

## 2018-01-01 RX ADMIN — AZITHROMYCIN MONOHYDRATE 250 MG: 500 INJECTION, POWDER, LYOPHILIZED, FOR SOLUTION INTRAVENOUS at 14:41

## 2018-01-01 RX ADMIN — IPRATROPIUM BROMIDE AND ALBUTEROL SULFATE 3 ML: .5; 3 SOLUTION RESPIRATORY (INHALATION) at 19:10

## 2018-01-01 RX ADMIN — AZITHROMYCIN MONOHYDRATE 250 MG: 500 INJECTION, POWDER, LYOPHILIZED, FOR SOLUTION INTRAVENOUS at 14:29

## 2018-01-01 RX ADMIN — AZITHROMYCIN MONOHYDRATE 250 MG: 250 TABLET ORAL at 08:40

## 2018-01-01 RX ADMIN — IOPAMIDOL 49 ML: 755 INJECTION, SOLUTION INTRAVENOUS at 14:23

## 2018-01-01 RX ADMIN — IPRATROPIUM BROMIDE AND ALBUTEROL SULFATE 3 ML: .5; 3 SOLUTION RESPIRATORY (INHALATION) at 11:35

## 2018-01-01 RX ADMIN — AZITHROMYCIN MONOHYDRATE 250 MG: 250 TABLET ORAL at 08:22

## 2018-01-01 RX ADMIN — BUDESONIDE 0.5 MG: 0.5 INHALANT RESPIRATORY (INHALATION) at 19:37

## 2018-01-01 RX ADMIN — IPRATROPIUM BROMIDE AND ALBUTEROL SULFATE 3 ML: .5; 3 SOLUTION RESPIRATORY (INHALATION) at 11:22

## 2018-01-01 RX ADMIN — SODIUM CHLORIDE 500 ML: 9 INJECTION, SOLUTION INTRAVENOUS at 12:25

## 2018-01-01 RX ADMIN — BUDESONIDE 0.5 MG: 0.5 INHALANT RESPIRATORY (INHALATION) at 08:08

## 2018-01-01 RX ADMIN — CEFTRIAXONE 2 G: 2 INJECTION, POWDER, FOR SOLUTION INTRAMUSCULAR; INTRAVENOUS at 12:53

## 2018-01-01 RX ADMIN — BUDESONIDE 0.5 MG: 0.5 INHALANT RESPIRATORY (INHALATION) at 19:38

## 2018-01-01 RX ADMIN — Medication 2800 UNITS: at 16:44

## 2018-01-01 RX ADMIN — IPRATROPIUM BROMIDE AND ALBUTEROL SULFATE 3 ML: .5; 3 SOLUTION RESPIRATORY (INHALATION) at 12:05

## 2018-01-01 RX ADMIN — METHYLPREDNISOLONE SODIUM SUCCINATE 62.5 MG: 125 INJECTION, POWDER, FOR SOLUTION INTRAMUSCULAR; INTRAVENOUS at 14:08

## 2018-01-01 RX ADMIN — BUDESONIDE 0.5 MG: 0.5 INHALANT RESPIRATORY (INHALATION) at 07:39

## 2018-01-01 RX ADMIN — IPRATROPIUM BROMIDE AND ALBUTEROL SULFATE 3 ML: .5; 3 SOLUTION RESPIRATORY (INHALATION) at 07:29

## 2018-01-01 RX ADMIN — ARFORMOTEROL TARTRATE 15 MCG: 15 SOLUTION RESPIRATORY (INHALATION) at 08:07

## 2018-01-01 RX ADMIN — AZITHROMYCIN MONOHYDRATE 250 MG: 250 TABLET ORAL at 09:45

## 2018-01-01 RX ADMIN — ASPIRIN 81 MG 81 MG: 81 TABLET ORAL at 08:41

## 2018-01-01 RX ADMIN — SODIUM CHLORIDE 78 ML: 9 INJECTION, SOLUTION INTRAVENOUS at 14:24

## 2018-01-01 RX ADMIN — ASPIRIN 81 MG: 81 TABLET, COATED ORAL at 21:03

## 2018-01-01 RX ADMIN — BUDESONIDE 0.5 MG: 0.5 INHALANT RESPIRATORY (INHALATION) at 19:10

## 2018-01-01 RX ADMIN — IPRATROPIUM BROMIDE AND ALBUTEROL SULFATE 3 ML: .5; 3 SOLUTION RESPIRATORY (INHALATION) at 19:32

## 2018-01-01 RX ADMIN — METHYLPREDNISOLONE SODIUM SUCCINATE 125 MG: 125 INJECTION, POWDER, FOR SOLUTION INTRAMUSCULAR; INTRAVENOUS at 13:18

## 2018-01-01 RX ADMIN — METHYLPREDNISOLONE SODIUM SUCCINATE 62.5 MG: 125 INJECTION, POWDER, FOR SOLUTION INTRAMUSCULAR; INTRAVENOUS at 20:17

## 2018-01-01 RX ADMIN — IPRATROPIUM BROMIDE AND ALBUTEROL SULFATE 3 ML: .5; 3 SOLUTION RESPIRATORY (INHALATION) at 08:07

## 2018-01-01 RX ADMIN — IPRATROPIUM BROMIDE AND ALBUTEROL SULFATE 3 ML: .5; 3 SOLUTION RESPIRATORY (INHALATION) at 19:38

## 2018-01-01 RX ADMIN — DILTIAZEM HYDROCHLORIDE 120 MG: 120 CAPSULE, EXTENDED RELEASE ORAL at 21:03

## 2018-01-01 RX ADMIN — IPRATROPIUM BROMIDE AND ALBUTEROL SULFATE 3 ML: .5; 3 SOLUTION RESPIRATORY (INHALATION) at 15:14

## 2018-01-01 RX ADMIN — HYDROXYZINE HYDROCHLORIDE 10 MG: 10 TABLET ORAL at 09:45

## 2018-01-01 RX ADMIN — HYDROXYZINE HYDROCHLORIDE 10 MG: 10 TABLET ORAL at 13:12

## 2018-01-01 RX ADMIN — DILTIAZEM HYDROCHLORIDE 120 MG: 120 CAPSULE, EXTENDED RELEASE ORAL at 08:34

## 2018-01-01 ASSESSMENT — ACTIVITIES OF DAILY LIVING (ADL)
TRANSFERRING: 0-->INDEPENDENT
AMBULATION: 0-->INDEPENDENT
ADLS_ACUITY_SCORE: 15
TOILETING: 0-->INDEPENDENT
ADLS_ACUITY_SCORE: 15
RETIRED_COMMUNICATION: 0-->UNDERSTANDS/COMMUNICATES WITHOUT DIFFICULTY
ADLS_ACUITY_SCORE: 16
RETIRED_EATING: 0-->INDEPENDENT
ADLS_ACUITY_SCORE: 16
COGNITION: 0 - NO COGNITION ISSUES REPORTED
SWALLOWING: 0-->SWALLOWS FOODS/LIQUIDS WITHOUT DIFFICULTY
FALL_HISTORY_WITHIN_LAST_SIX_MONTHS: NO
DRESS: 0-->INDEPENDENT
TOILETING: 0-->INDEPENDENT
BATHING: 0-->INDEPENDENT
DRESS: 0-->INDEPENDENT
BATHING: 0-->INDEPENDENT
ADLS_ACUITY_SCORE: 15
ADLS_ACUITY_SCORE: 15
SWALLOWING: 0-->SWALLOWS FOODS/LIQUIDS WITHOUT DIFFICULTY
ADLS_ACUITY_SCORE: 16
ADLS_ACUITY_SCORE: 16
ADLS_ACUITY_SCORE: 15
ADLS_ACUITY_SCORE: 15
RETIRED_COMMUNICATION: 0-->UNDERSTANDS/COMMUNICATES WITHOUT DIFFICULTY
RETIRED_EATING: 0-->INDEPENDENT
ADLS_ACUITY_SCORE: 15
ADLS_ACUITY_SCORE: 15

## 2018-01-01 ASSESSMENT — PATIENT HEALTH QUESTIONNAIRE - PHQ9
5. POOR APPETITE OR OVEREATING: SEVERAL DAYS
SUM OF ALL RESPONSES TO PHQ QUESTIONS 1-9: 3

## 2018-01-01 ASSESSMENT — ANXIETY QUESTIONNAIRES
2. NOT BEING ABLE TO STOP OR CONTROL WORRYING: SEVERAL DAYS
6. BECOMING EASILY ANNOYED OR IRRITABLE: NOT AT ALL
IF YOU CHECKED OFF ANY PROBLEMS ON THIS QUESTIONNAIRE, HOW DIFFICULT HAVE THESE PROBLEMS MADE IT FOR YOU TO DO YOUR WORK, TAKE CARE OF THINGS AT HOME, OR GET ALONG WITH OTHER PEOPLE: NOT DIFFICULT AT ALL
GAD7 TOTAL SCORE: 4
1. FEELING NERVOUS, ANXIOUS, OR ON EDGE: SEVERAL DAYS
3. WORRYING TOO MUCH ABOUT DIFFERENT THINGS: SEVERAL DAYS
5. BEING SO RESTLESS THAT IT IS HARD TO SIT STILL: NOT AT ALL
GAD7 TOTAL SCORE: 4
7. FEELING AFRAID AS IF SOMETHING AWFUL MIGHT HAPPEN: NOT AT ALL

## 2018-01-01 ASSESSMENT — ENCOUNTER SYMPTOMS
NAUSEA: 0
SHORTNESS OF BREATH: 1
COUGH: 1
SHORTNESS OF BREATH: 1
HEADACHES: 0
COUGH: 0
SHORTNESS OF BREATH: 1
COUGH: 1
FEVER: 0
VOMITING: 0
FEVER: 0
DIARRHEA: 0
ABDOMINAL PAIN: 0
FEVER: 0
CHEST TIGHTNESS: 1

## 2018-05-14 NOTE — ED NOTES
[facility]  ED Nurse Handoff Report    ED Chief complaint: Shortness of Breath (pt has had increasing SOB since Friday, has needed increased oxygen at home; was at clinic today for anxiety and sent to ED after CT looked concerning)      ED Diagnosis:   Final diagnoses:   Acute respiratory distress   Pneumonia due to infectious organism, unspecified laterality, unspecified part of lung   Elevated troponin       Code Status: to be addressed upon admission    Allergies: No Known Allergies    Activity level - Baseline/Home:  Independent    Activity Level - Current:   Stand with Assist     Needed?: No    Isolation: No  Infection: Not Applicable    Bariatric?: No    Vital Signs:   Vitals:    05/14/18 1229 05/14/18 1230 05/14/18 1309 05/14/18 1330   BP:  162/69 179/81 174/68   Pulse:  97 98 89   Resp:    24   Temp:       TempSrc:       SpO2: 94%  91% 97%   Weight:       Height:           Cardiac Rhythm: ,        Pain level: 0-10 Pain Scale: 0    Is this patient confused?: No     Patient Report: Initial Complaint: Shortness of breath  Focused Assessment: Pt went into clinic today to get her anxiety med refilled. They noted pt was having difficulty breathing. Pt states she has been increasingly more SOB since Friday with congested cough and some thicker sputum coming up. Denies fever/chills. Lungs have expiratory wheezes upon arrival and crackles throughout with diminished lung sounds. Normally wears oxygen at 2.5L/min at home, has been having to increase this. Currently in ED is tolerating 3L with sats in low 90's. No peripheral edema. Had chest tightness over weekend but none today. Pt lives alone in senior living apartment. Has family in area.   Tests Performed: Labs, Chest xray, EKG  Abnormal Results: WBC 18.5, Hgb 10.3, Trop 0.071  Treatments provided: 500cc NS bolus, Solumedrol 125mg IVP, Albuterol neb, Rocephin 1 gram IVPB, Zithromax 500mg IVPB will be hung shortly    Family Comments: none  present, pt will be calling daughter    OBS brochure/video discussed/provided to patient: N/A    ED Medications:   Medications   cefTRIAXone (ROCEPHIN) 1 g vial to attach to  mL bag for ADULTS or NS 50 mL bag for PEDS (1 g Intravenous New Bag 5/14/18 1333)   azithromycin (ZITHROMAX) 500 mg in sodium chloride 0.9 % 250 mL intermittent infusion (not administered)   0.9% sodium chloride BOLUS (0 mLs Intravenous Stopped 5/14/18 1343)   methylPREDNISolone sodium succinate (solu-MEDROL) injection 125 mg (125 mg Intravenous Given 5/14/18 1331)   albuterol (PROVENTIL) neb solution 2.5 mg (2.5 mg Nebulization Given 5/14/18 1331)       Drips infusing?:  No    For the majority of the shift this patient was Green.   Interventions performed were none.    Severe Sepsis OR Septic Shock Diagnosis Present: No      ED NURSE PHONE NUMBER: [phone]

## 2018-05-14 NOTE — PROGRESS NOTES
RECEIVING UNIT ED HANDOFF REVIEW    ED Nurse Handoff Report was reviewed by: Rupal Pennington on May 14, 2018 at 2:19 PM

## 2018-05-14 NOTE — PHARMACY-ADMISSION MEDICATION HISTORY
Admission medication history interview status for the 5/14/2018  admission is complete. See EPIC admission navigator for prior to admission medications     Medication history source reliability:Moderate    Actions taken by pharmacist (provider contacted, etc): Changed APAP from 500mg [id number].  Changed Calcium and Vit D order to 1 tab daily.  Changed Mucinex D to Mucinex DM.  Removed Lorazepam as pt does not have a current Rx and it has been a long time since she used. Will need a new prescription.    Additional medication history information not noted on PTA med list : I called [facility] and [facility] and neither has info on Lorazepam for patient. I also checked the [facility] website.  The patient wanted her Lorazepam refilled as her Rx is over a year old and she has not had any for awhile.      Medication reconciliation/reorder completed by provider prior to medication history? Yes    Time spent in this activity: 30 minutes    Prior to Admission medications    Medication Sig Last Dose Taking? Auth Provider   ACETAMINOPHEN PO Take 1,000 mg by mouth every 8 hours as needed  5/14/2018 at am Yes Reported, Patient   albuterol (PROAIR HFA/PROVENTIL HFA/VENTOLIN HFA) 108 (90 Base) MCG/ACT Inhaler Inhale 2 puffs into the lungs every 6 hours as needed for shortness of breath / dyspnea or wheezing prn Yes Carlos Alberto Nelson MD BROVANA 15 MCG/2ML NEBU Take 15 mcg by nebulization 2 times daily  5/14/2018 at am Yes Reported, Patient   budesonide (PULMICORT) 0.5 MG/2ML nebulizer solution Take 0.5 mg by nebulization 2 times daily  5/14/2018 at am Yes Reported, Patient   calcium carbonate-vitamin D 600-200 MG-UNIT TABS Take 1 tablet by mouth daily  5/14/2018 at am Yes Mariah Worley NP   Cholecalciferol (VITAMIN D PO) Take 1 tablet by mouth daily Strength unknown 5/14/2018 at am Yes Reported, Patient   dextromethorphan-guaiFENesin (MUCINEX DM)  MG per 12 hr tablet Take 2 tablets by mouth 2 times daily as  needed for cough 5/14/2018 at am Yes Unknown, Entered By History

## 2018-05-14 NOTE — ED NOTES
Pt was sitting up talking on phone and oxygen levels decreased and heart rate increased. Pt encouraged to sit back and not be so active at this time. O2 increased to 4L/min again and sats eventually up to 93% with rest.

## 2018-05-14 NOTE — PROGRESS NOTES
SUBJECTIVE:   Kristine L Soulier is a 73 year old female who presents to clinic today for the following health issues:      Anxiety Follow-Up    Status since last visit: Improved     Other associated symptoms:feels body cannot relax     Complicating factors:   Significant life event: Yes-  Son is drug addict and going in and out of shelter and grandson is autisic    Current substance abuse: None  Depression symptoms: No  CLAYTON-7 SCORE 7/8/2015 7/11/2017 5/14/2018   Total Score 2 - -   Total Score - 1 4       CLAYTON-7  COPD Follow-Up    Symptoms are currently: slightly worsened-filling up     Current fatigue or dyspnea with ambulation: worsened from baseline    Shortness of breath: slightly worsened    Increased or change in Cough/Sputum: Yes-  Coughing and more sputum     Fever(s): No    Baseline ambulation without stopping to rest:  1 blocks. Able to walk up no flights of stairs without stopping to rest.    Any ER/UC or hospital admissions since your last visit? No     History   Smoking Status     Former Smoker     Packs/day: 0.25     Years: 20.00     Types: Cigarettes     Quit date: 1/29/2014   Smokeless Tobacco     Former User     No results found for: FEV1, LGS2PUR    Amount of exercise or physical activity: 2-3 days/week for an average of 15-30 minutes    Problems taking medications regularly: No    Medication side effects: none    Diet: regular (no restrictions)    On supplement oxygen to 2 units at home.    Today increased her oxygen to 3 L today as she has some short of breath and coughing. No fever. No recent steroid pills.   Using inhaler more frequently.   Started on Friday.     Son and grandson - both with mental health issues. Ongoing for years. Does not feel she needs any more help or we can do anything about it. Anxiety related to that.    Problem list and histories reviewed & adjusted, as indicated.  Additional history: as documented    Labs reviewed in EPIC    Reviewed and updated as needed this visit by  clinical staff       Reviewed and updated as needed this visit by Provider           Social History     Social History     Marital status:      Spouse name: N/A     Number of children: 5     Years of education: 12     Occupational History     retired       Self     Social History Main Topics     Smoking status: Former Smoker     Packs/day: 0.25     Years: 20.00     Types: Cigarettes     Quit date: 1/29/2014     Smokeless tobacco: Former User     Alcohol use No     Drug use: No     Sexual activity: Not Currently     Other Topics Concern     Parent/Sibling W/ Cabg, Mi Or Angioplasty Before 65f 55m? No     Social History Narrative    Balanced Diet - tries to    Osteoporosis Prevention Measures - Dairy servings per day: 3-4    Regular Exercise -  No Describe no    Dental Exam - YES - Date: 10/09    Eye Exam - YES - Date: about a year ago    Self Breast Exam - she tries to    Abuse: Current or Past (Physical, Sexual or Emotional)- No    Do you feel safe in your environment - Yes    Guns stored in the home - No    Sunscreen used - No    Seatbelts used - Yes    Lipids -  YES - Date: years ago, was on lipitor for awhile    Glucose -  NO    Colon Cancer Screening - No, has not had a colonoscopy done before and she does not want to have one done    Hemoccults - NO    Pap Test -  YES - Date: years ago    Do you have any concerns about STD's -  No    Mammography - YES - Date: years    DEXA - NO    Immunizations reviewed and up to date - last td given years ago    Rowena Parmar ma  2009                 No Known Allergies  Patient Active Problem List   Diagnosis     CARDIOVASCULAR SCREENING; LDL GOAL LESS THAN 130     Seasonal allergic rhinitis     Advanced directives, counseling/discussion     Enlarged lymph nodes     COPD (chronic obstructive pulmonary disease) (H)     Community acquired pneumonia     Health Care Home     Anxiety     HTN, goal below 140/80     Hilar adenopathy     Abdominal pain, generalized     RA  "(rheumatoid arthritis) (H)     Reviewed medications, social history and  past medical and surgical history.    Review of system: for general, respiratory, CVS, GI and psychiatry negative except for noted above.     EXAM:  /70 (Cuff Size: Adult Regular)  Pulse 112  Temp 98.3  F (36.8  C) (Oral)  Resp 20  Ht 5' 3\" (1.6 m)  Wt 105 lb 8 oz (47.9 kg)  SpO2 90%  BMI 18.69 kg/m2  Constitutional: healthy, alert and no distress   Psychiatric: mentation appears normal and affect normal/bright  Cardiovascular: RRR. No murmurs,  Respiratory: Mild tachypnea present.  Coarse breath sounds with mild expiratory wheezing present.  Using nasal cannula for oxygen.     ASSESSMENT / PLAN:  (J44.1) Chronic obstructive pulmonary disease with acute exacerbation (H)  (primary encounter diagnosis)  Comment: She is needing to use her supplemental home oxygen which is around 1.5 L to 2 L but now needing 3L and on 3L she is still setting around 89-90 and c/o short of breath.  We obtained a chest x-ray for which shows bilateral interstitial infiltrate.  I called radiologist to confirm it and he agreed with it too.  Her last x-ray was 3 years ago but much better.  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 Patient came in [facility] mobility and they will not transfer her to ER. I have to call an ambulance - paramedics arrived. She wishes to go to [facility]. Called [facility] ER.   Plan: COPD ACTION PLAN, XR Chest 2 Views             (R93.8) Abnormal chest x-ray  Comment:    Plan:  See above.     "

## 2018-05-14 NOTE — ED NOTES
Bed: ED26  Expected date: 5/14/18  Expected time: 11:46 AM  Means of arrival: Ambulance  Comments:  [facility] [phone] y/o SOB

## 2018-05-14 NOTE — H&P
Admitted:     05/14/2018      PRIMARY CARE PHYSICIAN:  Mayra Ferreira NP.      CODE STATUS:  Do not resuscitate.      CHIEF COMPLAINT:  Shortness of breath.      HISTORY OF PRESENT ILLNESS:  Mrs. Soulier is a pleasant 73-year-old female.  She has a longstanding history of COPD secondary to a longstanding history of tobacco use.  She has had a pneumothorax in the past, but has been stable for several years.  She does come down with pneumonia periodically but has been relatively well until the past 2-3 days noticing a little bit more shortness of breath.  She went to her primary care clinic today for anxiety issues, but they took a look at her and they thought her COPD was probably flaring up.  Also noting that she was hypoxic on her baseline level of oxygen at home, she uses about 2 to 2.5 liters of nasal cannula O2, and she turned it up gradually to 3-4 on room air.  She drops down into the high 60% range on her home level.  She is about 80%-85%.  She does not get up to 89-90 until you get her at about 5 or 6 liters.  Air movement is not good in the lungs.  She sounds very congested.  Chest x-ray shows bilateral infiltrates.  She has a leukocytosis of 18.5.  BNP looks normal.  Mild elevation of troponin, but nothing suggestive of ischemia on EKG.  With her leukocytosis and x-ray findings, we think she has a community-acquired pneumonia with a probable COPD exacerbation in addition to this.  She needs to come in to an inpatient bed for management of both conditions.  Patient's mood was stable when I went to see her.  She is not complaining of any chest pain.  She does note increased shortness of breath above baseline.  She says that she has had a productive cough.  Her cough is chronic, but it has been more productive as of late and thicker and she says looks kind of yellow.  Does not complain of any fevers or chills at home.  She lives in a intermediate community, but is independent and she says there have been people  in that community that have had some respiratory illnesses lately.      ALLERGIES:  NO KNOWN DRUG ALLERGIES.       HOME MEDICATIONS:   1.  Acetaminophen 500 mg q.4 p.r.n.   2.  Albuterol inhaler 2 puffs q.6 p.r.n.   3.  Brovana inhaler 50 mcg by neb 2 times daily.   4.  Budesonide nebulizer 0.5 mg 2 times a day.   5.  Calcium with vitamin D two tablets daily.   6.  Cholecalciferol 2 tablets daily.   7.  Lorazepam, dose unknown.   8.  Mucinex 600 mg by mouth b.i.d.      PAST MEDICAL HISTORY:   1.  COPD.   2.  Hypertension.   3.  Osteoarthritis.   4.  Anxiety.   5.  Allergic rhinitis.      PAST SURGICAL HISTORY:  She has had a flexible and rigid bronchoscopy done in 2014.      FAMILY HISTORY:  Mother  from emphysema.  She also suffered from diabetes.  Father  from alcohol problems.      SOCIAL HISTORY:  Smoked 1/4 to 1/2 pack a day for 20 years, quit in .  She denies any alcohol or illicit drug history.  She is retired.  She was self-employed for a while.  She is , lives in a senior halfway community but lives independently in that community.      REVIEW OF SYSTEMS:  A 10-system review conducted with patient and other than those systems listed in history of present illness are negative.      PHYSICAL EXAMINATION:   VITAL SIGNS:  Blood pressure 136/70, respiratory rate 24, heart rate is 112, temperature is 99.1 Fahrenheit taken orally, O2 sat 69% on room air, 85% on 2 liters nasal cannula, 97% on 6 liters nasal cannula.   GENERAL:  She is thin, appears to be a little underweight, elderly-appearing female.  She is in no apparent distress, pleasant and cooperative, afebrile.  Alert and oriented x 4.   HEENT:  Normocephalic, atraumatic.  No oropharyngeal erythema.   NECK:  No cervical lymphadenopathy, no thyromegaly.   RESPIRATORY:  She has very poor air movement throughout her lungs from both posterior and anterior aspects.  I do not appreciate any wheezing at the bases.  Air movement improves  slightly to the apices, and I can hear some inspiratory and expiratory wheezes in the upper quadrants bilaterally.  She has no coarse sounds.  She is using platysma but not diaphragm, and she is a little tachypneic at times.   CARDIOVASCULAR:  Tachycardic but in sinus.  Normal S1, S2; no S3 or S4.  No murmurs, rubs or gallops.  She has 2+ pulses palpable in all 4 extremities.   ABDOMEN:  Normal bowel sounds and is soft, nontender, nondistended.  No hepatosplenomegaly or mass palpable.   EXTREMITIES:  No clubbing, cyanosis or edema.   NEUROLOGIC:  Cranial nerves II through XII intact.  She has 5/5 strength in all 4 extremities, sensation intact diffusely, normal coordination by bilateral finger-nose test.      LABORATORY DATA:  On basic metabolic profile, bicarbonate is low up at 34, but all other values are normal.  BNP is normal at 755.  Lactic acid normal at 1.8.  Troponin is detectable at 0.071.  CBC:  White count is elevated at 18.5, ANC is up at 15.1, hemoglobin is down to 10.3, all other values are normal.      PENDING LABORATORIES:  Blood culture x 2.      IMAGING STUDIES:  Chest x-ray impression actually states increased interstitial and airspace opacities throughout both lungs.  Edema versus atypical infection are most likely; emphysematous changes are also again seen.  No pleural effusion or pneumothorax.      EKG shows signs of an old anteroseptal infarct but nothing acute.      ASSESSMENT:  This is a 73-year-old female with history of anxiety, chronic obstructive pulmonary disease, hypertension, osteoarthritis who has had 2-3 days of worsening shortness of breath and productive cough.  Physical exam and workup here reveal signs of probable pneumonia, less likely a volume overload from something like CHF given normal BNP and no signs of volume overload on physical exam.  She has elevated white count, signs of atypical pneumonia on chest x-ray and decreased air movement throughout.  She probably has some  bronchoconstriction secondary to this given her chronic  obstructive pulmonary disease history.  She is being admitted for treatment of community-acquired pneumonia and chronic obstructive pulmonary disease exacerbation.      PLAN:   1.  Community-acquired pneumonia.  Probably picked this up from somebody in her [facility] community.  She was started on IV ceftriaxone and azithromycin in the Emergency Department, and I will continue these for her while she is here.  We will keep her on IV antibiotics until her leukocytosis improves and she reports having her influenza vaccine.  This does not really present like influenza.  I am less worried about that.  We will treat COPD as below.   2.  Chronic obstructive pulmonary disease, likely an exacerbation.  Air movement is very poor.  I do hear some wheezing at the apices.  Put her on scheduled DuoNebs q.4 hours, q.2 hours Albuterol nebs p.r.n., IV Solu-Medrol once daily dosing at 16.5 mg and a respiratory therapy consult.  Pulmonary toileting.  Will work to titrate down to her baseline of 2 to 2.5 liters.  Currently, she is on 5 liters.   3.  Hypertension, a little bit high today.  She says that she has been having some problems with anxiety today.  Does not really have any medications listed for this condition.  I will order some hydralazine to be used in case of any spikes of systolic blood pressure above 180.   4.  Elevated troponin, likely type 2 Non-ST-elevation myocardial infarction secondary to demand ischemia.  I do not see any evidence of acute ischemia.  I will keep her on cardiac telemetry and will get another troponin in about 6 hours.  We will follow this, but I do not think it is acute coronary syndrome.   5.  Deep venous thrombosis prophylaxis:  PCDs and encourage ambulation.  If she is here for prolonged stay or has decreased activity, would consider starting subcutaneous heparin.   6.  Code status:  The patient endorses do not resuscitate.       DISPOSITION:  Would anticipate about 2-3 stay to wean down home O2 levels and stabilize her pneumonia.         JOEL VICENTE MD             D: 2018   T: 2018   MT: WILDA      Name:     SOULIER, KRISTINE   MRN:      [id number]        Account:      [id number]   :      1945        Admitted:     2018                   Document: [id number]       cc: CELESTINE HEAD NP

## 2018-05-14 NOTE — IP AVS SNAPSHOT
[facility]    [address] [place] [address]    Phone:  [phone]                                       After Visit Summary   5/14/2018    Kristine L Soulier    MRN: [id number]           After Visit Summary Signature Page     I have received my discharge instructions, and my questions have been answered. I have discussed any challenges I see with this plan with the nurse or doctor.    ..........................................................................................................................................  Patient/Patient Representative Signature      ..........................................................................................................................................  Patient Representative Print Name and Relationship to Patient    ..................................................               ................................................  Date                                            Time    ..........................................................................................................................................  Reviewed by Signature/Title    ...................................................              ..............................................  Date                                                            Time

## 2018-05-14 NOTE — ED PROVIDER NOTES
History     Chief Complaint:  Shortness of Breath     HPI   Kristine L Soulier is a 73 year old female with a history of pneumothorax, COPD, and pneumonia who presents to the emergency department via EMS for evaluation of shortness of breath after she was in clinic in [place]. The patient had set up an appointment at the [facility] to refill her prescription of anxiety medication but was sent here for shortness of breath. She knew that she was having issues with her breathing because over the weekend she developed increase pressure in the left aspect of her chest, a worse than usual cough, and increased O2 requirements with exertion. The patient is usually on 2-2.5 lpm of O2 but had to increase it to 3 this morning. She notes that this does not feel like pneumonia or pneumothorax and she has never had a thoracentesis. She denies chest pain, fever and is not currently on a diuretic.  She does not currently smoke.      Allergies:  No known Drug Allergies      Medications:    ACETAMINOPHEN PO  albuterol (PROAIR HFA/PROVENTIL HFA/VENTOLIN HFA) 108 (90 Base) MCG/ACT Inhaler  BROVANA 15 MCG/2ML NEBU  budesonide (PULMICORT) 0.5 MG/2ML nebulizer solution  calcium carbonate-vitamin D 600-200 MG-UNIT TABS  Cholecalciferol (VITAMIN D PO)  Pseudoephedrine-Guaifenesin (MUCINEX D PO)    Past Medical History:    Allergic rhinitis  Anxiety  Arthritis  COPD  Former smoker  Hypertension    Past Surgical History:    Bronchoscopy flexible  Bronchoscopy flexible and rigid X2     Family History:    Mother- asthma, diabetes, arthritis, blood clots, kidney problems, emphysema  Father- alcohol/drug abuse  Maternal aunt- arthritis    Social History:  Marital Status:   [4]  Social History   Substance Use Topics     Smoking status: Former Smoker     Packs/day: 0.25     Years: 20.00     Types: Cigarettes     Quit date: 1/29/2014     Smokeless tobacco: Former User     Alcohol use No        Review of Systems   Constitutional:  "Negative for fever.   Respiratory: Positive for cough, chest tightness (pressure) and shortness of breath.    Cardiovascular: Negative for chest pain.   All other systems reviewed and are negative.        Physical Exam     Patient Vitals for the past 24 hrs:   BP Temp Temp src Pulse Heart Rate Resp SpO2 Height Weight   05/14/18 1330 174/68 - - 89 - 24 97 % - -   05/14/18 1309 179/81 - - 98 - - 91 % - -   05/14/18 1230 162/69 - - 97 - - - - -   05/14/18 1229 - - - - - - 94 % - -   05/14/18 1220 164/71 - - 99 - 28 92 % - -   05/14/18 1210 - - - - - - 91 % - -   05/14/18 1207 (!) 188/93 99.1  F (37.3  C) Oral - 109 30 (!) 69 % 1.6 m (5' 3\") 47.9 kg (105 lb 8 oz)          Physical Exam  Physical Exam   General:  Sitting on bed, comfortable appearing, but slight increased work of breathing.   HENT:  No obvious trauma to head  Right Ear:  External ear normal.   Left Ear:  External ear normal.   Nose:  Nose normal.   Eyes:  Conjunctivae and EOM are normal. Pupils are equal, round, and reactive.   Neck: Normal range of motion. Neck supple. No tracheal deviation present.   CV:  Normal heart sounds. No murmur heard.  Pulm/Chest: Slight increase work of breathing. Slightly coarse breath sounds and scattered wheezes throughout.  Abd: Soft. No distension. There is no tenderness. There is no rigidity, no rebound and no guarding.   M/S: Normal range of motion.   Neuro: Alert. GCS 15.  Skin: Skin is warm and dry. No rash noted. Not diaphoretic.   Psych: Normal mood and affect. Behavior is normal.     Emergency Department Course     ECG:  ECG taken at 1207, ECG read at 1208  Sinus tachycardia  Possible left atrial enlargement  Anteroseptal infarct, age undetermined  Abnormal ECG  Rate 104 bpm. TN interval 164 ms. QRS duration 82 ms. QT/QTc 318/418 ms. P-R-T axes 84 87 52.    Imaging:  Radiology findings were communicated with the patient who voiced understanding of the findings.    XR Chest 2 Views  Final Result  IMPRESSION: Diffuse " reticular nodular bilateral lung opacities are  again seen, not significantly changed from prior examination. No  pleural effusions or pneumothorax. Stable heart and mediastinum. The  lungs are hyperexpanded as before.  EDGARD GARCÍA MD  Reading per radiology     Laboratory:  Laboratory findings were communicated with the patient who voiced understanding of the findings.    Lactic acid: 1.8  BNP: 755  Troponin (Collected 1220): 0.071 (H)  Blood culture: pending X2   CBC: WBC 18.5 (H), HGB 10.3 (L),   BMP: glucose 120 (H), Cl 92 (H), CO2 34 (H), o/w WNL (Creatinine 0.43 (L))    Interventions:  1333 Rocephin 1g IV  1331 Solumedrol 125 mg IV  1331 Proventil 2.5 mg Neb  1225 NS 1L IV Bolus   Azithromycin 500 mg IV    Emergency Department Course:  Nursing notes and vitals reviewed.  I performed an exam of the patient as documented above.   I discussed the treatment plan with the patient. They expressed understanding of this plan and consented to admission. I discussed the patient with Dr. Fritz, who will admit the patient to a monitored bed for further evaluation and treatment.      I personally reviewed the imaging and labs results with the patient and answered all related questions prior to admission.  Impression & Plan      Medical Decision Making:  Kristine L Soulier is a very pleasant 73 year old year old patient who presents to the emergency department with concern of shortness of breath.  The patient has COPD.  She is normally on 2-2.5 L of oxygen, but recently she has increased her oxygen to 3 L.  She went to her clinic today because of anxiety and wanting a refill on her anxiety medicine.  She also reported to them that she has had increased shortness of breath.  The patient was found to be hypoxic.  Chest x-ray is concerning for pneumonia.  She was sent in here by EMS.  She received a nebulized treatment en route from the paramedics.  The patient felt better after this.  Her pulse oximetry on room air  was 69%.  Even on 2 L, her baseline, her pulse oximetry was in the low/mid 80s.  A chest x-ray was repeated here and shows evidence of pneumonia.  It is atypical.  I considered pulmonary edema, but the patient is not on a diuretic, has never been on a diuretic and her BNP is normal.  The patient does have a low-grade fever and leukocytosis.  I suspect that this is more infectious in etiology.  The patient was provided Rocephin and azithromycin.  She was given additional nebulized treatment and steroid.  Due to the patient's respiratory distress and increased oxygen need, she will be admitted to the hospital for continued evaluation and treatment.  I spoke to the hospitalist Dr. Conor Fritz who is agreed to admit the patient for continued evaluation and treatment.    Diagnosis:    ICD-10-CM    1. Acute respiratory distress R06.03 Blood culture   2. Pneumonia due to infectious organism, unspecified laterality, unspecified part of lung J18.9    3. Elevated troponin R74.8      Disposition:   Discharged     Scribe Disclosure:  LIZET, Hayden Moncada, am serving as a scribe at 12:14 PM on 5/14/2018 to document services personally performed by Jimmy Newberry DO, based on my observations and the provider's statements to me.      EMERGENCY DEPARTMENT       Jimmy Newberry DO  05/14/18 3792

## 2018-05-14 NOTE — NURSING NOTE
Ambulance was called for pt for copd exacerbation.  Pt was on 3 L NC and sats were 88-90%.  Pt has productive cough.  No nebs given in clinic.    Pt was worried about her metro mobility ride.  I talked to her MM  when he arrived for the 1115 pu.  Pt was leaving in the ambulance. He will inform MM of this change.  LOLIS [place]

## 2018-05-14 NOTE — MR AVS SNAPSHOT
"              After Visit Summary   2018    Kristine L Soulier    MRN: [id number]           Patient Information     Date Of Birth          1945        Visit Information        Provider Department      2018 10:00 AM Carlos Alberto Nelson MD [facility] [place]        Today's Diagnoses     Chronic obstructive pulmonary disease with acute exacerbation (H)    -  1    Abnormal chest x-ray           Follow-ups after your visit        Who to contact     If you have questions or need follow up information about today's clinic visit or your schedule please contact [facility] directly at [phone].  Normal or non-critical lab and imaging results will be communicated to you by MD.Voicehart, letter or phone within 4 business days after the clinic has received the results. If you do not hear from us within 7 days, please contact the clinic through MD.Voicehart or phone. If you have a critical or abnormal lab result, we will notify you by phone as soon as possible.  Submit refill requests through Cleo or call your pharmacy and they will forward the refill request to us. Please allow 3 business days for your refill to be completed.          Additional Information About Your Visit        MyChart Information     Cleo lets you send messages to your doctor, view your test results, renew your prescriptions, schedule appointments and more. To sign up, go to [email or web address] . Click on \"Log in\" on the left side of the screen, which will take you to the Welcome page. Then click on \"Sign up Now\" on the right side of the page.     You will be asked to enter the access code listed below, as well as some personal information. Please follow the directions to create your username and password.     Your access code is: [email or web address]  Expires: 2018 11:20 AM     Your access code will  in 90 days. If you need help or a new code, please call your [facility] or [phone].        Care " "EveryWhere ID     This is your Care EveryWhere ID. This could be used by other organizations to access your [place] medical records  [id number]        Your Vitals Were     Pulse Temperature Respirations Height Pulse Oximetry BMI (Body Mass Index)    112 98.3  F (36.8  C) (Oral) 20 5' 3\" (1.6 m) 90% 18.69 kg/m2       Blood Pressure from Last 3 Encounters:   05/14/18 136/70   11/03/17 144/76   07/11/17 132/68    Weight from Last 3 Encounters:   05/14/18 105 lb 8 oz (47.9 kg)   11/03/17 105 lb (47.6 kg)   07/11/17 110 lb 4 oz (50 kg)              We Performed the Following     COPD ACTION PLAN          Today's Medication Changes          These changes are accurate as of 5/14/18 11:21 AM.  If you have any questions, ask your nurse or doctor.               Stop taking these medicines if you haven't already. Please contact your care team if you have questions.     ARAVA 10 MG tablet   Generic drug:  leflunomide   Stopped by:  Carlos Alberto Nelson MD           chlorthalidone 25 MG tablet   Commonly known as:  HYGROTON   Stopped by:  Carlos Alberto Nelson MD           cyclobenzaprine 5 MG tablet   Commonly known as:  FLEXERIL   Stopped by:  Carlos Alberto Nelson MD           ferrous gluconate 324 (38 Fe) MG tablet   Commonly known as:  FERGON   Stopped by:  Carlos Alberto Nelson MD           ketoprofen 10% in PLO 10% topical gel   Stopped by:  Carlos Alberto Nelson MD           LORazepam 0.5 MG tablet   Commonly known as:  ATIVAN   Stopped by:  Carlos Alberto Nelson MD           tiotropium 18 MCG capsule   Commonly known as:  SPIRIVA HANDIHALER   Stopped by:  Carlos Alberto Nelson MD                    Primary Care Provider Office Phone # Fax #    Mayra PERRY Perdomo [facility] [phone] [address]  [facility] [address]        Goals        General    Medication 1 (pt-stated)     Notes - Note created  9/11/2014 11:39 AM by Clarice Stark RN    I will get Flu shot in " October 2014 .    As of today's date 9/11/2014 goal is met at 0 - 25%.   Goal Status:  Active        Equal Access to Services     SEBASTIÁNBLOSSOM JALEEL : Hadgianluca simi lou mary anne Garcia, maisha aguirre, diane crwoe, yamel irby. Brenda Redwood -[phone].    ATENCIÓN: Si habla español, tiene a grover disposición servicios gratuitos de asistencia lingüística. Llame al [phone].    We comply with applicable federal civil rights laws and [place] laws. We do not discriminate on the basis of race, color, national origin, age, disability, sex, sexual orientation, or gender identity.            Thank you!     Thank you for choosing [facility]  for your care. Our goal is always to provide you with excellent care. Hearing back from our patients is one way we can continue to improve our services. Please take a few minutes to complete the written survey that you may receive in the mail after your visit with us. Thank you!             Your Updated Medication List - Protect others around you: Learn how to safely use, store and throw away your medicines at www.disposemymeds.org.          This list is accurate as of 5/14/18 11:21 AM.  Always use your most recent med list.                   Brand Name Dispense Instructions for use Diagnosis    ACETAMINOPHEN PO           albuterol 108 (90 Base) MCG/ACT Inhaler    PROAIR HFA/PROVENTIL HFA/VENTOLIN HFA     Inhale 2 puffs into the lungs every 6 hours as needed for shortness of breath / dyspnea or wheezing        BROVANA 15 MCG/2ML Nebu neb solution   Generic drug:  arformoterol      Take 15 mcg by nebulization 2 times daily        budesonide 0.5 MG/2ML neb solution    PULMICORT     Take 0.5 mg by nebulization 2 times daily        calcium carbonate-vitamin D 600-200 MG-UNIT Tabs      Take 2 tablets by mouth daily        MUCINEX D PO           * order for DME     1 Device    Equipment being ordered: Nebulizer machine and supplies  [facility]  [facility]  [phone] Fax: [phone]    SOB (shortness of breath), COPD exacerbation (H)       * order for DME     1 Device    Equipment being ordered: Oxygen - portable O2 tank.  3 Liters per minute.    COPD (chronic obstructive pulmonary disease) (H), Community acquired pneumonia       * order for DME     1 Device    Equipment being ordered: Oxygen portable, O2 delivered at 3L and may wean O2 to 2.5 L as tolerated    COPD (chronic obstructive pulmonary disease) (H), Pneumonia       VITAMIN D PO      Take 2 tablets by mouth daily Strength unknown        * Notice:  This list has 3 medication(s) that are the same as other medications prescribed for you. Read the directions carefully, and ask your doctor or other care provider to review them with you.

## 2018-05-14 NOTE — PROGRESS NOTES
Patient arrived to floor and settled into bed. Oriented to floor with questions answered. Admission done.

## 2018-05-14 NOTE — IP AVS SNAPSHOT
MRN:[id number]                      After Visit Summary   5/14/2018    Kristine L Soulier    MRN: [id number]           Thank you!     Thank you for choosing [facility] for your care. Our goal is always to provide you with excellent care. Hearing back from our patients is one way we can continue to improve our services. Please take a few minutes to complete the written survey that you may receive in the mail after you visit with us. Thank you!        Patient Information     Date Of Birth          1945        Designated Caregiver       Most Recent Value    Caregiver    Will someone help with your care after discharge? yes    Name of designated caregiver Denise Soulier    Phone number of caregiver [phone]    Caregiver address [address] [facility] [address]      About your hospital stay     You were admitted on:  May 14, 2018 You last received care in the:  [place] [address] [facility]    You were discharged on:  May 17, 2018       Who to Call     For medical emergencies, please call 911.  For non-urgent questions about your medical care, please call your primary care provider or clinic, [phone]          Attending Provider     Provider Specialty    Jimmy Newberry DO Emergency Medicine    Conor Fritz MD Internal Medicine       Primary Care Provider Office Phone # Fax #    PERRY Flores [facility] [phone]      After Care Instructions     Activity       Your activity upon discharge: activity as tolerated            Diet       Follow this diet upon discharge: regular                  Follow-up Appointments     Follow-up and recommended labs and tests        Follow up with primary care provider, Mayra Ferreira, within 7 days for hospital follow- up.  No follow up labs or test are needed.                  Additional Services     Pulmonary Rehab Referral       *This therapy referral will be filtered to a  "centralized scheduling office at [facility] and the patient will receive a call to schedule an appointment at a [facility] location most convenient for them.*     If you have not heard from the scheduling office within 2 business days, please call [phone] for all locations.    Please be aware that coverage of these services is subject to the terms and limitations of your health insurance plan.  Call member services at your health plan with any benefit or coverage questions.      **Note to Provider:  If you are referring outside of [facility] for the therapy appointment, please list the name of the location in the \"special instructions\" above, print the referral and give to the patient to schedule the appointment.                     Pending Results     Date and Time Order Name Status Description    5/14/2018 1211 Blood culture Preliminary     5/14/2018 1211 Blood culture Preliminary             Statement of Approval     Ordered          05/17/18 1216  I have reviewed and agree with all the recommendations and orders detailed in this document.  EFFECTIVE NOW     Approved and electronically signed by:  Mayela Louie MD             Admission Information     Date & Time Provider Department Dept. Phone    5/14/2018 Conor Fritz MD [place] [address] [facility] [phone]      Your Vitals Were     Blood Pressure Pulse Temperature Respirations Height Weight    157/73 (BP Location: Right arm) 86 98.8  F (37.1  C) (Oral) 16 1.6 m (5' 3\") 47.9 kg (105 lb 9.6 oz)    Pulse Oximetry BMI (Body Mass Index)                97% 18.71 kg/m2          MyChart Information     [employer] lets you send messages to your doctor, view your test results, renew your prescriptions, schedule appointments and more. To sign up, go to [email or web address] . Click on \"Log in\" on the left side of the screen, which will take you to the Welcome page. Then click on \"Sign up Now\" on the right side of the " page.     You will be asked to enter the access code listed below, as well as some personal information. Please follow the directions to create your username and password.     Your access code is: [email or web address]  Expires: 2018 11:20 AM     Your access code will  in 90 days. If you need help or a new code, please call your [place] clinic or [phone].        Care EveryWhere ID     This is your Care EveryWhere ID. This could be used by other organizations to access your [place] medical records  [id number]        Equal Access to Services     [facility]: Hadgianluca Garcia, maisha aguirre, diane crowe, yamel paul . So [facility] [phone].    ATENCIÓN: Si habla español, tiene a grover disposición servicios gratuitos de asistencia lingüística. Llame al [phone].    We comply with applicable federal civil rights laws and [place] laws. We do not discriminate on the basis of race, color, national origin, age, disability, sex, sexual orientation, or gender identity.               Review of your medicines      START taking        Dose / Directions    azithromycin 250 MG tablet   Commonly known as:  ZITHROMAX   Used for:  Community acquired pneumonia, unspecified laterality        Dose:  250 mg   Take 1 tablet (250 mg) by mouth daily for 2 days   Quantity:  2 tablet   Refills:  0       cefuroxime 500 MG tablet   Commonly known as:  CEFTIN   Indication:  Community Acquired Pneumonia   Used for:  Community acquired pneumonia, unspecified laterality        Dose:  500 mg   Take 1 tablet (500 mg) by mouth every 12 hours for 6 days   Quantity:  12 tablet   Refills:  0       ipratropium - albuterol 0.5 mg/2.5 mg/3 mL 0.5-2.5 (3) MG/3ML neb solution   Commonly known as:  DUONEB   Used for:  Pulmonary emphysema, unspecified emphysema type (H)        Dose:  3 mL   Take 1 vial (3 mLs) by nebulization every 6 hours   Quantity:  360 mL   Refills:  0       predniSONE  10 MG tablet   Commonly known as:  DELTASONE   Used for:  Pulmonary emphysema, unspecified emphysema type (H)   Notes to Patient:  6 tabs on 5/18, 5/19, 5/20  4 tabs on 5/21, 5/22, 5/23  3 tabs on 5/24, 5/25, 5/26  2 tabs on 5/27, 5/28, 5/29  1 tab on 5/30, 5/31, 6/1        6 tabs daily for 3days  Then 4 tabs daily for 3 days, then 3 tabs daily for 3 days, then 2 tabs daily for 3 days, then 1 tab daily for 3 days, then stop   Quantity:  60 tablet   Refills:  0         CONTINUE these medicines which may have CHANGED, or have new prescriptions. If we are uncertain of the size of tablets/capsules you have at home, strength may be listed as something that might have changed.        Dose / Directions    * order for DME   This may have changed:  Another medication with the same name was removed. Continue taking this medication, and follow the directions you see here.   Used for:  SOB (shortness of breath), COPD exacerbation (H)        Equipment being ordered: Nebulizer machine and supplies  [facility]  [phone] Fax: [phone]   Quantity:  1 Device   Refills:  0       * order for DME   This may have changed:  Another medication with the same name was removed. Continue taking this medication, and follow the directions you see here.   Used for:  COPD (chronic obstructive pulmonary disease) (H), Pneumonia        Equipment being ordered: Oxygen portable, O2 delivered at 3L and may wean O2 to 2.5 L as tolerated   Quantity:  1 Device   Refills:  0       * Notice:  This list has 2 medication(s) that are the same as other medications prescribed for you. Read the directions carefully, and ask your doctor or other care provider to review them with you.      CONTINUE these medicines which have NOT CHANGED        Dose / Directions    ACETAMINOPHEN PO        Dose:  1000 mg   Take 1,000 mg by mouth every 8 hours as needed   Refills:  0       albuterol 108 (90 Base) MCG/ACT Inhaler   Commonly known as:  PROAIR HFA/PROVENTIL  HFA/VENTOLIN HFA        Dose:  2 puff   Inhale 2 puffs into the lungs every 6 hours as needed for shortness of breath / dyspnea or wheezing   Refills:  0       BROVANA 15 MCG/2ML Nebu neb solution   Generic drug:  arformoterol        Dose:  15 mcg   Take 15 mcg by nebulization 2 times daily   Refills:  0       budesonide 0.5 MG/2ML neb solution   Commonly known as:  PULMICORT        Dose:  0.5 mg   Take 0.5 mg by nebulization 2 times daily   Refills:  0       calcium carbonate-vitamin D 600-200 MG-UNIT Tabs        Dose:  1 tablet   Take 1 tablet by mouth daily   Refills:  0       dextromethorphan-guaiFENesin  MG per 12 hr tablet   Commonly known as:  MUCINEX DM        Dose:  2 tablet   Take 2 tablets by mouth 2 times daily as needed for cough   Refills:  0       VITAMIN D PO        Dose:  1 tablet   Take 1 tablet by mouth daily Strength unknown   Refills:  0            Where to get your medicines      These medications were sent to [facility] - [address]  [address] [facility] [address], [place] [address]     Phone:  [phone]     azithromycin 250 MG tablet    cefuroxime 500 MG tablet    ipratropium - albuterol 0.5 mg/2.5 mg/3 mL 0.5-2.5 (3) MG/3ML neb solution         Some of these will need a paper prescription and others can be bought over the counter. Ask your nurse if you have questions.     Bring a paper prescription for each of these medications     predniSONE 10 MG tablet                Protect others around you: Learn how to safely use, store and throw away your medicines at www.disposemymeds.org.        ANTIBIOTIC INSTRUCTION     You've Been Prescribed an Antibiotic - Now What?  Your healthcare team thinks that you or your loved one might have an infection. Some infections can be treated with antibiotics, which are powerful, life-saving drugs. Like all medications, antibiotics have side effects and should only be used when necessary. There are some important things you  should know about your antibiotic treatment.      Your healthcare team may run tests before you start taking an antibiotic.    Your team may take samples (e.g., from your blood, urine or other areas) to run tests to look for bacteria. These test can be important to determine if you need an antibiotic at all and, if you do, which antibiotic will work best.      Within a few days, your healthcare team might change or even stop your antibiotic.    Your team may start you on an antibiotic while they are working to find out what is making you sick.    Your team might change your antibiotic because test results show that a different antibiotic would be better to treat your infection.    In some cases, once your team has more information, they learn that you do not need an antibiotic at all. They may find out that you don't have an infection, or that the antibiotic you're taking won't work against your infection. For example, an infection caused by a virus can't be treated with antibiotics. Staying on an antibiotic when you don't need it is more likely to be harmful than helpful.      You may experience side effects from your antibiotic.    Like all medications, antibiotics have side effects. Some of these can be serious.    Let you healthcare team know if you have any known allergies when you are admitted to the hospital.    One significant side effect of nearly all antibiotics is the risk of severe and sometimes deadly diarrhea caused by Clostridium difficile (C. Difficile). This occurs when a person takes antibiotics because some good germs are destroyed. Antibiotic use allows C. diificile to take over, putting patients at high risk for this serious infection.    As a patient or caregiver, it is important to understand your or your loved one's antibiotic treatment. It is especially important for caregivers to speak up when patients can't speak for themselves. Here are some important questions to ask your healthcare  team.    What infection is this antibiotic treating and how do you know I have that infection?    What side effects might occur from this antibiotic?    How long will I need to take this antibiotic?    Is it safe to take this antibiotic with other medications or supplements (e.g., vitamins) that I am taking?     Are there any special directions I need to know about taking this antibiotic? For example, should I take it with food?    How will I be monitored to know whether my infection is responding to the antibiotic?    What tests may help to make sure the right antibiotic is prescribed for me?      Information provided by:  www.cdc.gov/getsmart  U.S. Department of Health and Human Services  Centers for disease Control and Prevention  National Center for Emerging and Zoonotic Infectious Diseases  Division of Healthcare Quality Promotion             Medication List: This is a list of all your medications and when to take them. Check marks below indicate your daily home schedule. Keep this list as a reference.      Medications           Morning Afternoon Evening Bedtime As Needed    ACETAMINOPHEN PO   Take 1,000 mg by mouth every 8 hours as needed   Next Dose Due:  Resume home schedule                                    albuterol 108 (90 Base) MCG/ACT Inhaler   Commonly known as:  PROAIR HFA/PROVENTIL HFA/VENTOLIN HFA   Inhale 2 puffs into the lungs every 6 hours as needed for shortness of breath / dyspnea or wheezing   Next Dose Due:  Resume home schedule                                   azithromycin 250 MG tablet   Commonly known as:  ZITHROMAX   Take 1 tablet (250 mg) by mouth daily for 2 days   Next Dose Due:  At 2 pm tomorrow (5/18/18)                                    BROVANA 15 MCG/2ML Nebu neb solution   Take 15 mcg by nebulization 2 times daily   Last time this was given:  30 mcg on 5/17/2018  7:46 AM   Generic drug:  arformoterol   Next Dose Due:  At 7 pm this evening (5/17/18)                                        budesonide 0.5 MG/2ML neb solution   Commonly known as:  PULMICORT   Take 0.5 mg by nebulization 2 times daily   Last time this was given:  0.5 mg on 5/17/2018  7:49 AM   Next Dose Due:  At 7 pm this evening (5/17/18)                                       calcium carbonate-vitamin D 600-200 MG-UNIT Tabs   Take 1 tablet by mouth daily   Next Dose Due:  Tomorrow (5/18/18)                                    cefuroxime 500 MG tablet   Commonly known as:  CEFTIN   Take 1 tablet (500 mg) by mouth every 12 hours for 6 days   Last time this was given:  500 mg on 5/17/2018 12:18 PM   Next Dose Due:  At 9 pm this evening (5/17/18)                                       dextromethorphan-guaiFENesin  MG per 12 hr tablet   Commonly known as:  MUCINEX DM   Take 2 tablets by mouth 2 times daily as needed for cough   Next Dose Due:  Available at anytime today, if needed (5/17/18)                                    ipratropium - albuterol 0.5 mg/2.5 mg/3 mL 0.5-2.5 (3) MG/3ML neb solution   Commonly known as:  DUONEB   Take 1 vial (3 mLs) by nebulization every 6 hours   Last time this was given:  3 mLs on 5/15/2018  7:42 AM   Next Dose Due:  This evening (5/17/18)                                             * order for DME   Equipment being ordered: Nebulizer machine and supplies  [facility]  [phone] Fax: [phone]                                * order for DME   Equipment being ordered: Oxygen portable, O2 delivered at 3L and may wean O2 to 2.5 L as tolerated                                predniSONE 10 MG tablet   Commonly known as:  DELTASONE   6 tabs daily for 3days  Then 4 tabs daily for 3 days, then 3 tabs daily for 3 days, then 2 tabs daily for 3 days, then 1 tab daily for 3 days, then stop   Last time this was given:  60 mg on 5/17/2018 12:14 PM   Next Dose Due:  Tomorrow (5/18/18)   Notes to Patient:  6 tabs on 5/18, 5/19, 5/20  4 tabs on 5/21, 5/22, 5/23  3 tabs on 5/24, 5/25, 5/26  2 tabs on  5/27, 5/28, 5/29  1 tab on 5/30, 5/31, 6/1                                   VITAMIN D PO   Take 1 tablet by mouth daily Strength unknown   Next Dose Due:  Tomorrow (5/18/18)                                    * Notice:  This list has 2 medication(s) that are the same as other medications prescribed for you. Read the directions carefully, and ask your doctor or other care provider to review them with you.              More Information        Pneumonia (Adult)  Pneumonia is an infection deep within the lungs. It is in the small air sacs (alveoli). Pneumonia may be caused by a virus or bacteria. Pneumonia caused by bacteria is usually treated with an antibiotic. Severe cases may need to be treated in the hospital. Milder cases can be treated at home. Symptoms usually start to get better during the first 2 days of treatment.    Home care  Follow these guidelines when caring for yourself at home:    Rest at home for the first 2 to 3 days, or until you feel stronger. Don t let yourself get overly tired when you go back to your activities.    Stay away from cigarette smoke - yours or other people s.    You may use acetaminophen or ibuprofen to control fever or pain, unless another medicine was prescribed. If you have chronic liver or kidney disease, talk with your healthcare provider before using these medicines. Also talk with your provider if you ve had a stomach ulcer or gastrointestinal bleeding. Don t give aspirin to anyone younger than 18 years of age who is ill with a fever. It may cause severe liver damage.    Your appetite may be poor, so a light diet is fine.    Drink 6 to 8 glasses of fluids every day to make sure you are getting enough fluids. Beverages can include water, sport drinks, sodas without caffeine, juices, tea, or soup. Fluids will help loosen secretions in the lung. This will make it easier for you to cough up the phlegm (sputum). If you also have heart or kidney disease, check with your healthcare  provider before you drink extra fluids.    Take antibiotic medicine prescribed until it is all gone, even if you are feeling better after a few days.  Follow-up care  Follow up with your healthcare provider in the next 2 to 3 days, or as advised. This is to be sure the medicine is helping you get better.  If you are 65 or older, you should get a pneumococcal vaccine and a yearly flu (influenza) shot. You should also get these vaccines if you have chronic lung disease like asthma, emphysema, or COPD. Recently, a second type of pneumonia vaccine has become available for everyone over 65 years old. This is in addition to the previous vaccine. Ask your provider about this.  When to seek medical advice  Call your healthcare provider right away if any of these occur:    You don t get better within the first 48 hours of treatment    Shortness of breath gets worse    Rapid breathing (more than 25 breaths per minute)    Coughing up blood    Chest pain gets worse with breathing    Fever of 100.4 F (38 C) or higher that doesn t get better with fever medicine    Weakness, dizziness, or fainting that gets worse    Thirst or dry mouth that gets worse    Sinus pain, headache, or a stiff neck    Chest pain not caused by coughing  Date Last Reviewed: 1/1/2017 2000-2017 [employer]. [address] [place] [address]. All rights reserved. This information is not intended as a substitute for professional medical care. Always follow your healthcare professional's instructions.                Discharge Instructions: COPD  You have been diagnosed with chronic obstructive pulmonary disease (COPD). This is a name given to a group of diseases that limit the flow of air in and out of your lungs. This makes it harder to breathe. With COPD, you are also more likely to get lung infections. COPD includes chronic bronchitis and emphysema. COPD is most often caused by heavy, long-term cigarette smoking.  Home  care  Quit smoking    If you smoke, quit. It is the best thing you can do for your COPD and your overall health.    Join a stop-smoking program. There are even telephone, text message, and Internet programs to help you quit.    Ask your healthcare provider about medicines or other methods to help you quit.    Ask family members to quit smoking as well.    Don't allow people to smoke in your home, in your car, or when they are around you.  Protect yourself from infection    Wash your hands often. Do your best to keep your hands away from your face. Most germs are spread from your hands to your mouth.    Get a flu shot every year. Also ask your provider about pneumonia vaccines.    Avoid crowds. It's especially important to do this in the winter when more people have colds and flu.    To stay healthy, get enough sleep, exercise regularly, and eat a balanced diet. You should:  ? Get about 8 hours of sleep every night.  ? Try to exercise for at least 30 minutes on most days.  ? Have healthy foods including fruits and vegetables, 100% whole grains, lean meats and fish, and low-fat dairy products. Try to stay away from foods high in fats and sugar.  Take your medicines  Take your medicines exactly as directed. Don't skip doses.  Manage your stress  Stress can make COPD worse. Use this stress management technique:    Find a quiet place and sit or lie in a comfortable position.    Close your eyes and perform breathing exercises for several minutes. Ask your provider about the best way to breathe.  Pulmonary rehabilitation    Pulmonary rehab can help you feel better. These programs include exercise, breathing techniques, information about COPD, counseling, and help for smokers.    Ask your provider or your local hospital about programs in your area.  When to call your healthcare provider  Call your provider immediately if you have any of the following:    Shortness of breath, wheezing, or coughing    Increased  mucus    Yellow, green, bloody, or smelly mucus    Fever or chills    Tightness in your chest that does not go away with rest or medicine    An irregular heartbeat or a feeling that your heart is beating very fast    Swollen ankles   Date Last Reviewed: 5/1/2016 2000-2017 [employer]. [address] [place] [address]. All rights reserved. This information is not intended as a substitute for professional medical care. Always follow your healthcare professional's instructions.

## 2018-05-15 NOTE — PLAN OF CARE
"Problem: Patient Care Overview  Goal: Plan of Care/Patient Progress Review  Discharge Planner PT   Patient plan for discharge: Home, considering OP Pulmonary Rehab  Current status: Evaluation completed, treatment initiated. 72 yo female adm with worsening SOB and PNA. Upon admit, pt at rest on 2L pt was saturating in the 60s, O2 increased to 4/5L with 88-89% saturations. Pt reports 24/7 use of O2 at 2L via nc. States she walks independently, in her building and outside weather permitting, a few blocks at a time. Pt is indepednent with all ADLs and IADLs per report. States she isn't sure if her biggest limiting factor is \"my breathing or my arthritis, they both get in the way.\"  Presents today alert and oriented, on 4L via NC. O2 90% at rest. Independent with gait, transfers, toileting and hygiene at the sink. Becomes fatigued, but states \"I'm okay yet, lets walk.\" Ambulated 200' with, no AD, then request of hospitalist to return to room for exam. O2 Low 80s able to bring up to 89% with PLB. Fingers are cold and has arthritic fingers as well, ?optimal reading on sensor as pt able appears somewhat fatigued, but not significantly SOB.     Barriers to return to prior living situation: None.  Recommendations for discharge: Home with OP Pulmonary rehab.  Rationale for recommendations: Pt appears close to [facility], would benefit from OP Pulmonary rehab services to progress functional activity to[place] and general strength.   Entered by: Jeannie Centeno 05/15/2018 9:09 AM           "

## 2018-05-15 NOTE — PLAN OF CARE
Problem: Patient Care Overview  Goal: Plan of Care/Patient Progress Review  Outcome: Improving  VSS on 2L NC. A/Ox4. Up with Standby assist, ambulating in room. Tolerating regular diet. BS hypoactive. PCD's in place. Flatus present. Voiding to toilet. LS diminished throughout all fields, more course sounding in the lower lobes. Patient has sputum collection container at bedside to be sent to lab once it is filled.

## 2018-05-15 NOTE — PLAN OF CARE
Problem: Patient Care Overview  Goal: Plan of Care/Patient Progress Review  Outcome: No Change  VSS except O2 sats 90% on 4L. A&O. Lung sounds diminished and crackles heard posteriorly. Pt has productive cough with green phlegm. Adequate urine output. BS active and audible x4. Pt up with assistance of 1. Pt tolerating regular diet. IV saline locked. Pt denies pain.

## 2018-05-15 NOTE — PLAN OF CARE
Problem: Patient Care Overview  Goal: Plan of Care/Patient Progress Review  Discharge Planner OT   Patient plan for discharge: Unknown.  Current status: Order rec'd and chart reviewed. Patient's needs are being met by PT at this time.   Barriers to return to prior living situation: Defer to PT.   Recommendations for discharge: Defer to PT.   Rationale for recommendations: OT eval not indicated as patient's needs here are being met by PT. Order completed.       Entered by: Erica Hurtado 05/15/2018 10:01 AM

## 2018-05-15 NOTE — PLAN OF CARE
Problem: Patient Care Overview  Goal: Plan of Care/Patient Progress Review  Outcome: No Change  A&O with some confusion to time, easily reoriented. VSS on 3L. Tele: Sinus rhythm with PACs. Lung sounds course in lower lobes bilaterally. Encouraging pulmonary toileting. Pt still unable to provided sputum sample, reminded frequently of sample cup at bedside.

## 2018-05-15 NOTE — PROGRESS NOTES
05/15/18 0800   Quick Adds   Type of Visit Initial PT Evaluation   Living Environment   Lives With alone   Living Arrangements apartment   Home Accessibility no concerns   Transportation Available car;family or friend will provide   Self-Care   Dominant Hand right   Usual Activity Tolerance moderate   Current Activity Tolerance fair   Regular Exercise yes   Activity/Exercise Type walking  (walks the halls and outside weather permiting)   Equipment Currently Used at Home oxygen  (2L 24/7)   Activity/Exercise/Self-Care Comment States she takes frequent breaks to allow    Functional Level Prior   Ambulation 0-->independent   Transferring 0-->independent   Toileting 0-->independent   Bathing 0-->independent   Dressing 0-->independent   Eating 0-->independent   Communication 0-->understands/communicates without difficulty   Swallowing 0-->swallows foods/liquids without difficulty   Cognition 0 - no cognition issues reported   Fall history within last six months no   Which of the above functional risks had a recent onset or change? (functional activity tolerance)   Prior Functional Level Comment Independent with all mobility and ADLs, IADLs at [facility]   General Information   Onset of Illness/Injury or Date of Surgery - Date 05/14/18   Referring Physician Conor Fritz MD   Patient/Family Goals Statement None stated.   Pertinent History of Current Problem (include personal factors and/or comorbidities that impact the POC) 72 yo female adm with worseing SOB, found to have pna. States she uses 2L viia nc 24/7 baseline. Participates in walking most days in the halls and different floors of her senior building.    Precautions/Limitations oxygen therapy device and L/min  (4L via nc)   Cognitive Status Examination   Orientation orientation to person, place and time   Level of Consciousness alert   Follows Commands and Answers Questions 100% of the time;able to follow multistep instructions   Personal Safety and Judgment  "intact   Cognitive Comment A little impulsie at times, has many iems on her agenda.    Range of Motion (ROM)   ROM Comment B LEs WFL, B UEs WFL as obs via AROM. Note pt with kyphosis and limited trunk rotation wtih gait.    Strength   Strength Comments Demonstrates antigravity strength with mobility, howver, needs cues fo to reast secondary to decreased functional activity tolerance.   Bed Mobility   Bed Mobility Comments independent   Transfer Skills   Transfer Comments indepednent   Gait   Gait Comments Independent wtih gait in the room   Balance   Balance Comments Slight stway with static stance, dynamic balance-reaches for frurniture at times.    Sensory Examination   Sensory Perception Comments Demonstrates impaired   General Therapy Interventions   Planned Therapy Interventions balance training;bed mobility training;gait training;neuromuscular re-education;strengthening;ROM;stretching;transfer training;progressive activity/exercise   Clinical Impression   Criteria for Skilled Therapeutic Intervention yes, treatment indicated   PT Diagnosis Impaired functional activity tolerance   Influenced by the following impairments Fatigue, generalized weakness   Functional limitations due to impairments Decreaed functional independence   Clinical Presentation Stable/Uncomplicated   Clinical Decision Making (Complexity) Low complexity   Therapy Frequency` daily   Predicted Duration of Therapy Intervention (days/wks) 1-2 more visits   Anticipated Discharge Disposition Home with Outpatient Therapy   Risk & Benefits of therapy have been explained Yes   Patient, Family & other staff in agreement with plan of care Yes   [facility] [email or web address] TM \"6 Clicks\"   2016, Trustees of [facility], under license to [employer].  All rights reserved.   6 Clicks Short Forms Basic Mobility Inpatient Short Form   [facility] [employer]  \"6 Clicks\" V.2 Basic Mobility Inpatient Short Form   1. Turning from your back to your side " while in a flat bed without using bedrails? 4 - None   2. Moving from lying on your back to sitting on the side of a flat bed without using bedrails? 4 - None   3. Moving to and from a bed to a chair (including a wheelchair)? 4 - None   4. Standing up from a chair using your arms (e.g., wheelchair, or bedside chair)? 4 - None   5. To walk in hospital room? 4 - None   6. Climbing 3-5 steps with a railing? 4 - None   Basic Mobility Raw Score (Score out of 24.Lower scores equate to lower levels of function) 24   Total Evaluation Time   Total Evaluation Time (Minutes) 10

## 2018-05-15 NOTE — PROGRESS NOTES
[facility]  Hospitalist Progress Note   05/15/2018          Assessment and Plan:       Kristine L Soulier is a 73 year old female with a history of pneumothorax, COPD brought into the ED via EMS for evaluation of shortness of breath from [facility]    Acute on chronic hypoxic respiratory failure from obstructive airway disease exacerbation/pneumonia  Acute exacerbation of obstructive airway disease.  Community acquired pneumonia.  History of chronic obstructive pulmonary disease on home oxygen.  She is a resident of [facility], had been to [facility] [address] to refill her anxiety medication. Having worsening shortness of breath associated with cough and increased oxygen requirements since the weekend. Is on baseline 222.5 liters of nasal oxygen. In the ED on arrival pulse oximetry on room air was 69%, and on 2 L oxygen in low/mid 90s. Noted to have wheezing with decreased breath sounds.   Chest x-ray showed diffuse reticular nodular bilateral lung capacities.   Lactic acid 1.8, , WBC 18.5, hemoglobin 10.3, creatinine 0.43.    Duo nebs Q4 hours PRN.  Albuterol nebs Q2 hours PRN.  Solu-Medrol 62.5 mg two times a day.  Continue ceftriaxone and azithromycin [5/14.]  Follow blood culture results, de-escalate antibiotics as able to tolerate.  Aggressive incentives spirometry, pulmonary toileting.  Wean nasal oxygen as able to tolerate. Currently on 4 to 5 L nasal oxygen.  Supportive care with Robitussin, supercar lozenges, normal saline nasal drops.  Check Pro calcitonin, venous blood gas. WBC count.    Positive troponin likely in the setting of demand from hypoxia/COPD exacerbation/pneumonia.  Patient denies any chest pain or palpitations.  Troponin 0.071 trended up to 0.092 and this morning at 0.111  EKG shows sinus tachycardia heart rate of 104 T-wave inversions in anterior leads.  Telemetry monitor shows sinus rhythm with PACs  Echocardiogram for evaluation of heart  function/valvular abnormalities.  Continue telemetry monitoring.    Anxiety disorder.  Patient states she is on PRN lorazepam, [old prescription ] and would like a refill at the time of discharge.  Pharmacy unable to obtain info for the treatment [have checked with [facility], [place], [facility] website.]  did discuss given hypoxic respiratory failure would hold off benzos at this time.    Hypertension not on medications.  Patient has been having systolic blood pressures in 140s to 160s, on steroid therapy.  Patient attributes it to her anxiety.  Does not take any antihypertensives at home.  PRN IV hydralazine ordered.  Closely monitor blood pressures     Deconditioned from acute illness/chronic debility.  Patient lives at home independently. Physical therapy/occupational therapy assessment requested.    # Pain Assessment:  Current Pain Score 5/15/2018   Patient currently in pain? denies   Pain score (0-10) -   Pain descriptors -   CPOT pain score -   Anay nuno pain level was assessed and she currently denies pain.      Active Diet Order      Combination Diet Regular Diet Adult    DVT Prophylaxis:  pneumatic compression device.  Code Status: DNR  Disposition: Expected discharge in 1-2 days pending clinical improvement.    Patient, interdisciplinary team involved in care and agrees with plan.  Total time - Greater than 35 min. More than 50% of time spent in direct patient care, care coordination and formalizing plan of care.     Luz Elena Huntley MD        Interval History:      Patient appears comfortable on 4 to 5 L nasal oxygen, denies any chest pain or shortness of breath. Continues to have cough with cream colored phlegm. No nausea or vomiting. Tolerating oral diet. Has been ambulating with assistance from physical therapy.         Physical Exam:        Physical Exam   Temp:  [97.6  F (36.4  C)-98.9  F (37.2  C)] 98.6  F (37  C)  Pulse:  [] 96  Heart Rate:  [90-95] 95  Resp:  [16-18] 18  BP:  (138-161)/(71-83) 161/83  SpO2:  [86 %-100 %] 98 %    Intake/Output Summary (Last 24 hours) at 05/15/18 1534  Last data filed at 05/15/18 1500   Gross per 24 hour   Intake             1210 ml   Output              225 ml   Net              985 ml     Admission Weight: 47.9 kg (105 lb 8 oz)  Current Weight: 46.6 kg (102 lb 11.8 oz)    PHYSICAL EXAM  GENERAL: Patient is in no distress. Alert and oriented.  HEENT: Oropharynx pink, moist. Pupils equal  HEART: Regular rate and rhythm. S1S2. No murmurs  LUNGS: bilateral decreased breath sounds, fine wheezing all over lung fields.  ABDOMEN: Soft, no abdominal tenderness, bowel sounds heard   NEURO: Cranial nerves II-XII intact. Moving all extremities  EXTREMITIES: No pedal edema. 2+ peripheral pulses.  SKIN: Warm, dry. No rash or bruising.  PSYCHIATRY Cooperative       Medications:          arformoterol  15 mcg Nebulization BID     azithromycin  250 mg Intravenous Q24H     budesonide  0.5 mg Nebulization BID     Calcium carb-Vitamin D 500 mg [place]-200 units  1 tablet Oral Daily     cefTRIAXone  2 g Intravenous Q24H     methylPREDNISolone  62.5 mg Intravenous Q12H     acetaminophen, acetaminophen (TYLENOL) tablet 500 mg, albuterol, albuterol, sore throat lozenge, bisacodyl, guaiFENesin-dextromethorphan, hydrALAZINE, ipratropium - albuterol 0.5 mg/2.5 mg/3 mL, lidocaine visc 2% & maalox/mylanta w/simethicone & diphenhydramine, melatonin, naloxone, ondansetron **OR** ondansetron, senna-docusate **OR** senna-docusate, sodium chloride         Data:      All new lab and imaging data was reviewed.

## 2018-05-16 NOTE — PLAN OF CARE
Problem: Patient Care Overview  Goal: Plan of Care/Patient Progress Review  PT-  Pt busy with another discipline at time of PT appt.  Unable to see at this time.

## 2018-05-16 NOTE — PROGRESS NOTES
Pt was placed on BiPAP 10/5 and 40%, alarm setting was set at 10. RT will continue to follow the pt.     Marko Gonzalez RT.

## 2018-05-16 NOTE — PLAN OF CARE
Problem: Patient Care Overview  Goal: Plan of Care/Patient Progress Review  Outcome: Improving  A&O. VSS on 4L. A-1. Tele: Sinus rhythm with PACs. Course crackles bilaterally.Denied pain overnight. Resting comfortable.

## 2018-05-16 NOTE — PROGRESS NOTES
[facility]  Hospitalist Progress Note   05/16/2018          Assessment and Plan:       Kristine L Soulier is a 73 year old female with a history of pneumothorax, COPD brought into the ED via EMS for evaluation of shortness of breath from [facility]    Acute on chronic hypoxic hypercapnic respiratory failure from obstructive airway disease exacerbation/pneumonia  Acute exacerbation of obstructive airway disease.  Community acquired pneumonia.  History of chronic obstructive pulmonary disease on home oxygen.  She is a resident of [facility], had been to [facility] to refill her anxiety medication. Noted to have worsening shortness of breath associated with cough and increased oxygen requirements since the weekend. Is on baseline 2 -2.5 liters of nasal oxygen. In the ED on arrival pulse oximetry on room air was 69%, and on 2 L oxygen in low/mid 90s. Noted to have wheezing with decreased breath sounds.   Chest x-ray showed diffuse Reticular Nodular bilateral lung capacities.   Lactic acid 1.8, Pro , WBC 18.5, hemoglobin 10.3, creatinine 0.43.  Pro calcitonin 0.75 glucose 125, PCO2 75  Bicarb of 36 [has had Bicarb between 30 to 40 previously]  blood cultures no growth to date.  Sputum cultures show ground negative, gram-positive bacteria.    Duo nebs Q4 hours PRN.  Albuterol nebs Q2 hours PRN.  Solu-Medrol 62.5 mg paper to once a day [very minimal wheezing.  Continue ceftriaxone and azithromycin [5/14.]  Follow blood culture results, de-escalate antibiotics as able to tolerate.  Aggressive incentives spirometry, pulmonary toileting.  Wean nasal oxygen as able to tolerate. Currently on 4 L nasal oxygen.  Supportive care with Robitussin, supercar lozenges, normal saline nasal drops.  PRN BiPAP intermittently given hypoxic hyper Respiratory failure.  Chest x-ray shows reticulonodular bilateral opacities, last CT chest in 2015. Patient states she intermittently feels that  she has to catch of breath. CT chest with contrast to rule out PE / ? Congestion/infiltrate    Positive troponin likely in the setting of demand from hypoxia/COPD exacerbation/pneumonia.  Patient denies any chest pain or palpitations.  Troponin 0.071 > 0.092 > 0.111  EKG shows sinus tachycardia heart rate of 104 T-wave inversions in anterior leads.  Telemetry monitor shows sinus rhythm with PACs  Echocardiogram for evaluation of heart function/valvular abnormalities pending.  Continue telemetry monitoring.    Reactive leukocytosis from steroids  Patient has slightly elevated leukocyte count with WBC of 12,000. Likely reactive from steroid therapy    Anxiety disorder.  Patient states she is on PRN lorazepam, [old prescription ] and would like a refill at the time of discharge.  Pharmacy unable to obtain info for the treatment [[facility] website.]  did discuss given hypoxic respiratory failure would hold off benzos at this time.    Hypertension not on medications.  Patient has been having systolic blood pressures in 140s to 160s, on steroid therapy.  Patient attributes it to her anxiety.  Does not take any antihypertensives at home.  PRN IV hydralazine ordered.  Closely monitor blood pressures     Deconditioned from acute illness/chronic debility.  Patient lives at home independently.   Physical therapy/occupational therapy assessment ongoing    # Pain Assessment:  Current Pain Score 2018   Patient currently in pain? denies   Pain score (0-10) -   Pain descriptors -   CPOT pain score -   Anay nuno pain level was assessed and she currently denies pain.      Active Diet Order      Combination Diet Regular Diet Adult    DVT Prophylaxis:  pneumatic compression device.  Code Status: DNR  Disposition: Expected discharge in 1-2 days pending clinical improvement to home with outpatient pulmonary rehab.    Patient, interdisciplinary team involved in care and agrees with  plan.  Total time - Greater than 25 min. More than 50% of time spent in direct patient care, care coordination and formalizing plan of care.     Luz Elena Huntley MD        Interval History:      Patient appears comfortable on 4 L nasal oxygen, desaturate's on minimal ambulation. Continues to have intermittent cough. Denies any chest pain or shortness of breath.  No nausea or vomiting. Tolerating oral diet. Has been ambulating with assistance from physical therapy/nursing staff.         Physical Exam:        Physical Exam   Temp:  [97.5  F (36.4  C)-98.9  F (37.2  C)] 97.7  F (36.5  C)  Pulse:  [] 90  Heart Rate:  [79-95] 88  Resp:  [14-18] 16  BP: (149-166)/(72-91) 166/78  SpO2:  [90 %-100 %] 99 %    Intake/Output Summary (Last 24 hours) at 05/15/18 1534  Last data filed at 05/15/18 1500   Gross per 24 hour   Intake             1210 ml   Output              225 ml   Net              985 ml     Admission Weight: 47.9 kg (105 lb 8 oz)  Current Weight: 46.6 kg (102 lb 11.8 oz)    PHYSICAL EXAM  GENERAL: Patient is in no distress. Alert and oriented.  HEENT: Oropharynx pink, moist. Pupils equal  HEART: Regular rate and rhythm. S1S2. No murmurs  LUNGS: bilateral decreased breath sounds, minimal wheezing all over lung fields. No crackles  ABDOMEN: Soft, no abdominal tenderness, bowel sounds heard   NEURO: Cranial nerves II-XII intact. Moving all extremities  EXTREMITIES: No pedal edema. 2+ peripheral pulses.  SKIN: Warm, dry. No rash or bruising.  PSYCHIATRY Cooperative       Medications:          arformoterol  15 mcg Nebulization BID     azithromycin  250 mg Intravenous Q24H     budesonide  0.5 mg Nebulization BID     Calcium carb-Vitamin D 500 mg [place]-200 units  1 tablet Oral Daily     cefTRIAXone  2 g Intravenous Q24H     methylPREDNISolone  62.5 mg Intravenous Q24H     acetaminophen, acetaminophen (TYLENOL) tablet 500 mg, albuterol, albuterol, sore throat lozenge, bisacodyl, guaiFENesin-dextromethorphan,  hydrALAZINE, ipratropium - albuterol 0.5 mg/2.5 mg/3 mL, lidocaine visc 2% & maalox/mylanta w/simethicone & diphenhydramine, melatonin, naloxone, ondansetron **OR** ondansetron, senna-docusate **OR** senna-docusate, sodium chloride         Data:      All new lab and imaging data was reviewed.

## 2018-05-16 NOTE — PLAN OF CARE
Problem: Patient Care Overview  Goal: Plan of Care/Patient Progress Review  Outcome: No Change  A&O. VSS on 4L. Tele: Sinus rhythm with PACs. crackles bilaterally at the bases. Encouraging pulmonary toileting. ambulated in halls with SBA and no c/o dyspnea, though did desaturate with quick return to the mid 90s at rest.

## 2018-05-16 NOTE — PLAN OF CARE
Problem: Patient Care Overview  Goal: Plan of Care/Patient Progress Review  Outcome: No Change  VSS except tachy at times. When pt came back from walk, heart rate was in the 160's, but asymptomatic. HR returned to normal limits after rest. Lung sounds diminished with coarse crackles posterior bilateral. Frequent non productive cough. Pt denies pain. Pt on intermittent bipap with NPO status for an hour after meals, otherwise regular diet, pt tolerating. Pt ambulated in burgess with SBA. Pt on 4L O2. Adequate urine output. BS active and audible x4. CT done of chest. Echo completed this shift.

## 2018-05-17 NOTE — PLAN OF CARE
Problem: Patient Care Overview  Goal: Plan of Care/Patient Progress Review  Outcome: Adequate for Discharge Date Met: 05/17/18  1715: Pt A&Ox4; hypertensive but stable; O2 sats stable on 3L O2 (baseline); Continues on neb treatment and abx. Pt verbalizes improvement in breathing. IS encouraged. Tolerating diet. Discharge instructions reviewed with pt/ pt verbalizes understanding of all instructions. A copy of AVS given. Discharge prescriptions filled here and given to pt. Belongings returned. Pt discharged from floor with own O2 on, via w/c and accompanied by nursing staff. Pt taking taxi for ride home.

## 2018-05-17 NOTE — PROGRESS NOTES
Met with patient re: discharge plan and order for Pulmonary rehab.  Patient stated that she wished to make her own follow up appointments with her PCP and Pulmonary since she has to arrange for transportation.   Patient stated that she does have her own neb machine at home.  Encouraged patient to participate in OP Pulmonary rehab.

## 2018-05-17 NOTE — DISCHARGE SUMMARY
[facility]  Discharge Summary        Kristine L Soulier MRN# [id number]   YOB: 1945 Age: 73 year old     Date of Admission:  5/14/2018  Date of Discharge:  5/17/2018  Admitting Physician:  Conor Fritz MD  Discharge Physician: Mayela Louie MD  Discharging Service: Hospitalist     Primary Provider: Mayra Ferreira  Primary Care Physician Phone Number: [phone]         Discharge Diagnoses/Problem Oriented Hospital Course (Providers):    Kristine L Soulier was admitted on 5/14/2018 by Conor Fritz MD and I would refer you to their history and physical.  The following problems were addressed during her hospitalization:  Kristine L Soulier is a 73 year old female with a history of pneumothorax, COPD brought into the ED via EMS for evaluation of shortness of breath from [facility]     Acute on chronic hypoxic hypercapnic respiratory failure from obstructive airway disease exacerbation/pneumonia  Acute exacerbation of obstructive airway disease.  Community acquired pneumonia.  History of chronic obstructive pulmonary disease on home oxygen.  She is a resident of [facility], had been to [facility] to refill her anxiety medication. Noted to have worsening shortness of breath associated with cough and increased oxygen requirements since the weekend. Is on baseline 2 -2.5 liters of nasal oxygen. In the ED on arrival pulse oximetry on room air was 69%, and on 2 L oxygen in low/mid 90s. Noted to have wheezing with decreased breath sounds.   Chest x-ray showed diffuse Reticular Nodular bilateral lung capacities.   Lactic acid 1.8, Pro , WBC 18.5, hemoglobin 10.3, creatinine 0.43.  Pro calcitonin 0.75 glucose 125, PCO2 75  Bicarb of 36 [has had Bicarb between 30 to 40 previously]  blood cultures no growth to date.  Sputum cultures show ground negative, gram-positive bacteria.     Duo nebs Q4 hours PRN.  Albuterol nebs Q2 hours  PRN.  Solu-Medrol 62.5 mg paper to once a day [very minimal wheezing.  Continue ceftriaxone and azithromycin [.]  Follow blood culture results, de-escalate antibiotics as able to tolerate.  Aggressive incentives spirometry, pulmonary toileting.  Wean nasal oxygen as able to tolerate. Currently on 4 L nasal oxygen.  Supportive care with Robitussin, supercar lozenges, normal saline nasal drops.  PRN BiPAP intermittently given hypoxic hyper Respiratory failure.  Chest x-ray shows reticulonodular bilateral opacities, last CT chest in . Patient states she intermittently feels that she has to catch of breath. CT chest with contrast to rule out PE / ? Congestion/infiltrate    She feels much better today and wants to go home today  Discontinued Solu-Medrol since switched to oral prednisone switched IV ceftriaxone to oral Ceftin today  She is back on 3 L of oxygen by nasal cannula which is his baseline for patient     Positive troponin likely in the setting of demand from hypoxia/COPD exacerbation/pneumonia.  Patient denies any chest pain or palpitations.  Troponin 0.071 > 0.092 > 0.111  EKG shows sinus tachycardia heart rate of 104 T-wave inversions in anterior leads.  Telemetry monitor shows sinus rhythm with PACs  Echocardiogram echo done showed EF greater than 60% with pulmonary hypertension 1-2+ TR  Otherwise negative    Reactive leukocytosis from steroids  Patient has slightly elevated leukocyte count with WBC of 12,000. Likely reactive from steroid therapy     Anxiety disorder.  Patient states she is on PRN lorazepam, [old prescription ] and would like a refill at the time of discharge.  Pharmacy unable to obtain info for the treatment [have checked with [facility], [facility] website.]  did discuss given hypoxic respiratory failure would hold off benzos at this time.     Hypertension not on medications.  Patient has been having systolic blood pressures in 140s to 160s, on steroid  therapy.  Patient attributes it to her anxiety.  Does not take any antihypertensives at home.  PRN IV hydralazine ordered.  Closely monitor blood pressures      Deconditioned from acute illness/chronic debility.  Patient lives at home independently.   Physical therapy/occupational therapy assessment ongoing     # Pain Assessment:  Current Pain Score 5/16/2018   Patient currently in pain? denies   Pain score (0-10) -   Pain descriptors -   CPOT pain score -   Anay nuno pain level was assessed and she currently denies pain.       Active Diet Order      Combination Diet Regular Diet Adult    DVT Prophylaxis:  pneumatic compression device.  Code Status: DNR  Disposition: Home today           Code Status:      DNR        Brief Hospital Stay Summary Sent Home With Patient in AVS:               Important Results:      See below        Pending Results:        Unresulted Labs Ordered in the Past 30 Days of this Admission     Date and Time Order Name Status Description    5/14/2018 1211 Blood culture Preliminary     5/14/2018 1211 Blood culture Preliminary             Discharge Instructions and Follow-Up:      Follow-up Appointments     Follow-up and recommended labs and tests        Follow up with primary care provider, Mayra Ferreira, within 7   days for hospital follow- up.  No follow up labs or test are needed.                      Discharge Disposition:      Discharged to home        Discharge Medications:        Current Discharge Medication List      START taking these medications    Details   azithromycin (ZITHROMAX) 250 MG tablet Take 1 tablet (250 mg) by mouth daily for 2 days  Qty: 2 tablet, Refills: 0    Associated Diagnoses: Community acquired pneumonia, unspecified laterality      cefuroxime (CEFTIN) 500 MG tablet Take 1 tablet (500 mg) by mouth every 12 hours for 6 days  Qty: 12 tablet, Refills: 0    Associated Diagnoses: Community acquired pneumonia, unspecified laterality      ipratropium - albuterol  0.5 mg/2.5 mg/3 mL (DUONEB) 0.5-2.5 (3) MG/3ML neb solution Take 1 vial (3 mLs) by nebulization every 6 hours  Qty: 360 mL, Refills: 0    Associated Diagnoses: Pulmonary emphysema, unspecified emphysema type (H)      predniSONE (DELTASONE) 10 MG tablet 6 tabs daily for 3days  Then 4 tabs daily for 3 days, then 3 tabs daily for 3 days, then 2 tabs daily for 3 days, then 1 tab daily for 3 days, then stop  Qty: 60 tablet, Refills: 0    Associated Diagnoses: Pulmonary emphysema, unspecified emphysema type (H)         CONTINUE these medications which have NOT CHANGED    Details   ACETAMINOPHEN PO Take 1,000 mg by mouth every 8 hours as needed       albuterol (PROAIR HFA/PROVENTIL HFA/VENTOLIN HFA) 108 (90 Base) MCG/ACT Inhaler Inhale 2 puffs into the lungs every 6 hours as needed for shortness of breath / dyspnea or wheezing  Refills: 0      BROVANA 15 MCG/2ML NEBU Take 15 mcg by nebulization 2 times daily       budesonide (PULMICORT) 0.5 MG/2ML nebulizer solution Take 0.5 mg by nebulization 2 times daily       calcium carbonate-vitamin D 600-200 MG-UNIT TABS Take 1 tablet by mouth daily   Refills: 0      Cholecalciferol (VITAMIN D PO) Take 1 tablet by mouth daily Strength unknown      dextromethorphan-guaiFENesin (MUCINEX DM)  MG per 12 hr tablet Take 2 tablets by mouth 2 times daily as needed for cough      !! ORDER FOR DME Equipment being ordered: Nebulizer machine and supplies    [facility]   [phone]  Fax: [phone]  Qty: 1 Device, Refills: 0    Associated Diagnoses: SOB (shortness of breath); COPD exacerbation (H)      !! ORDER FOR DME Equipment being ordered: Oxygen portable, O2 delivered at 3L and may wean O2 to 2.5 L as tolerated  Qty: 1 Device, Refills: 0    Associated Diagnoses: COPD (chronic obstructive pulmonary disease) (H); Pneumonia       !! - Potential duplicate medications found. Please discuss with provider.            Allergies:       No Known Allergies        Consultations This  "Hospital Stay:      No consultations were requested during this admission        Condition and Physical on Discharge:      Discharge condition: Stable   Vitals: Blood pressure 157/73, pulse 86, temperature 98.8  F (37.1  C), temperature source Oral, resp. rate 16, height 1.6 m (5' 3\"), weight 47.9 kg (105 lb 9.6 oz), SpO2 97 %.     Constitutional:  Alert and awake   Lungs:  Good air entry, no active wheezing   Cardiovascular:  Normal rate rhythm   Abdomen:  Soft nontender nondistended   Skin:  Warm and dry   Other:          Discharge Time:      Greater than 30 minutes.        Image Results From This Hospital Stay (For Non-EPIC Providers):        Results for orders placed or performed during the hospital encounter of 05/14/18   XR Chest 2 Views    Narrative    XR CHEST 2 VW 5/14/2018 1:06 PM    HISTORY: Cough.    COMPARISON: May 14, 2018.      Impression    IMPRESSION: Diffuse reticular nodular bilateral lung opacities are  again seen, not significantly changed from prior examination. No  pleural effusions or pneumothorax. Stable heart and mediastinum. The  lungs are hyperexpanded as before.    EDGARD GARCÍA MD   CT Chest Pulmonary Embolism w Contrast    Narrative    CT CHEST PULMONARY EMBOLISM WITH CONTRAST   5/16/2018 11:21 AM     HISTORY: Reticulonodular opacities with acute on chronic hypoxia.     TECHNIQUE:  52 mL Isovue-370. Pulmonary arterial phase images of the  chest after nonionic intravenous contrast. Radiation dose for this  scan was reduced using automated exposure control, adjustment of the  mA and/or kV according to patient size, or iterative reconstruction  technique.    COMPARISON: 1/7/2015.    FINDINGS:  No evidence of pulmonary embolism. No evidence of acute  thoracic aortic abnormality. Moderate-to-severe coronary  calcifications are present.    There is marked bilateral peribronchial thickening. Numerous nodular  opacities scattered throughout both lungs, predominantly in the lower  lobes, appear " to be predominantly related to mucous plugging within  the bronchi. There are enlarged mediastinal and hilar lymph nodes. A  precarinal lymph node measures 1.9 x 1.6 cm. A right hilar lymph node  measures 1.1 cm in short axis. No axillary adenopathy. No pleural or  pericardial effusion.      Impression    IMPRESSION:  1. No evidence of pulmonary embolism or acute thoracic aortic  abnormality.  2. Severe coronary atherosclerosis.  3. Marked peribronchial wall thickening and a mucous plugging,  worsened compared to prior. This likely represents severe bronchitis.  4. Prominent mediastinal and hilar lymph nodes are similar to the  prior study.    CYRIL MELENDREZ MD           Most Recent Lab Results In EPIC (For Non-EPIC Providers):    Most Recent 3 CBC's:  Recent Labs   Lab Test  05/16/18   0720  05/15/18   0740  05/14/18   1220  07/08/15   0903   WBC  7.8  12.0*  18.5*  9.6   HGB   --   10.0*  10.3*  12.4   MCV   --   100  99  100   PLT   --   321  347  333      Most Recent 3 BMP's:  Recent Labs   Lab Test  05/16/18   0720  05/15/18   0740  05/14/18   1220   NA  137  136  133   POTASSIUM  4.6  4.9  3.7   CHLORIDE  96  97  92*   CO2  36*  35*  34*   BUN  18  14  14   CR  0.35*  0.29*  0.43*   ANIONGAP  5  4  7   ISADORA  8.8  9.1  8.9   GLC  125*  130*  120*     Most Recent 3 Troponin's:  Recent Labs   Lab Test  05/15/18   0740  05/14/18   1845  05/14/18   1220   TROPI  0.111*  0.092*  0.071*     Most Recent 3 INR's:No lab results found.  Most Recent 2 LFT's:  Recent Labs   Lab Test  05/16/18   0720  12/07/15   0935   AST  18  23   ALT  19  26   ALKPHOS  91  101   BILITOTAL  0.2  0.5     Most Recent Cholesterol Panel:  Recent Labs   Lab Test  09/08/14   1316   CHOL  221*   LDL  141*   HDL  64   TRIG  78     Most Recent 6 Bacteria Isolates From Any Culture (See EPIC Reports for Culture Details):  Recent Labs   Lab Test  05/15/18   0907  05/14/18   1310  05/14/18   1220  01/07/15   1532  10/10/14   0925  04/16/14   1841    CULT  Light growth  Normal charis    No growth after 3 days  No growth after 3 days  No growth  >100,000 colonies/mL Citrobacter freundii complex*  Mycobacterium avium intracellulare complex Identification by MALDI-TOF Test   developed and characteristics determined by [facility]. See Compliance   Statement B at [email or web address]. Negative for Mycobacterium tuberculosis complex   by DNA probe.  Assayed at [facility] [address]  Critical Value called to and read back by DR PADGETT 05/05/14@1612,BR  *  Aspergillus fumigatus isolated  No additional fungi cultured after 1 week incubation  Unable to hold 4 weeks due to overgrowth of fungus  *  Unable to complete culture, fungal contamination  No Legionella species isolated to date  refer to Fungus culture for identification of fungus.    Normal respiratory charis  Light growth Aspergillus fumigatus  *  Charge credited  Incorrectly ordered by PCU/Clinic  BRONCH CULTURED ORDERED ON THIS ACCESSION.       Most Recent TSH, T4 and HgbA1c:   Recent Labs   Lab Test  07/08/15   0903   TSH  1.15

## 2018-05-17 NOTE — PLAN OF CARE
Problem: Patient Care Overview  Goal: Plan of Care/Patient Progress Review  PT:  Discharge Planner PT   Patient plan for discharge: Return home  Current status: Declined gait but participated well in standing strengthening activities with rest breaks needed for SOB. O2 sats >90%.   Barriers to return to prior living situation: None  Recommendations for discharge: return home with OP pulmonary rehab  Rationale for recommendations: Pt would benefit from continued PT to improve strength, balance, mobility to increase independence, reduce falls risk, inc activity tolerance.       Entered by: Yuki Maynard 05/17/2018 12:03 PM

## 2018-05-17 NOTE — PLAN OF CARE
Problem: Patient Care Overview  Goal: Plan of Care/Patient Progress Review  A+Ox4. VSS on 3L NC. Was on bipap for a few hours and refused at 2330 for the rest of the night. Tele NSR. LS coarse, congested cough. . Regular diet. Up SBA.

## 2018-05-17 NOTE — PLAN OF CARE
Problem: Patient Care Overview  Goal: Plan of Care/Patient Progress Review  Outcome: No Change  A/Ox4. VSS. Tele: NSR. LS coarse with crackles. IS to 500. On intermittent BiPAP when not on BiPAP on 3lpm NC. +BS, +flatus. Voiding adequately. Denies pain. Tolerating diet. Up with SBA.

## 2018-05-18 NOTE — TELEPHONE ENCOUNTER
"ED/Discharge Protocol    \"Hi, my name is PARRIS Cui RN, a registered nurse, and I am calling on behalf of Dr. Nelson's office at [place].  I am calling to follow up and see how things are going for you after your recent visit.\"    \"I see that you were in the (ER/UC/IP) on 5/14/18.    How are you doing now that you are home?\" very well, laying low  Is patient experiencing symptoms that may require a hospital visit?  no    Discharge Instructions    \"Let's review your discharge instructions.  What is/are the follow-up recommendations?  Pt. Response: Follow-up with pulmonary and PCP    \"Were you instructed to make a follow-up appointment?\"  Pt. Response: Yes.  Has appointment been made?   No.  \"Can I help you schedule that appointment?\" Scheduled for Friday 5/25/18, patient has to arrange [employer] ride      \"When you see the provider, I would recommend that you bring your discharge instructions with you.    Medications    \"How many new medications are you on since your hospitalization/ED visit?\"    Will bring list with, was given 3 new meds, but will finish prednisone and antibiotics before appointment.  \"How many of your current medicines changed (dose, timing, name, etc.) while you were in the hospital/ED visit?\"   0-1  \"Do you have questions about your medications?\"   No  \"Were you newly diagnosed with heart failure, COPD, diabetes or did you have a heart attack?\"   No  For patients on insulin: \"Did you start on insulin in the hospital or did you have your insulin dose changed?\"   No    Medication reconciliation completed? No, due to patient does not have papers near phone    Was MTM referral placed (*Make sure to put transitions as reason for referral)?   No    Call Summary    \"Do you have any questions or concerns about your condition or care plan at the moment?\"    No  Triage nurse advice given: Bring new med with you    Patient was in ER  in the past year (assess appropriateness of ER visits.)      \"If you " "have questions or things don't continue to improve, we encourage you contact us through the main clinic number,  [phone].  Even if the clinic is not open, triage nurses are available 24/7 to help you.     We would like you to know that our clinic has extended hours (provide information).  We also have urgent care (provide details on closest location and hours/contact info)\"      \"Thank you for your time and take care!\"      "

## 2018-05-18 NOTE — PLAN OF CARE
Problem: Patient Care Overview  Goal: Plan of Care/Patient Progress Review  Physical Therapy Discharge Summary    Reason for therapy discharge:    Discharged to Home with pulmonary rehab.     Progress towards therapy goal(s). See goals on Care Plan in [facility] electronic health record for goal details.  Goals not met.  Barriers to achieving goals:   Goal of stairs not addressed. No documentation as to whether she can verbalize 2 ways to conserve energy and did not assess balance challenges during gait. .    Therapy recommendation(s):    Continued therapy is recommended.  Rationale/Recommendations:  Continued pulmonary rehab to increase her functional endurance. .

## 2018-05-18 NOTE — TELEPHONE ENCOUNTER
"ED / Discharge Outreach Protocol    Patient Contact    Attempt # 1    Was call answered?  No.  Left message on voicemail with information to call me back.    \"Farhan this is Jenna, a nurse from [facility], calling to follow up regarding your recent [place] visit. Please call [phone] and ask to speak to a nurse if you have any further questions or concerns. Thank you.\"  LOLIS West      "

## 2018-05-25 NOTE — MR AVS SNAPSHOT
After Visit Summary   5/25/2018    Kristine L Soulier    MRN: [id number]           Patient Information     Date Of Birth          1945        Visit Information        Provider Department      5/25/2018 10:00 AM Carlos Alberto Nelson MD [facility]        Today's Diagnoses     Hypoxia    -  1    Pulmonary emphysema, unspecified emphysema type (H)        Community acquired pneumonia, unspecified laterality           Follow-ups after your visit        Your next 10 appointments already scheduled     Jun 08, 2018 10:40 AM CDT   Office Visit with Carlos Alberto Nelson MD   [facility] ([facility])    [address] [place] [id number] [phone]           Bring a current list of meds and any records pertaining to this visit. For Physicals, please bring immunization records and any forms needing to be filled out. Please arrive 10 minutes early to complete paperwork.              Who to contact     If you have questions or need follow up information about today's clinic visit or your schedule please contact [facility] directly at [phone].  Normal or non-critical lab and imaging results will be communicated to you by Spinlisterhart, letter or phone within 4 business days after the clinic has received the results. If you do not hear from us within 7 days, please contact the clinic through [employer] or phone. If you have a critical or abnormal lab result, we will notify you by phone as soon as possible.  Submit refill requests through [email or web address] or call your pharmacy and they will forward the refill request to us. Please allow 3 business days for your refill to be completed.          Additional Information About Your Visit        Spinlisterhart Information     [email or web address] lets you send messages to your doctor, view your test results, renew your prescriptions, schedule appointments and more. To sign up, go to [email or web address] . Click on  "\"Log in\" on the left side of the screen, which will take you to the Welcome page. Then click on \"Sign up Now\" on the right side of the page.     You will be asked to enter the access code listed below, as well as some personal information. Please follow the directions to create your username and password.     Your access code is: [email or web address]  Expires: 2018 11:20 AM     Your access code will  in 90 days. If you need help or a new code, please call your [place] clinic or [phone].        Care EveryWhere ID     This is your Care EveryWhere ID. This could be used by other organizations to access your [place] medical records  [id number]        Your Vitals Were     Pulse Temperature Respirations Height Pulse Oximetry BMI (Body Mass Index)    116 98.6  F (37  C) (Oral) 20 5' 3\" (1.6 m) 85% 18.16 kg/m2       Blood Pressure from Last 3 Encounters:   18 146/77   18 157/73   18 136/70    Weight from Last 3 Encounters:   18 102 lb 8 oz (46.5 kg)   18 105 lb 9.6 oz (47.9 kg)   18 105 lb 8 oz (47.9 kg)              Today, you had the following     No orders found for display       Primary Care Provider Office Phone # Fax #    Mayra Janey PERRY Ferreira [facility] [phone]       [address] [place] [id number]        Goals        General    Medication 1 (pt-stated)     Notes - Note created  2014 11:39 AM by Clarice Stark, RN    I will get Flu shot in 2014 .    As of today's date 2014 goal is met at 0 - 25%.   Goal Status:  Active        Equal Access to Services     [facility] : Carola Garcia, maisha aguirre, yamel alexis. [facility] [phone].    ATENCIÓN: Si habla español, tiene a grover disposición servicios gratuitos de asistencia lingüística. Llame al [phone].    We comply with applicable federal civil rights laws and [place] laws. We do not discriminate on the " basis of race, color, national origin, age, disability, sex, sexual orientation, or gender identity.            Thank you!     Thank you for choosing [facility]  for your care. Our goal is always to provide you with excellent care. Hearing back from our patients is one way we can continue to improve our services. Please take a few minutes to complete the written survey that you may receive in the mail after your visit with us. Thank you!             Your Updated Medication List - Protect others around you: Learn how to safely use, store and throw away your medicines at www.disposemymeds.org.          This list is accurate as of 5/25/18  1:44 PM.  Always use your most recent med list.                   Brand Name Dispense Instructions for use Diagnosis    ACETAMINOPHEN PO      Take 1,000 mg by mouth every 8 hours as needed        albuterol 108 (90 Base) MCG/ACT Inhaler    PROAIR HFA/PROVENTIL HFA/VENTOLIN HFA     Inhale 2 puffs into the lungs every 6 hours as needed for shortness of breath / dyspnea or wheezing        BROVANA 15 MCG/2ML Nebu neb solution   Generic drug:  arformoterol      Take 15 mcg by nebulization 2 times daily        budesonide 0.5 MG/2ML neb solution    PULMICORT     Take 0.5 mg by nebulization 2 times daily        calcium carbonate-vitamin D 600-200 MG-UNIT Tabs      Take 1 tablet by mouth daily        dextromethorphan-guaiFENesin  MG per 12 hr tablet    MUCINEX DM     Take 2 tablets by mouth 2 times daily as needed for cough        ipratropium - albuterol 0.5 mg/2.5 mg/3 mL 0.5-2.5 (3) MG/3ML neb solution    DUONEB    360 mL    Take 1 vial (3 mLs) by nebulization every 6 hours    Pulmonary emphysema, unspecified emphysema type (H)       * order for DME     1 Device    Equipment being ordered: Nebulizer machine and supplies  [facility]  [phone] Fax: [phone]    SOB (shortness of breath), COPD exacerbation (H)       * order for DME     1 Device    Equipment  being ordered: Oxygen portable, O2 delivered at 3L and may wean O2 to 2.5 L as tolerated    COPD (chronic obstructive pulmonary disease) (H), Pneumonia       predniSONE 10 MG tablet    DELTASONE    60 tablet    6 tabs daily for 3days  Then 4 tabs daily for 3 days, then 3 tabs daily for 3 days, then 2 tabs daily for 3 days, then 1 tab daily for 3 days, then stop    Pulmonary emphysema, unspecified emphysema type (H)       VITAMIN D PO      Take 1 tablet by mouth daily Strength unknown        * Notice:  This list has 2 medication(s) that are the same as other medications prescribed for you. Read the directions carefully, and ask your doctor or other care provider to review them with you.

## 2018-05-25 NOTE — PROGRESS NOTES
SUBJECTIVE:   Kristine L Soulier is a 73 year old female who presents to clinic today for the following health issues:          Hospital Follow-up Visit:    Hospital/Nursing Home/IP Rehab Facility: [facility]  Date of Admission: 05/14/2018  Date of Discharge: 05/17/2018  Reason(s) for Admission: Acute on chronic hypoxic hypercapnic respiratory failure from obstructive airway disease             Problems taking medications regularly:  None       Medication changes since discharge: all finished with antibiotics        Problems adhering to non-medication therapy:  None    Summary of hospitalization:  [facility] discharge summary reviewed  Diagnostic Tests/Treatments reviewed.  Follow up needed: none  Other Healthcare Providers Involved in Patient s Care:         None  Update since discharge: stable.      Post Discharge Medication Reconciliation: discharge medications reconciled and changed, per note/orders (see AVS).  Plan of care communicated with patient     Coding guidelines for this visit:  Type of Medical   Decision Making Face-to-Face Visit       within 7 Days of discharge Face-to-Face Visit        within 14 days of discharge   Moderate Complexity [id number]   High Complexity 31529 [id number]          Patient is on baseline oxygen.  She is to be on 2 L of oxygen.  She was seen in the clinic and from the clinic she was sent to the emergency room.  She admits that when she was sent to the emergency room she did not fill     She has had pneumonia and her oxygen requirement was going up.  She was sent home on 3 L of oxygen.  This morning she noticed some short of breath.  She still using 3 L of oxygen.  She has no SPO2 monitor at home and she does not know what her oxygen saturation is.      She is completing prednisone taper.  She has completed her antibiotics.  She has no side effects from current medication.  She is scheduled to see her pulmonologist in August.  .    She denies any fever.   Any different than her baseline and she is currently feeling the same.       Problem list and histories reviewed & adjusted, as indicated.as documentedAdditional history: as documented    Reviewed and updated as needed this visit by clinical staff       Reviewed and updated as needed this visit by Provider    Social History     Social History     Marital status:      Spouse name: N/A     Number of children: 5     Years of education: 12     Occupational History     retired       Self     Social History Main Topics     Smoking status: Former Smoker     Packs/day: 0.25     Years: 20.00     Types: Cigarettes     Quit date: 1/29/2014     Smokeless tobacco: Former User     Alcohol use No     Drug use: No     Sexual activity: Not Currently     Other Topics Concern     Parent/Sibling W/ Cabg, Mi Or Angioplasty Before 65f 55m? No     Social History Narrative    Balanced Diet - tries to    Osteoporosis Prevention Measures - Dairy servings per day: 3-4    Regular Exercise -  No Describe no    Dental Exam - YES - Date: 10/09    Eye Exam - YES - Date: about a year ago    Self Breast Exam - she tries to    Abuse: Current or Past (Physical, Sexual or Emotional)- No    Do you feel safe in your environment - Yes    Guns stored in the home - No    Sunscreen used - No    Seatbelts used - Yes    Lipids -  YES - Date: years ago, was on lipitor for awhile    Glucose -  NO    Colon Cancer Screening - No, has not had a colonoscopy done before and she does not want to have one done    Hemoccults - NO    Pap Test -  YES - Date: years ago    Do you have any concerns about STD's -  No    Mammography - YES - Date: years    DEXA - NO    Immunizations reviewed and up to date - last td given years ago    Rowena Parmar ma  2009                 No Known Allergies  Patient Active Problem List   Diagnosis     CARDIOVASCULAR SCREENING; LDL GOAL LESS THAN 130     Seasonal allergic rhinitis     Advanced directives, counseling/discussion      "Enlarged lymph nodes     COPD (chronic obstructive pulmonary disease) (H)     Community acquired pneumonia     Health Care Home     Anxiety     HTN, goal below 140/80     Hilar adenopathy     Abdominal pain, generalized     RA (rheumatoid arthritis) (H)     Reviewed medications, social history and  past medical and surgical history.    Review of system: for general, respiratory, CVS, GI and psychiatry negative except for noted above.     EXAM:  /77 (Cuff Size: Adult Regular)  Pulse 116  Temp 98.6  F (37  C) (Oral)  Resp 20  Ht 5' 3\" (1.6 m)  Wt 102 lb 8 oz (46.5 kg)  SpO2 (!) 85%  BMI 18.16 kg/m2  Constitutional: healthy, alert and no distress   Psychiatric: mentation appears normal and affect normal/bright  Cardiovascular: RRR. Tachycardia present.   Respiratory: Using nasal cannula.  Her current SPO2 on 3 L of oxygen is 85% age.  I increased her  oxygen through nasal cannula to 5 L and her oxygen saturation is increasing up to 91% days.  I cut it down to 4 L and her oxygen saturation runs anywhere from 89-90% range.      ASSESSMENT / PLAN:  (R09.02) Hypoxia  (primary encounter diagnosis)  Comment:   Plan: She has a COPD and she is seeing a pulmonologist.  She was recently hospitalized with pneumonia and worsening of her hypoxemia.  She was sent home with 3 L of oxygen (her baseline is 2 L ).  Today she is here for hospital follow-up.  She has completed azithromycin course.  She is still on prednisone taper.  Her oxygen saturation is still pretty low but she is largely asymptomatic except for her baseline dyspnea.  I increased her oxygen to 4 L.  I am concerned about her increasing oxygen requirement and I recommended her to go to emergency room, she is quite reluctant to go to emergency room and she mentions she is feeling fine.  I explained to her about complications related to it and she understands that.  I finally reluctantly agreed to send her home with a 4 L of oxygen and she understands " complications including possible death.  We also called her pulmonologist as she should see them for follow-up sooner.  Unfortunately her appointment was switched from August to July (earlist available) but still not for next few months and I am going to see her back in 2 weeks to make sure her SPO2 is still not dropping further.  I strongly recommended her to go to emergency room if she constantly noticed some short of breath or dyspnea.  Recommended her to use duoneb more frequently. (currently using BID PRN)    (J43.9) Pulmonary emphysema, unspecified emphysema type (H)  Comment:    Plan:  See above.     (J18.9) Community acquired pneumonia, unspecified laterality  Comment:     Plan: see above.       There are no Patient Instructions on file for this visit.

## 2018-06-08 PROBLEM — Z99.81 OXYGEN DEPENDENT: Status: ACTIVE | Noted: 2018-01-01

## 2018-06-08 NOTE — MR AVS SNAPSHOT
"              After Visit Summary   2018    Kristine L Soulier    MRN: [id number]           Patient Information     Date Of Birth          1945        Visit Information        Provider Department      2018 10:40 AM Carlos Alberto Nelson MD [facility]        Today's Diagnoses     Anxiety    -  1       Follow-ups after your visit        Who to contact     If you have questions or need follow up information about today's clinic visit or your schedule please contact [facility] directly at [phone].  Normal or non-critical lab and imaging results will be communicated to you by nkf-pharmahart, letter or phone within 4 business days after the clinic has received the results. If you do not hear from us within 7 days, please contact the clinic through nkf-pharmahart or phone. If you have a critical or abnormal lab result, we will notify you by phone as soon as possible.  Submit refill requests through OpenText or call your pharmacy and they will forward the refill request to us. Please allow 3 business days for your refill to be completed.          Additional Information About Your Visit        MyChart Information     OpenText lets you send messages to your doctor, view your test results, renew your prescriptions, schedule appointments and more. To sign up, go to [email or web address] . Click on \"Log in\" on the left side of the screen, which will take you to the Welcome page. Then click on \"Sign up Now\" on the right side of the page.     You will be asked to enter the access code listed below, as well as some personal information. Please follow the directions to create your username and password.     Your access code is: [email or web address]  Expires: 2018 11:20 AM     Your access code will  in 90 days. If you need help or a new code, please call your [facility] or [phone].        Care EveryWhere ID     This is your Care EveryWhere ID. This could be used by other organizations " to access your [place] medical records  [id number]        Your Vitals Were     Pulse Temperature Respirations Pulse Oximetry BMI (Body Mass Index)       96 99  F (37.2  C) (Oral) 17 95% 18.16 kg/m2        Blood Pressure from Last 3 Encounters:   06/08/18 142/68   05/25/18 146/77   05/17/18 157/73    Weight from Last 3 Encounters:   06/08/18 102 lb 8 oz (46.5 kg)   05/25/18 102 lb 8 oz (46.5 kg)   05/17/18 105 lb 9.6 oz (47.9 kg)              Today, you had the following     No orders found for display         Today's Medication Changes          These changes are accurate as of 6/8/18 11:01 AM.  If you have any questions, ask your nurse or doctor.               Start taking these medicines.        Dose/Directions    hydrOXYzine 10 MG tablet   Commonly known as:  ATARAX   Used for:  Anxiety   Started by:  Carlos Alberto Nelson MD        Dose:  10-20 mg   Take 1-2 tablets (10-20 mg) by mouth 3 times daily as needed for anxiety   Quantity:  60 tablet   Refills:  0         Stop taking these medicines if you haven't already. Please contact your care team if you have questions.     predniSONE 10 MG tablet   Commonly known as:  DELTASONE   Stopped by:  Carlos Alberto Nelson MD                Where to get your medicines      These medications were sent to [facility] [address]  [address] [facility] [address]     Phone:  [phone]     hydrOXYzine 10 MG tablet                Primary Care Provider Office Phone # Fax #    Mayra PERRY Perdomo [facility] [phone]       [address]  [facility] [address]        Goals        General    Medication 1 (pt-stated)     Notes - Note created  9/11/2014 11:39 AM by Clarice Stark RN    I will get Flu shot in October 2014 .    As of today's date 9/11/2014 goal is met at 0 - 25%.   Goal Status:  Active        Equal Access to Services     [facility] JALEEL AH: Hadmaisha Nunez qaybta  yamel umanzorean paul ah. [facility] [phone].    ATENCIÓN: Si osmani granda, tiene a grover disposición servicios gratuitos de asistencia lingüística. Shady al [phone].    We comply with applicable federal civil rights laws and [place] laws. We do not discriminate on the basis of race, color, national origin, age, disability, sex, sexual orientation, or gender identity.            Thank you!     Thank you for choosing [facility]  for your care. Our goal is always to provide you with excellent care. Hearing back from our patients is one way we can continue to improve our services. Please take a few minutes to complete the written survey that you may receive in the mail after your visit with us. Thank you!             Your Updated Medication List - Protect others around you: Learn how to safely use, store and throw away your medicines at www.disposemymeds.org.          This list is accurate as of 6/8/18 11:01 AM.  Always use your most recent med list.                   Brand Name Dispense Instructions for use Diagnosis    ACETAMINOPHEN PO      Take 1,000 mg by mouth every 8 hours as needed        albuterol 108 (90 Base) MCG/ACT Inhaler    PROAIR HFA/PROVENTIL HFA/VENTOLIN HFA     Inhale 2 puffs into the lungs every 6 hours as needed for shortness of breath / dyspnea or wheezing        BROVANA 15 MCG/2ML Nebu neb solution   Generic drug:  arformoterol      Take 15 mcg by nebulization 2 times daily        budesonide 0.5 MG/2ML neb solution    PULMICORT     Take 0.5 mg by nebulization 2 times daily        calcium carbonate-vitamin D 600-200 MG-UNIT Tabs      Take 1 tablet by mouth daily        dextromethorphan-guaiFENesin  MG per 12 hr tablet    MUCINEX DM     Take 2 tablets by mouth 2 times daily as needed for cough        hydrOXYzine 10 MG tablet    ATARAX    60 tablet    Take 1-2 tablets (10-20 mg) by mouth 3 times daily as needed for anxiety    Anxiety        ipratropium - albuterol 0.5 mg/2.5 mg/3 mL 0.5-2.5 (3) MG/3ML neb solution    DUONEB    360 mL    Take 1 vial (3 mLs) by nebulization every 6 hours    Pulmonary emphysema, unspecified emphysema type (H)       * order for DME     1 Device    Equipment being ordered: Nebulizer machine and supplies  [employer]  [phone] Fax: [phone]    SOB (shortness of breath), COPD exacerbation (H)       * order for DME     1 Device    Equipment being ordered: Oxygen portable, O2 delivered at 3L and may wean O2 to 2.5 L as tolerated    COPD (chronic obstructive pulmonary disease) (H), Pneumonia       VITAMIN D PO      Take 1 tablet by mouth daily Strength unknown        * Notice:  This list has 2 medication(s) that are the same as other medications prescribed for you. Read the directions carefully, and ask your doctor or other care provider to review them with you.

## 2018-06-08 NOTE — PROGRESS NOTES
SUBJECTIVE:   Kristine L Soulier is a 73 year old female who presents to clinic today for the following health issues:       duoneb 4 times per day. Sometime 3 times per day albuterol along with it.     No short of breath.     On 4 L oxygen.       She is feeling significantly better.  Patient was hospitalized with pneumonia and her baseline oxygen was increased from 2.5-3 L.  When I saw her in the clinic her oxygen saturation was still low and she declined to go to the ER but agreed to increase her oxygen to 4 L.     She was using as needed benzos and that is that is the reason she initially was seen in the clinic.         Problem list and histories reviewed & adjusted, as indicated.  Additional history: as documented    Labs reviewed in EPIC    Reviewed and updated as needed this visit by clinical staff       Reviewed and updated as needed this visit by Provider           Social History     Social History     Marital status:      Spouse name: N/A     Number of children: 5     Years of education: 12     Occupational History     retired       Self     Social History Main Topics     Smoking status: Former Smoker     Packs/day: 0.25     Years: 20.00     Types: Cigarettes     Quit date: 1/29/2014     Smokeless tobacco: Former User     Alcohol use No     Drug use: No     Sexual activity: Not Currently     Other Topics Concern     Parent/Sibling W/ Cabg, Mi Or Angioplasty Before 65f 55m? No     Social History Narrative    Balanced Diet - tries to    Osteoporosis Prevention Measures - Dairy servings per day: 3-4    Regular Exercise -  No Describe no    Dental Exam - YES - Date: 10/09    Eye Exam - YES - Date: about a year ago    Self Breast Exam - she tries to    Abuse: Current or Past (Physical, Sexual or Emotional)- No    Do you feel safe in your environment - Yes    Guns stored in the home - No    Sunscreen used - No    Seatbelts used - Yes    Lipids -  YES - Date: years ago, was on lipitor for awhile     Glucose -  NO    Colon Cancer Screening - No, has not had a colonoscopy done before and she does not want to have one done    Hemoccults - NO    Pap Test -  YES - Date: years ago    Do you have any concerns about STD's -  No    Mammography - YES - Date: years    DEXA - NO    Immunizations reviewed and up to date - last td given years ago    Rowena Parmar ma  2009                 No Known Allergies  Patient Active Problem List   Diagnosis     CARDIOVASCULAR SCREENING; LDL GOAL LESS THAN 130     Seasonal allergic rhinitis     Advanced directives, counseling/discussion     Enlarged lymph nodes     COPD (chronic obstructive pulmonary disease) (H)     Community acquired pneumonia     Health Care Home     Anxiety     HTN, goal below 140/80     Hilar adenopathy     Abdominal pain, generalized     RA (rheumatoid arthritis) (H)     Oxygen dependent     Reviewed medications, social history and  past medical and surgical history.    Review of system: for general, respiratory, CVS, GI and psychiatry negative except for noted above.     EXAM:  /68 (BP Location: Right arm, Patient Position: Chair, Cuff Size: Adult Small)  Pulse 96  Temp 99  F (37.2  C) (Oral)  Resp 17  Wt 102 lb 8 oz (46.5 kg)  SpO2 95%  BMI 18.16 kg/m2  Constitutional: thin, alert and no distress   Psychiatric: mentation appears normal and affect normal/bright  Cardiovascular: RRR. No murmurs,  Respiratory: negative, Lungs clear. No crackles or wheezing. No tachypnea. Using oxygen through nasal canula      ASSESSMENT / PLAN:  (F41.9) Anxiety  (primary encounter diagnosis)  Comment: Benzos may not be the most ideal due to her age and concern of respiratory depression with severe COPD.  We discussed about trial of low-dose of Atarax and how she does.  We discussed about side effects she agreed.  Plan: hydrOXYzine (ATARAX) 10 MG tablet,        (J43.9) Pulmonary emphysema, unspecified emphysema type (H)  Comment:   Plan: As mentioned above she has had  pneumonia that required hospitalization which she has recovered very well and her baseline oxygen's requirement was increased from 2.5 to 3 L when she was discharged and when I saw her for follow up I increased it to 4 L due to hypoxia.  Today I titrated it down it to 2 L and her oxygen saturation dropped down to 90.  Due to her COPD her oxygen saturation should be above 88.  I think she may be okay with 2.5 L of oxygen but until she sees pulmonologist next month she will stick with the 3 L oxygen.  So she will cut down her oxygen from 4 L to 3 L supplement.    (Z99.81) Oxygen dependent  Comment:    Plan:  See above.

## 2018-06-17 PROBLEM — B44.9: Status: ACTIVE | Noted: 2018-01-01

## 2018-06-17 PROBLEM — A31.0 MYCOBACTERIUM AVIUM INFECTION (H): Status: ACTIVE | Noted: 2018-01-01

## 2018-06-17 PROBLEM — I21.4 NSTEMI (NON-ST ELEVATED MYOCARDIAL INFARCTION) (H): Status: ACTIVE | Noted: 2018-01-01

## 2018-06-17 NOTE — H&P
[facility]    History and Physical  Hospitalist       Date of Admission:  6/17/2018    Assessment & Plan   Kristine L Soulier is a 73 year old female with history of  Copd on home oxygen presents to emergency room  With complaints of  Of 3 days of chest pain, troponin's elevated in emergency room  .    Principal Problem:    NSTEMI (non-ST elevated myocardial infarction) (H)    She has central chest pain off and on ,worse with activity , troponin  Elevated , currently pain free , her chest pain associated with diaphoresis. She had a recent CT chest last month indicating severe coronary artery disease .last stress in 2014.    Cycle troponin , start on heparin drip, cautious with betablocker's due to her severe copd, aspirin started,  check lipid panel     Cardiology consulted     Nitro, dilaudid ordered    Advanced directives, counseling/discussion    dnr     Anxiety    Continue home medications-hydroxyzine    HTN, goal below 140/80    Not on any medications     RA (rheumatoid arthritis) (H)    Prn tylenol , not on any other medications     Positive sputum culture for Aspergillus (H) as per history     Mycobacterium avium infection (H) history     End stage COPD (H)    Does not appear to be in exacerbation     Might need CT chest to evaluate the worsening infiltrate mentioned in the chest x ray  But patient  Has history of  Aspergillus and atypical mycobacterial infection in the past    Follow up with Dr Glover outpatient, appointment coming up    Continue her home inhalers and nebs   Grade 1 diastolic heart failure     As per echocardiogram 5/18    # Pain Assessment:  Current Pain Score 5/17/2018   Patient currently in pain? denies   Pain score (0-10) -   Pain descriptors -   CPOT pain score -   - Anay is experiencing pain due to RA. Pain management was discussed and the plan was created in a collaborative fashion.  Anay's response to the current recommendations: engaged  - Please see the plan  for pain management as documented above        DVT Prophylaxis: heparin drip   Code Status: DNR    Disposition: Expected discharge in 2 days     Aisha Young MD    Primary Care Physician   Carlos Alberto Nelson MD    Chief Complaint   Chest heaviness multiple episodes since Friday     History is obtained from the patient    History of Present Illness   Kristine L Soulier is a 73 year old female with history of COPD on home O2, former smoker, rheumatoid arthritis, history of a typical mycobacterial infection presents to the emergency department with left-sided/central chest pain/chest tightness that started last Friday.  It has been coming and going, worse with activity and associated with diaphoresis .  Her chest pain got better with nitro, she is currently pain-free.  She had a last stress test in 2014 and that was a stress echo.  The stress echo did not indicate any signs of ischemia.  She again had an echocardiogram done last month which indicated grade 1 diastolic dysfunction but the patient is not aware of that.  She was admitted here for COPD exacerbation and may and at that time the CT chest indicated severe coronary atherosclerosis.  She did not have any fever but she indicated having chills.  She does have chronic cough from her COPD and no recent change in that noted.  She uses 4 L of oxygen 24/7 and that  have not been going up.  In the emergency room EKG did not reveal any distinct ischemia however the troponins were elevated chest x-ray did not show any pneumonia but the there was worsening of interstitial infiltrates bilaterally.  She was started on heparin and aspirin was given in the ED.      Past Medical History    I have reviewed this patient's medical history and updated it with pertinent information if needed.   Past Medical History:   Diagnosis Date     Allergic rhinitis      Anxiety      Arthritis      COPD (chronic obstructive pulmonary disease) (H)      Former smoker     quit 01/2014      HTN, goal below 140/80        Past Surgical History   I have reviewed this patient's surgical history and updated it with pertinent information if needed.  Past Surgical History:   Procedure Laterality Date     BRONCHOSCOPY FLEXIBLE  4/16/2014    Procedure: bronchoscopy;  Surgeon: Ken Thomson MD;  Location:  GI     BRONCHOSCOPY FLEXIBLE AND RIGID  4/17/2014    Procedure: bronchoscopy;  Surgeon: Ken Thomson MD;  Location:  GI     BRONCHOSCOPY FLEXIBLE AND RIGID  4/18/2014    Procedure: bronchoscopy;  Surgeon: Ken Thomson MD;  Location:  GI     HC COLONOSCOPY THRU STOMA, DIAGNOSTIC  2/26/10    with Colon Rectal spec. at [facility], repeat 10 yrs.       Prior to Admission Medications   Prior to Admission Medications   Prescriptions Last Dose Informant Patient Reported? Taking?   ACETAMINOPHEN PO 6/17/2018 at AM Self Yes Yes   Sig: Take 1,000 mg by mouth 2 times daily as needed    BROVANA 15 MCG/2ML NEBU 6/17/2018 at AM Self Yes Yes   Sig: Take 15 mcg by nebulization 2 times daily    Cholecalciferol (VITAMIN D PO) 6/17/2018 at AM Self Yes Yes   Sig: Take 1 tablet by mouth daily Strength unknown   ORDER FOR DME   No No   Sig: Equipment being ordered: Nebulizer machine and supplies    [facility]   [phone]  Fax: [phone]   ORDER FOR DME   No No   Sig: Equipment being ordered: Oxygen portable, O2 delivered at 3L and may wean O2 to 2.5 L as tolerated   albuterol (PROAIR HFA/PROVENTIL HFA/VENTOLIN HFA) 108 (90 Base) MCG/ACT Inhaler 6/17/2018 at AM Self Yes Yes   Sig: Inhale 2 puffs into the lungs every 6 hours as needed for shortness of breath / dyspnea or wheezing   budesonide (PULMICORT) 0.5 MG/2ML nebulizer solution 6/17/2018 at AM Self Yes Yes   Sig: Take 0.5 mg by nebulization 2 times daily    calcium carbonate-vitamin D 600-200 MG-UNIT TABS 6/17/2018 at AM Self Yes Yes   Sig: Take 1 tablet by mouth daily    dextromethorphan-guaiFENesin (MUCINEX DM)  MG  per 12 hr tablet 6/17/2018 at AM Self Yes Yes   Sig: Take 1 tablet by mouth every 12 hours    hydrOXYzine (ATARAX) 10 MG tablet  at PRN Self No No   Sig: Take 1-2 tablets (10-20 mg) by mouth 3 times daily as needed for anxiety   ipratropium - albuterol 0.5 mg/2.5 mg/3 mL (DUONEB) 0.5-2.5 (3) MG/3ML neb solution 6/17/2018 at AM Self No Yes   Sig: Take 1 vial (3 mLs) by nebulization every 6 hours      Facility-Administered Medications: None     Allergies   No Known Allergies    Social History   I have reviewed this patient's social history and updated it with pertinent information if needed. Kristine L Soulier  reports that she quit smoking about 4 years ago. Her smoking use included Cigarettes. She has a 5.00 pack-year smoking history. She has quit using smokeless tobacco. She reports that she does not drink alcohol or use illicit drugs.    Family History   I have reviewed this patient's family history and updated it with pertinent information if needed.   Family History   Problem Relation Age of Onset     Asthma Mother      DIABETES Mother      Arthritis Mother      Circulatory Mother      blood clots, varicose veins     Genitourinary Problems Mother      kidney problems     Respiratory Mother      emphysema     Alcohol/Drug Father      alcohol     Arthritis Maternal Aunt      Arthritis Other      self       Review of Systems   The 10 point Review of Systems is negative other than noted in the HPI.    Physical Exam   Temp: 98.2  F (36.8  C) Temp src: Oral BP: 141/71   Heart Rate: 112   SpO2: 96 % O2 Device: Nasal cannula Oxygen Delivery: 4 LPM  Vital Signs with Ranges  Temp:  [98.2  F (36.8  C)] 98.2  F (36.8  C)  Heart Rate:  [112] 112  BP: (141-187)/(68-71) 141/71  SpO2:  [91 %-97 %] 96 %  104 lbs 9.6 oz    Constitutional: Awake, alert, cooperative, no apparent distress.thin built /emaciated  Eyes: Conjunctiva and pupils examined and normal.  HEENT: Moist mucous membranes, normal dentition.  Respiratory: bilateral  basilar crackles+, no wheezing noted   Cardiovascular: Regular rate and rhythm, normal S1 and S2, and no murmur noted.  GI: Soft, non-distended, non-tender, normal bowel sounds.  Lymph/Hematologic: No anterior cervical or supraclavicular adenopathy.  Skin: No rashes, no cyanosis, no edema.  Musculoskeletal:RA changes note bilateral upper extremity  , erythema or tenderness.  Neurologic: Cranial nerves 2-12 intact, normal strength and sensation.  Psychiatric: Alert, oriented to person, place and time, no obvious anxiety or depression.    Data   Data reviewed today:  I personally reviewed ekg   Recent Labs  Lab 06/17/18  1531   WBC 9.4   HGB 10.8*   *         POTASSIUM 3.9   CHLORIDE 96   CO2 35*   BUN 17   CR 0.52   ANIONGAP 6   ISADORA 8.7   *   TROPI 0.092*       Imaging:  Recent Results (from the past 24 hour(s))   XR Chest 2 Views    Narrative    CHEST TWO VIEWS  6/17/2018 3:45 PM     HISTORY: Shortness of breath    COMPARISON: 5/14/2018      Impression    IMPRESSION: Chronic interstitial infiltrates in both lungs, slightly  more confluent at the right base at this point. The possibility of a  mass should be considered if this does not resolve after medical  therapy. Normal heart size and pulmonary vascularity.    MICH FERNANDEZ MD

## 2018-06-17 NOTE — ED NOTES
"[facility]  ED Nurse Handoff Report    ED Chief complaint: Chills (Recently at hospital with pneumonia and started to sweat with chills yesterday)      ED Diagnosis:   Final diagnoses:   None       Code Status: Hospital MD at bedside    Allergies: No Known Allergies    Activity level - Baseline/Home:  Independent    Activity Level - Current:   Stand with Assist     Needed?: No    Isolation: No  Infection: Not Applicable    Bariatric?: No    Vital Signs:   Vitals:    06/17/18 1507 06/17/18 1534 06/17/18 1537   BP: 187/68 141/71    Temp: 98.2  F (36.8  C)     TempSrc: Oral     SpO2: 91% 97% 97%   Weight: 47.4 kg (104 lb 9.6 oz)     Height: 1.6 m (5' 3\")         Cardiac Rhythm: ,        Pain level:      Is this patient confused?: No   Suicidality: Screened negative    Patient Report: Initial Complaint: SOB, cough   Focused Assessment: Pt presents from home with SOB and cough that began yesterday. Pt has hx of COPD and pulmonary emphysema. Pt always on home 02, pt states since she was discharged from hospital in May she has been on 4 liters NC. Pt states productive cough with clear sputum. Pt reports some chest discomfort as well. CXR showing possible mass but no PNA. Trop is mildly elevated on 0.092. Pt to receive ASA in ED.  Tests Performed: Blood work, CXR, ekg  Abnormal Results: Trop 0.092, hgb 10.8  Treatments provided: see MAR    Family Comments: None present, neighbor brought pt and then left    OBS brochure/video discussed/provided to patient: No    ED Medications: Medications - No data to display    Drips infusing?:  No    For the majority of the shift this patient was Green.   Interventions performed were meds, repo.    Severe Sepsis OR Septic Shock Diagnosis Present: No      ED NURSE PHONE NUMBER: *[phone]         "

## 2018-06-17 NOTE — PHARMACY-ADMISSION MEDICATION HISTORY
Admission medication history interview status for the 6/17/2018  admission is complete. See EPIC admission navigator for prior to admission medications     Medication history source reliability:Moderate    Actions taken by pharmacist (provider contacted, etc):None     Additional medication history information not noted on PTA med list :    Pt reported not having to use the hydroxyzine in over a year, but did just  a new prescription for it and will use it as needed.     Medication reconciliation/reorder completed by provider prior to medication history? No    Time spent in this activity: 15 minutes    Prior to Admission medications    Medication Sig Last Dose Taking? Auth Provider   ACETAMINOPHEN PO Take 1,000 mg by mouth 2 times daily as needed  6/17/2018 at AM Yes Reported, Patient   albuterol (PROAIR HFA/PROVENTIL HFA/VENTOLIN HFA) 108 (90 Base) MCG/ACT Inhaler Inhale 2 puffs into the lungs every 6 hours as needed for shortness of breath / dyspnea or wheezing 6/17/2018 at AM Yes Carlos Alberto Nelson MD BROVANA 15 MCG/2ML NEBU Take 15 mcg by nebulization 2 times daily  6/17/2018 at AM Yes Reported, Patient   budesonide (PULMICORT) 0.5 MG/2ML nebulizer solution Take 0.5 mg by nebulization 2 times daily  6/17/2018 at AM Yes Reported, Patient   calcium carbonate-vitamin D 600-200 MG-UNIT TABS Take 1 tablet by mouth daily  6/17/2018 at AM Yes Mariah Worley NP   Cholecalciferol (VITAMIN D PO) Take 1 tablet by mouth daily Strength unknown 6/17/2018 at AM Yes Reported, Patient   dextromethorphan-guaiFENesin (MUCINEX DM)  MG per 12 hr tablet Take 1 tablet by mouth every 12 hours  6/17/2018 at AM Yes Unknown, Entered By History   ipratropium - albuterol 0.5 mg/2.5 mg/3 mL (DUONEB) 0.5-2.5 (3) MG/3ML neb solution Take 1 vial (3 mLs) by nebulization every 6 hours 6/17/2018 at AM Yes Mayela Louie MD   hydrOXYzine (ATARAX) 10 MG tablet Take 1-2 tablets (10-20 mg) by mouth 3 times daily as needed for  anxiety  at PRN  Carlos Alberto Nelson MD   ORDER FOR DME Equipment being ordered: Oxygen portable, O2 delivered at 3L and may wean O2 to 2.5 L as tolerated   Kathia Orta APRN CNP   ORDER FOR DME Equipment being ordered: Nebulizer machine and supplies    [facility]   [phone]  Fax: [phone]   Kathia Orta APRN CNP

## 2018-06-17 NOTE — PLAN OF CARE
RECEIVING UNIT ED HANDOFF REVIEW    ED Nurse Handoff Report was reviewed by: Renetta Bales on June 17, 2018 at 5:17 PM

## 2018-06-17 NOTE — ED NOTES
Pt transported to [facility] with RN and EDT on transport monitor

## 2018-06-17 NOTE — PROVIDER NOTIFICATION
"Dr. Young paged: \"could you clarify bedrest and NPO admission orders?\"    Response received: Pt can start on cardiac diet, to be NPO at midnight and up with assist.  "

## 2018-06-17 NOTE — IP AVS SNAPSHOT
[facility]    [address] [place] [address]., [address]    [place] [address]    Phone:  [phone]                                       After Visit Summary   6/17/2018    Kristine L Soulier    MRN: [id number]           After Visit Summary Signature Page     I have received my discharge instructions, and my questions have been answered. I have discussed any challenges I see with this plan with the nurse or doctor.    ..........................................................................................................................................  Patient/Patient Representative Signature      ..........................................................................................................................................  Patient Representative Print Name and Relationship to Patient    ..................................................               ................................................  Date                                            Time    ..........................................................................................................................................  Reviewed by Signature/Title    ...................................................              ..............................................  Date                                                            Time

## 2018-06-17 NOTE — IP AVS SNAPSHOT
MRN:[id number]                      After Visit Summary   6/17/2018    Kristine L Soulier    MRN: [id number]           Thank you!     Thank you for choosing [facility] for your care. Our goal is always to provide you with excellent care. Hearing back from our patients is one way we can continue to improve our services. Please take a few minutes to complete the written survey that you may receive in the mail after you visit with us. Thank you!        Patient Information     Date Of Birth          1945        Designated Caregiver       Most Recent Value    Caregiver    Will someone help with your care after discharge? no      About your hospital stay     You were admitted on:  June 17, 2018 You last received care in the:  [facility]    You were discharged on:  June 18, 2018        Reason for your hospital stay       Patient admitted for chest discomfort and diaphoresis. A cardiology workup was done and no ischemia was found.                  Who to Call     For medical emergencies, please call 911.  For non-urgent questions about your medical care, please call your primary care provider or clinic, [phone]          Attending Provider     Provider Specialty    Domingo Montgomery MD Emergency Medicine    Aisha Young MD Internal Medicine    Alvaro Olsen MD Internal Medicine       Primary Care Provider Office Phone # Fax #    Carlos Alberto Nelson [phone]      After Care Instructions     Activity       Your activity upon discharge: activity as tolerated            Diet       Follow this diet upon discharge: Orders Placed This Encounter      Low Saturated Fat Na <2400 mg            Monitor and record       blood pressure daily, document results and give to primary care provider on next visit.                  Follow-up Appointments     Follow-up and recommended labs and tests        Follow up with primary care provider, Carlos Alberto Kent  "MD Omar, within 2-4 weeks for hospital follow- up. Recheck blood pressure and initiate antihypertensive if warranted. Recheck lipid panel and start modification/statin if LDL remains >100.                  Pending Results     Date and Time Order Name Status Description    2018 0909 ECHO STRESS DOBUTAMINE WITH OPTISON In process             Statement of Approval     Ordered          18 5955  I have reviewed and agree with all the recommendations and orders detailed in this document.  EFFECTIVE NOW     Approved and electronically signed by:  Jacki Hernandez PA-C             Admission Information     Date & Time Provider Department Dept. Phone    2018 Alvaro Olsen MD [facility] [phone]      Your Vitals Were     Blood Pressure Pulse Temperature Respirations Height Weight    137/68 (BP Location: Left arm) 79 98.6  F (37  C) (Oral) 18 1.6 m (5' 3\") 45.2 kg (99 lb 11.2 oz)    Pulse Oximetry BMI (Body Mass Index)                96% 17.66 kg/m2          [facility] Information     [employer] lets you send messages to your doctor, view your test results, renew your prescriptions, schedule appointments and more. To sign up, go to [email or web address] . Click on \"Log in\" on the left side of the screen, which will take you to the Welcome page. Then click on \"Sign up Now\" on the right side of the page.     You will be asked to enter the access code listed below, as well as some personal information. Please follow the directions to create your username and password.     Your access code is: [email or web address]  Expires: 2018 11:20 AM     Your access code will  in 90 days. If you need help or a new code, please call your [facility] clinic or [phone].        Care EveryWhere ID     This is your Care EveryWhere ID. This could be used by other organizations to access your [facility] medical records  [id number]        Equal Access to Services     ANDRAE MARMOLEJO AH: Hadii " simi Garcia, waaxda luqadaha, qaybta kaalmada amrita, yamel abadin hayaawei walshean shaunaustin ladevorawei surekha. So [facility] [phone].    ATENCIÓN: Si habla español, tiene a grover disposición servicios gratuitos de asistencia lingüística. Llame al [phone].    We comply with applicable federal civil rights laws and [place] laws. We do not discriminate on the basis of race, color, national origin, age, disability, sex, sexual orientation, or gender identity.               Review of your medicines      CONTINUE these medicines which have NOT CHANGED        Dose / Directions    ACETAMINOPHEN PO        Dose:  1000 mg   Take 1,000 mg by mouth 2 times daily as needed   Refills:  0       albuterol 108 (90 Base) MCG/ACT Inhaler   Commonly known as:  PROAIR HFA/PROVENTIL HFA/VENTOLIN HFA        Dose:  2 puff   Inhale 2 puffs into the lungs every 6 hours as needed for shortness of breath / dyspnea or wheezing   Refills:  0       BROVANA 15 MCG/2ML Nebu neb solution   Generic drug:  arformoterol        Dose:  15 mcg   Take 15 mcg by nebulization 2 times daily   Refills:  0       budesonide 0.5 MG/2ML neb solution   Commonly known as:  PULMICORT        Dose:  0.5 mg   Take 0.5 mg by nebulization 2 times daily   Refills:  0       calcium carbonate-vitamin D 600-200 MG-UNIT Tabs        Dose:  1 tablet   Take 1 tablet by mouth daily   Refills:  0       dextromethorphan-guaiFENesin  MG per 12 hr tablet   Commonly known as:  MUCINEX DM        Dose:  1 tablet   Take 1 tablet by mouth every 12 hours   Refills:  0       hydrOXYzine 10 MG tablet   Commonly known as:  ATARAX   Used for:  Anxiety        Dose:  10-20 mg   Take 1-2 tablets (10-20 mg) by mouth 3 times daily as needed for anxiety   Quantity:  60 tablet   Refills:  0       ipratropium - albuterol 0.5 mg/2.5 mg/3 mL 0.5-2.5 (3) MG/3ML neb solution   Commonly known as:  DUONEB   Used for:  Pulmonary emphysema, unspecified emphysema type (H)        Dose:  3 mL   Take 1  vial (3 mLs) by nebulization every 6 hours   Quantity:  360 mL   Refills:  0       * order for DME   Used for:  SOB (shortness of breath), COPD exacerbation (H)        Equipment being ordered: Nebulizer machine and supplies  [facility]  [phone] Fax: [phone]   Quantity:  1 Device   Refills:  0       * order for DME   Used for:  COPD (chronic obstructive pulmonary disease) (H), Pneumonia        Equipment being ordered: Oxygen portable, O2 delivered at 3L and may wean O2 to 2.5 L as tolerated   Quantity:  1 Device   Refills:  0       VITAMIN D PO        Dose:  1 tablet   Take 1 tablet by mouth daily Strength unknown   Refills:  0       * Notice:  This list has 2 medication(s) that are the same as other medications prescribed for you. Read the directions carefully, and ask your doctor or other care provider to review them with you.             Protect others around you: Learn how to safely use, store and throw away your medicines at www.disposemymeds.org.             Medication List: This is a list of all your medications and when to take them. Check marks below indicate your daily home schedule. Keep this list as a reference.      Medications           Morning Afternoon Evening Bedtime As Needed    ACETAMINOPHEN PO   Take 1,000 mg by mouth 2 times daily as needed                                   albuterol 108 (90 Base) MCG/ACT Inhaler   Commonly known as:  PROAIR HFA/PROVENTIL HFA/VENTOLIN HFA   Inhale 2 puffs into the lungs every 6 hours as needed for shortness of breath / dyspnea or wheezing                                   BROVANA 15 MCG/2ML Nebu neb solution   Take 15 mcg by nebulization 2 times daily   Last time this was given:  15 mcg on 6/18/2018  8:08 AM   Generic drug:  arformoterol   Next Dose Due:  6/18 8pm                                      budesonide 0.5 MG/2ML neb solution   Commonly known as:  PULMICORT   Take 0.5 mg by nebulization 2 times daily   Last time this was given:  0.5 mg on  6/18/2018  8:08 AM   Next Dose Due:  6/18 8pm                                      calcium carbonate-vitamin D 600-200 MG-UNIT Tabs   Take 1 tablet by mouth daily   Next Dose Due:  6/19 8am                                   dextromethorphan-guaiFENesin  MG per 12 hr tablet   Commonly known as:  MUCINEX DM   Take 1 tablet by mouth every 12 hours   Last time this was given:  1 tablet on 6/18/2018  8:43 AM   Next Dose Due:  6/18 8pm                                      hydrOXYzine 10 MG tablet   Commonly known as:  ATARAX   Take 1-2 tablets (10-20 mg) by mouth 3 times daily as needed for anxiety                                   ipratropium - albuterol 0.5 mg/2.5 mg/3 mL 0.5-2.5 (3) MG/3ML neb solution   Commonly known as:  DUONEB   Take 1 vial (3 mLs) by nebulization every 6 hours   Last time this was given:  3 mLs on 6/18/2018 12:52 PM   Next Dose Due:  6/18 7pm                                         * order for DME   Equipment being ordered: Nebulizer machine and supplies  [facility]  [phone] Fax: [phone]                                * order for DME   Equipment being ordered: Oxygen portable, O2 delivered at 3L and may wean O2 to 2.5 L as tolerated                                VITAMIN D PO   Take 1 tablet by mouth daily Strength unknown   Next Dose Due:  6/19 8am                                   * Notice:  This list has 2 medication(s) that are the same as other medications prescribed for you. Read the directions carefully, and ask your doctor or other care provider to review them with you.

## 2018-06-17 NOTE — ED PROVIDER NOTES
History     Chief Complaint:  Cough, chest pain    HPI   Kristine L Soulier is a 73 year old female who presents with concern of chest tightness that she's had intermittently for the last several days. Not provoked by exertion or breathing. She has history of COPD and denies having chest pain associated with that. She feels her COPD symptoms are stable and she has chronic dyspnea with that but no changes as of late and also she has chronic mucoid cough. At times she is felt chilled with diaphoresis with her chest discomfort. No hemoptysis. No history of pulmonary embolism or coronary artery intervention. She was admitted to this hospital about a month ago for COPD exacerbation and respiratory failure and recovered well from that back to her physical baseline. Denies any leg edema or fever. No other complaints.    Allergies:  No Known Allergies     Medications:    Brpvana   Budesonide  Hydroxyzine  Albuterol  Aceto    ACETAMINOPHEN PO   albuterol (PROAIR HFA/PROVENTIL HFA/VENTOLIN HFA) 108 (90 Base) MCG/ACT Inhaler   BROVANA 15 MCG/2ML NEBU   budesonide (PULMICORT) 0.5 MG/2ML nebulizer solution   calcium carbonate-vitamin D 600-200 MG-UNIT TABS   Cholecalciferol (VITAMIN D PO)   dextromethorphan-guaiFENesin (MUCINEX DM)  MG per 12 hr tablet   ipratropium - albuterol 0.5 mg/2.5 mg/3 mL (DUONEB) 0.5-2.5 (3) MG/3ML neb solution   hydrOXYzine (ATARAX) 10 MG tablet   ORDER FOR DME   ORDER FOR DME       Problem List:    Patient Active Problem List    Diagnosis Date Noted     Positive sputum culture for Aspergillus (H) as per history  06/17/2018     Priority: Medium     Mycobacterium avium infection (H) history  06/17/2018     Priority: Medium     NSTEMI (non-ST elevated myocardial infarction) (H) 06/17/2018     Priority: Medium     Oxygen dependent 06/08/2018     Priority: Medium     RA (rheumatoid arthritis) (H) 05/07/2015     Priority: Medium     Abdominal pain, generalized 07/17/2014     Priority: Medium      HTN, goal below 140/80      Priority: Medium     Anxiety      Priority: Medium     Hilar adenopathy 05/21/2014     Priority: Medium     Health Care Home 04/28/2014     Priority: Medium     State Tier Level:  Tier 2  Status not active in Care coordination       See Letters for [facility] Care Plan           Community acquired pneumonia 04/14/2014     Priority: Medium     End stage COPD (H)      Priority: Medium     Enlarged lymph nodes 01/29/2014     Priority: Medium     Problem list name updated by automated process. Provider to review       Advanced directives, counseling/discussion 10/16/2013     Priority: Medium     Advance Care Planning:   Receipt of ACP document:  Received: Health Care Directive which was witnessed or notarized on 10-10-13.  Document not previously scanned.  Validation form completed and sent with document to be scanned.  Code Status does not reflects choices in most recent ACP document.  Confirmed/documented designated decision maker(s). See permanent comments section of demographics in clinical tab. View document(s) and details by clicking on code status.   Added by Sol Fontaine on 10/16/2013.             Seasonal allergic rhinitis 05/11/2010     Priority: Medium     CARDIOVASCULAR SCREENING; LDL GOAL LESS THAN 130 05/09/2010     Priority: Medium        Past Medical History:    Past Medical History:   Diagnosis Date     Allergic rhinitis      Anxiety      Arthritis      COPD (chronic obstructive pulmonary disease) (H)      Former smoker      HTN, goal below 140/80        Past Surgical History:    Past Surgical History:   Procedure Laterality Date     BRONCHOSCOPY FLEXIBLE  4/16/2014    Procedure: bronchoscopy;  Surgeon: Ken Thomson MD;  Location:  GI     BRONCHOSCOPY FLEXIBLE AND RIGID  4/17/2014    Procedure: bronchoscopy;  Surgeon: Ken Thomson MD;  Location:  GI     BRONCHOSCOPY FLEXIBLE AND RIGID  4/18/2014    Procedure: bronchoscopy;  Surgeon: Ken Thomson  "MD Do;  Location:  GI     HC COLONOSCOPY THRU STOMA, DIAGNOSTIC  2/26/10    with Colon Rectal spec. at [facility], repeat 10 yrs.       Family History:    Family History   Problem Relation Age of Onset     Asthma Mother      DIABETES Mother      Arthritis Mother      Circulatory Mother      blood clots, varicose veins     Genitourinary Problems Mother      kidney problems     Respiratory Mother      emphysema     Alcohol/Drug Father      alcohol     Arthritis Maternal Aunt      Arthritis Other      self       Social History:  Marital Status:   [4]  Social History   Substance Use Topics     Smoking status: Former Smoker     Packs/day: 0.25     Years: 20.00     Types: Cigarettes     Quit date: 1/29/2014     Smokeless tobacco: Former User     Alcohol use No        Review of Systems  All systems negative or per HPI.    Physical Exam   First Vitals:  BP: 187/68  Heart Rate: 112  Temp: 98.2  F (36.8  C)  Height: 160 cm (5' 3\")  Weight: 47.4 kg (104 lb 9.6 oz)  SpO2: 91 %      Physical Exam  SKIN:  Warm, dry.  HEMATOLOGIC/IMMUNOLOGIC/LYMPHATIC:  No pallor.  No edema.  HENT:  No JVD.    EYES:  Conjunctivae normal.  CARDIOVASCULAR:  Regular rate and rhythm.  No murmur.  No rub.  RESPIRATORY:  No respiratory distress, breath sounds equal and normal.  GASTROINTESTINAL:  Soft, nontender abdomen.  MUSCULOSKELETAL:  Thin body habitus.  NEUROLOGIC:  Alert, conversant.  PSYCHIATRIC:  Normal mood.    Emergency Department Course     ECG:  Indication: chills  Completed at 1540.  Read at 1553.   Normal sinus rhythm  Possible left atrial enlargement  Septal infarct, age undetermined  Abnormal ECG  Rate 89 bpm. CA interval 156. QRS duration 88. QT/QTc 336/408. P-R-T axes 82 72 47.    Imaging:  Radiology findings were communicated with the patient who voiced understanding of the findings.  XR Chest 2 views:   Chronic interstitial infiltrates in both lungs, slightly  more confluent at the right base at this point. The " possibility of a  mass should be considered if this does not resolve after medical  therapy. Normal heart size and pulmonary vascularity. As per radiology.     Laboratory:  CBC: WBC: 9.4, HGB: 10.8, PLT: 395    BMP: Glucose 208, Carbon Dioxide 35, o/w WNL (Creatinine: 0.52)    1531 Troponin: 0.092    Interventions:  1644 Heparin loading dose 2800 units IV Bolus  1644 Heparin infusion 25,000 units at 550 units/hr IV Infusion  1644 Aspirin 325 mg PO      Emergency Department Course:  Nursing notes and vitals reviewed.     1516 I performed an exam of the patient as documented above.     IV inserted. Medicine administered as documented above.     Blood drawn. This was sent to the lab for further testing, results above.    EKG obtained in the ED, see results above.     The patient was sent for a chest XR while in the emergency department, findings above.     1702 I rechecked the patient and discussed the results of his workup thus far.       Impression & Plan      Medical Decision Making:  This patient presents with intermittent chest tightness which seems unprovoked and occurring at rest. Raise concern for angina. EKG did not reveal any distinct ischemia however the troponin returned elevated. Other testing relatively unrevealing. I consulted Dr. Young for admission and per her review the medical record the patient did have evidence of coronary artery atherosclerosis as noted on CT scan last month. So certainly the patient may have symptoms secondary to that. Given her history and in this context we opted to heparinize the patient. Aspirin administered as well.    Diagnosis:    ICD-10-CM   1. Chest pain, unspecified type R07.9   2. Chronic obstructive pulmonary disease, unspecified COPD type (H) J44.9   3. Productive cough R05       Disposition:  Admitted to telemetry bed under service of Dr. Young.    Discharge Medications:      6/17/2018    EMERGENCY DEPARTMENT       Domingo Montgomery MD  06/17/18 0258

## 2018-06-18 PROBLEM — R07.9 CHEST PAIN: Status: ACTIVE | Noted: 2018-01-01

## 2018-06-18 NOTE — DISCHARGE SUMMARY
[facility]    Discharge Summary  Hospitalist    Date of Admission:  6/17/2018  Date of Discharge:  6/18/2018  Discharging Provider: Jacki Hernandez PA-C    Discharge Diagnoses   Elevated troponin in setting of probable panic attack  Chest pain, resolved  Anxiety  End Stage COPD  Hypertension  Coronary atherosclerosis on CT scan  Rheumatoid arthritis  Recent PNA (aspergillus and mycobacterium avium)    History of Present Illness   Kristine L Soulier is a 73 year old female with history of COPD on home oxygen was admitted on 6/17/18 with complaints of of 3 days of chest pain, troponin's mildly elevated in emergency room. For full HPI see admission H&P from Dr. Aisha Young dated yesterday, 6/17/18.    Hospital Course   Kristine L Soulier was admitted on 6/17/2018.  The following problems were addressed during her hospitalization:      Feeling well, would like to go home today. No current exacerbation of usual SOB.     # Troponinemia: Mild elevation per Cardiology in setting of probable panic attack. Admitted with central chest pain off and on, worse with activity. Pain free today. On admission her chest pain was associated with diaphoresis. She had a recent CT chest last month indicating severe coronary artery disease on last stress in 2014. Troponin 0.092, 0.094, and 0.092. Started on heparin drip, cautious with betablocker's due to her severe copd, aspirin started.  and not previously on statin. Plan to follow Cardiac diet as able and recheck lipid panel as outpatient for possible starting of statin. Stress Echocardiogram today showed Normal LV function and wall motion. Stress EF 65-70%. Normal stress echo with  No evidence of stress-induced ischemia. Ok to discharge per Cardiology.      # Anxiety: Continue home medications-hydroxyzine.      # HTN, goal below 140/80: Not on any medications PTA. Will need to check BP at home and follow up with PCP Dr. Carlos Alberto Nelson to discuss starting an  antihypertensive if warranted.      # Rheumatoid arthritis: Prn tylenol, not on any other medications    # Recent Hx PNA May 2018: Positive sputum culture for Aspergillus and Mycobacterium avium infection per review. Pt monitoring symptoms on discharge and will return to the ED if she spikes a fever, develops     # End stage COPD on home O2:   Does not appear to be in exacerbation. Might need CT chest to evaluate the worsening infiltrate mentioned in the chest x ray  But patient  Has history of  Aspergillus and atypical mycobacterial infection in the past and results likely related to these. Follow up with Dr Glover outpatient, appointment coming up. Continue her home inhalers and nebs. Return to the ER if you spike a fever, develop chills, worsening SOB, or other s/s of pneumonia. WBC up to 12.4 but suspect related to stress response rather than infection.      # Grade 1 diastolic heart failure: As per echocardiogram 5/18    # Discharge Pain Plan: - Patient currently has NO PAIN and is not being prescribed pain medications on discharge.    This patient was discussed with Dr. Alvaro Olsen of the Hospitalist Service who agrees with current plans as outlined above.    Jacki Hernandez PA-C  Hospitalist Physician Assistant  Pager: [phone]      Significant Results and Procedures   - CXR 6/17: Chronic interstitial infiltrates bilaterally, slightly more confluent at right base. Normal heart size and pulmonary vascularity.  -Stress Echocardiogram 6/18: Normal LV function and wall motion. Stress EF 65-70%. Normal stress echo with  No evidence of stress-induced ischemia.    Code Status   DNR       Primary Care Physician   Carlos Alberto Nelson MD    Physical Exam   Temp: 98.6  F (37  C) Temp src: Oral BP: 137/68 Pulse: 79 Heart Rate: 84 Resp: 18 SpO2: 96 % O2 Device: Nasal cannula Oxygen Delivery: 4 LPM  Vitals:    06/17/18 1507 06/18/18 0535 06/18/18 0536   Weight: 47.4 kg (104 lb 9.6 oz) 45.2 kg (99 lb 11.2 oz) 45.2  kg (99 lb 11.2 oz)     Vital Signs with Ranges  Temp:  [98.1  F (36.7  C)-98.6  F (37  C)] 98.6  F (37  C)  Pulse:  [79] 79  Heart Rate:  [] 84  Resp:  [18] 18  BP: (127-162)/(68-73) 137/68  SpO2:  [94 %-100 %] 96 %  I/O last 3 completed shifts:  In: 806 [P.O.:120; I.V.:686]  Out: 1000 [Urine:1000]    Constitutional: Awake, alert, cooperative, no apparent distress.  Eyes: Conjunctiva and pupils examined and normal.  Respiratory: Decreased course breath sounds diffusely. End expiratory wheezing at RLL. 4 L baseline O2 NC.  Cardiovascular: Regular rate and rhythm, normal S1 and S2, and no murmur noted.  GI: Soft, non-distended, non-tender, normal bowel sounds.  Skin: No rashes, no cyanosis, no edema.  Musculoskeletal: No joint swelling, erythema or tenderness.  Neurologic: Cranial nerves 2-12 intact, normal strength and sensation.  Psychiatric: Alert, oriented to person, place and time, no obvious anxiety or depression.  Constitutional:  Awake, alert, cooperative, no apparent distress    Discharge Disposition   Discharged to home  Condition at discharge: Stable    Consultations This Hospital Stay   PHARMACY TO DOSE HEPARIN  CARDIOLOGY IP CONSULT  RESPIRATORY CARE IP CONSULT  PHARMACY TO DOSE HEPARIN    Time Spent on this Encounter   I, Jacki Hernandez, personally saw the patient today and spent greater than 30 minutes discharging this patient.    Discharge Orders     Reason for your hospital stay   Patient admitted for chest discomfort and diaphoresis. A cardiology workup was done and no ischemia was found.     Activity   Your activity upon discharge: activity as tolerated     Monitor and record   blood pressure daily, document results and give to primary care provider on next visit.     Follow-up and recommended labs and tests    Follow up with primary care provider, Carlos Alberto Nelson MD, within 2-4 weeks for hospital follow- up. Recheck blood pressure and initiate antihypertensive if warranted.  Recheck lipid panel and start modification/statin if LDL remains >100.     DNR (Do Not Resuscitate)     Diet   Follow this diet upon discharge: Orders Placed This Encounter     Low Saturated Fat Na <2400 mg       Discharge Medications   Current Discharge Medication List      CONTINUE these medications which have NOT CHANGED    Details   ACETAMINOPHEN PO Take 1,000 mg by mouth 2 times daily as needed       albuterol (PROAIR HFA/PROVENTIL HFA/VENTOLIN HFA) 108 (90 Base) MCG/ACT Inhaler Inhale 2 puffs into the lungs every 6 hours as needed for shortness of breath / dyspnea or wheezing  Refills: 0      BROVANA 15 MCG/2ML NEBU Take 15 mcg by nebulization 2 times daily       budesonide (PULMICORT) 0.5 MG/2ML nebulizer solution Take 0.5 mg by nebulization 2 times daily       calcium carbonate-vitamin D 600-200 MG-UNIT TABS Take 1 tablet by mouth daily   Refills: 0      Cholecalciferol (VITAMIN D PO) Take 1 tablet by mouth daily Strength unknown      dextromethorphan-guaiFENesin (MUCINEX DM)  MG per 12 hr tablet Take 1 tablet by mouth every 12 hours       ipratropium - albuterol 0.5 mg/2.5 mg/3 mL (DUONEB) 0.5-2.5 (3) MG/3ML neb solution Take 1 vial (3 mLs) by nebulization every 6 hours  Qty: 360 mL, Refills: 0    Associated Diagnoses: Pulmonary emphysema, unspecified emphysema type (H)      hydrOXYzine (ATARAX) 10 MG tablet Take 1-2 tablets (10-20 mg) by mouth 3 times daily as needed for anxiety  Qty: 60 tablet, Refills: 0    Associated Diagnoses: Anxiety      !! ORDER FOR DME Equipment being ordered: Oxygen portable, O2 delivered at 3L and may wean O2 to 2.5 L as tolerated  Qty: 1 Device, Refills: 0    Associated Diagnoses: COPD (chronic obstructive pulmonary disease) (H); Pneumonia      !! ORDER FOR DME Equipment being ordered: Nebulizer machine and supplies    [facility]   [phone]  Fax: [phone]  Qty: 1 Device, Refills: 0    Associated Diagnoses: SOB (shortness of breath); COPD exacerbation (H)        !! - Potential duplicate medications found. Please discuss with provider.        Allergies   No Known Allergies  Data   Most Recent 3 CBC's:  Recent Labs   Lab Test  06/18/18   0651  06/17/18   1531  05/16/18   0720  05/15/18   0740   WBC  12.4*  9.4  7.8  12.0*   HGB  9.8*  10.8*   --   10.0*   MCV  102*  103*   --   100   PLT  345  395   --   321      Most Recent 3 BMP's:  Recent Labs   Lab Test  06/18/18   0651  06/17/18   1531  05/16/18   0720   NA  138  137  137   POTASSIUM  4.2  3.9  4.6   CHLORIDE  98  96  96   CO2  37*  35*  36*   BUN  9  17  18   CR  0.36*  0.52  0.35*   ANIONGAP  3  6  5   ISADORA  8.0*  8.7  8.8   GLC  89  208*  125*     Most Recent 2 LFT's:  Recent Labs   Lab Test  05/16/18   0720  12/07/15   0935   AST  18  23   ALT  19  26   ALKPHOS  91  101   BILITOTAL  0.2  0.5     Most Recent INR's and Anticoagulation Dosing History:  Anticoagulation Dose History     There is no flowsheet data to display.        Most Recent 3 Troponin's:  Recent Labs   Lab Test  06/17/18   2310  06/17/18   1753  06/17/18   1531   TROPI  0.092*  0.094*  0.092*     Most Recent Cholesterol Panel:  Recent Labs   Lab Test  06/18/18   0651   CHOL  175   LDL  107*   HDL  49*   TRIG  94     Most Recent 6 Bacteria Isolates From Any Culture (See EPIC Reports for Culture Details):  Recent Labs   Lab Test  05/15/18   0907  05/14/18   1310  05/14/18   1220  01/07/15   1532  10/10/14   0925  04/16/14   1840   CULT  Light growth  Normal charis    No growth  No growth  No growth  >100,000 colonies/mL Citrobacter freundii complex*  Mycobacterium avium intracellulare complex Identification by MALDI-TOF Test   developed and characteristics determined by [facility]. See Compliance   Statement B at [email or web address]. Negative for Mycobacterium tuberculosis complex   by DNA probe.  Assayed at [email or web address] [address]  Critical Value called to and read back by DR PADGETT 05/05/14@1612,BR  *  Aspergillus fumigatus  isolated  No additional fungi cultured after 1 week incubation  Unable to hold 4 weeks due to overgrowth of fungus  *  Unable to complete culture, fungal contamination  No Legionella species isolated to date  refer to Fungus culture for identification of fungus.    Normal respiratory charis  Light growth Aspergillus fumigatus  *  Charge credited  Incorrectly ordered by PCU/Clinic  BRONCH CULTURED ORDERED ON THIS ACCESSION.       Most Recent TSH, T4 and A1c Labs:  Recent Labs   Lab Test  07/08/15   0903   TSH  1.15     Results for orders placed or performed during the hospital encounter of 06/17/18   XR Chest 2 Views    Narrative    CHEST TWO VIEWS  6/17/2018 3:45 PM     HISTORY: Shortness of breath    COMPARISON: 5/14/2018      Impression    IMPRESSION: Chronic interstitial infiltrates in both lungs, slightly  more confluent at the right base at this point. The possibility of a  mass should be considered if this does not resolve after medical  therapy. Normal heart size and pulmonary vascularity.    MICH FERNANDEZ MD

## 2018-06-18 NOTE — PLAN OF CARE
Problem: Patient Care Overview  Goal: Plan of Care/Patient Progress Review  Outcome: Improving  Pt AO, VSS on 4L O2 (uses 4L at baseline). Denies chest pain, endorses some SOB.  Tele sinus dysrhythmia. Cards saw this am. Had dobutamine stress echo. Heparin gtt at 750 unit/hr. Recheck 10a tomorrow am. Scheduled nebs. LS coarse. Up SBA. Cardiac diet. Continue to monitor.

## 2018-06-18 NOTE — PROGRESS NOTES
Pt tolerated medication infusion well.  No C/O chest pain.   B/P stable. No increase in shortness of breath.  Medication stopped and echo pictures obtained. Pt denies chest pain.  Will return to inpatient area when Heart rate under 100.

## 2018-06-18 NOTE — CONSULTS
Consult Date:  06/18/2018      REASON FOR CONSULTATION:  Mild elevated troponin and chest tightness.      HISTORY OF PRESENT ILLNESS:  The patient is a 73-year-old female with history of end-stage COPD on home O2, previous tobacco use, rheumatoid arthritis, anxiety and hypertension who presented to the emergency department yesterday with complaints of chills and diaphoresis.  She was admitted to the hospital approximately a month ago with pneumonia.  At that time she had similar symptoms.  Yesterday when she noted those symptoms she was concerned that she might be having pneumonia again.  She tells me she became very anxious with worsening shortness of breath.  It was in that setting that she noted a have mild chest tightness.  The chest tightness went away on its own.  She tells me she never took nitroglycerin.  She also denies any exertional component or chest tightness.  She does report having these episodes when she is short of breath and becomes quite anxious.  She denies any palpitations, presyncope, or syncope.  The patient denies any other heart failure symptoms including orthopnea, PND or significant lower extremity edema.      She did have a stress test in 2014 which showed ST depressions with exercise, but the imaging portion was read as showing no evidence of inducible ischemia.  Recent CT of the chest (noncoronary CT) showed coronary artery atherosclerosis.  Her LV function is preserved per her most recent echocardiogram earlier last month.  She also has known moderate pulmonary hypertension.      PAST MEDICAL HISTORY:   1.  COPD.   2.  Rheumatoid arthritis.   3.  Anxiety.   4.  Hypertension.      ALLERGIES:  NO KNOWN DRUG ALLERGIES.      SOCIAL HISTORY:  She is a former smoker who quit approximately 4 years ago.  Denies alcohol use.      FAMILY HISTORY:  Negative for premature coronary artery disease.      CURRENT MEDICATIONS:  Aspirin 81 mg daily, Pulmicort nebs 0.5 mg b.i.d., albuterol inhaler as  needed, heparin infusion.      PHYSICAL EXAMINATION:   VITAL SIGNS:  Blood pressure is 146/72, pulse is 79.   GENERAL:  She is resting, appears to be in no acute distress.   NECK:  Jugular venous pressure is mildly distended.   LUNGS:  Diffuse expiratory wheezing, otherwise clear.   HEART:  Normal S1, S2, 1/6 systolic ejection murmur.   ABDOMEN:  Soft, nontender.   EXTREMITIES:  Warm, no edema, cyanosis or clubbing.   SKIN:  No rashes or lesions.   NEUROLOGIC:  No focal deficits.   HEENT:  Oropharynx is clear.  Mucous membranes are dry.      IMPRESSION, REPORT AND PLAN:   1.  Mild elevated troponin in the setting of probable panic attack.   2.  End-stage chronic obstructive pulmonary disease on home O2.   3.  Coronary atherosclerosis on CT scan.   4.  Hypertension.      The patient reports an episode of chest tightness in the setting of anxiety yesterday.  She had similar episodes in the past.  No exertional nature of her symptoms.  The troponin elevation is quite flat and does not fit with acute coronary syndrome.      A recent CT scan showed coronary atherosclerosis so she likely has underlying coronary artery disease, although the obstructive nature of her disease is unclear at this point.      I did discuss potential diagnostic options moving forward.  She is not interested in invasive angiogram at this point as she thinks her symptoms are probably related to anxiety, which might be true.  I did recommend a stress test to rule out significant underlying ischemia.  She is agreeable to proceed with coronary angiogram if the stress test shows significant ischemia.      Given her advanced COPD and O2 dependence, a pharmacological nuclear stress test is contraindicated at this point.  We will obtain dobutamine stress echocardiogram this morning.  If the echo shows no significant ischemia, then she will likely go home later today.             I appreciate the opportunity to participate in this patient's care.          PRASANNA VEGA MD             D: 2018   T: 2018   MT: OLINDA      Name:     SOULIER, KRISTINE   MRN:      [id number]        Account:       [id number]   :      1945           Consult Date:  2018      Document: [id number]       cc: Carlos Alberto Nelson MD

## 2018-06-18 NOTE — UTILIZATION REVIEW
"  Admission Status; Secondary Review Determination         Under the authority of the Utilization Management Committee, the utilization review process indicated a secondary review on the above patient.  The review outcome is based on review of the medical records, discussions with staff, and applying clinical experience noted on the date of the review.        ()      Inpatient Status Appropriate - This patient's medical care is consistent with medical management for inpatient care and reasonable inpatient medical practice.      (X) Observation Status Appropriate - This patient does not meet hospital inpatient criteria and is placed in observation status. If this patient's primary payer is Medicare and was admitted as an inpatient, Condition Code 44 should be used and patient status changed to \"observation\".   () Admission Status NOT Appropriate - This patient's medical care is not consistent with medical management for Inpatient or Observation Status.          RATIONALE FOR DETERMINATION     73-year-old female with history of end-stage COPD on home O2, previous tobacco use, rheumatoid arthritis, anxiety and hypertension who presented to the emergency department yesterday with complaints of chills, chest pain, and diaphoresis.  Troponin was elevated at 0.92.  She has a history of recent CT coronary angiography showing significant atherosclerosis.  EKG did not show evidence of acute ischemia. She was started on IV heparin.  Troponins have been stable.  Cardiology was consulted. Stress testing is pending. I would recommend Observation status.  I discussed this case with Jacki Hernandez PA-C    The severity of illness, intensity of service provided, expected LOS and risk for adverse outcome make the care complex, high risk and appropriate for hospital admission.        The information on this document is developed by the utilization review team in order for the business office to ensure compliance.  This only denotes " the appropriateness of proper admission status and does not reflect the quality of care rendered.         The definitions of Inpatient Status and Observation Status used in making the determination above are those provided in the CMS Coverage Manual, Chapter 1 and Chapter 6, section 70.4.      Sincerely,     Maxi Montenegro MD  Physician Advisor  Utilization Review/ Case Management  [facility].

## 2018-06-18 NOTE — PROGRESS NOTES
Pre procedure plan of care reviewed with pt prior to exam.  All questions answered and pt appears to accept and understand.  Pt is anxious pre procedure.  IV fluids and heparin gtt stopped for IV access to infuse Dobutamine.

## 2018-06-18 NOTE — PROGRESS NOTES
Pt dc via wheelchair with staff to [facility]. AO, VSS on RA. Denies chest pain. Still with some SOB. Uses home O2. All questions answered, paperwork given. No Rx. Will f/u with PCP.

## 2018-06-18 NOTE — PLAN OF CARE
Problem: Patient Care Overview  Goal: Plan of Care/Patient Progress Review  Patient is A&O, VSS, Tele: NSR. Chronically on 4L of oxygen, saturations >90%. Lungs are diminished and coarse, patient reporting breathing at rest is baseline, dyspnea on exertion slightly. Heparin drip infusing, 750 units/hr. Up SBA. NPO @ midnight per order, cardiology consult. Will continue to monitor.

## 2018-06-18 NOTE — PLAN OF CARE
Problem: Patient Care Overview  Goal: Plan of Care/Patient Progress Review  Pt A&Ox4, VSS on 4LPM NC. Pt has baseline COPD and 4LPM is baseline per pt. Denies pain. Congested, Nonproductive cough. LS coarse, rhonchi, crackles. Up SBA. Heparin gtt 5cc/hr, recheck @ 2300. IVF - NS @ 50. Tele: NSR. Plan to be NPO @ midnight.

## 2018-06-19 NOTE — TELEPHONE ENCOUNTER
"Hospital/TCU/ED for chronic condition Discharge Protocol    \"Hi, my name is Santy Hernandez, a registered nurse, and I am calling from [facility].  I am calling to follow up and see how things are going for you after your recent emergency visit/hospital/TCU stay.\"    Tell me how you are doing now that you are home?\" doing fine.      Discharge Instructions    \"Let's review your discharge instructions.  What is/are the follow-up recommendations?  Pt. Response: pt should see pcp in 2-4 weeks.  Pt will call clinic to make this appt.  She just got home yest.  No other needs from clinic at this time.    \"Has an appointment with your primary care provider been scheduled?\"   Pt will call clinic back to make appt.    \"When you see the provider, I would recommend that you bring your medications with you.\"    Medications    \"Tell me what changed about your medicines when you discharged?\"    Changes to chronic meds?    0-1    \"What questions do you have about your medications?\"    None     New diagnoses of heart failure, COPD, diabetes, or MI?    No              Medication reconciliation completed? No. No new meds.  Was MTM referral placed (*Make sure to put transitions as reason for referral)?   No    Call Summary    \"What questions or concerns do you have about your recent visit and your follow-up care?\"     none    \"If you have questions or things don't continue to improve, we encourage you contact us through the main clinic number (give number).  Even if the clinic is not open, triage nurses are available 24/7 to help you.     We would like you to know that our clinic has extended hours (provide information).  We also have urgent care (provide details on closest location and hours/contact info)\"      \"Thank you for your time and take care!\"  LOLIS West             "

## 2018-07-06 PROBLEM — R09.02 HYPOXIA: Status: ACTIVE | Noted: 2018-01-01

## 2018-07-06 NOTE — LETTER
Transition Communication Hand-off for Care Transitions to Next Level of Care Provider    Name: Kristine L Soulier  : 1945  MRN #: [id number]  Primary Care Provider: Carlos Alberto Nelson MD     Primary Clinic: [address] [facility] [address]     Reason for Hospitalization:  SOB (shortness of breath) [R06.02]  Respiratory distress [R06.00]  NSTEMI (non-ST elevated myocardial infarction) (H) [I21.4]  Bilateral pulmonary infiltrates on CXR [R91.8]  Admit Date/Time: 2018 12:14 PM  Discharge Date:   Payor Source: Payor: HU[employer]NA / Plan: HU[employer]NA MEDICARE ADVANTAGE / Product Type: Medicare /     Readmission Assessment Measure (JORDI) Risk Score/category: Elevated  Reason for Communication Hand-off Referral: Admission diagnoses: COPD  Fragility  Avoidable readmission within 30 days    Discharge Plan:  Discharge Plan:       Most Recent Value    Concerns To Be Addressed all concerns addressed in this encounter           Concern for non-adherence with plan of care: NO     Discharge Needs Assessment:  Needs       Most Recent Value    Anticipated Changes Related to Illness none    Transportation Available family or friend will provide, other (see comments)    # of Referrals Placed by [facility], Durable Medical Equipment (DME)      Already enrolled in Tele-monitoring program and name of program:  No  Follow-up specialty is recommended: Yes    Follow-up plan:  Future Appointments  Date Time Provider Department Center   2018 9:40 AM Carlos Alberto Nelosn MD FP [facility]       Any outstanding tests or procedures:    Procedures     Future Labs/Procedures    Oxygen Adult     Comments:    New Home Oxygen Order 4 liter(s) by nasal cannula at rest and 8L by Nasal Cannula with activity with use of portable tank. Expected treatment length is indefinite (99 months).. Test on conserving device as applicable.    Patients who qualify for home O2 coverage under the CMS guidelines require ABG tests or O2 sat readings  obtained closest to, but no earlier than 2 days prior to the discharge, as evidence of the need for home oxygen therapy. Testing must be performed while patient is in the chronic stable state. See notes for O2 sats.    I certify that this patient, Kristine L Soulier has been under my care and that I, or a nurse practitioner or physician's assistant working with me, had a face-to-face encounter that meets the face-to-face encounter requirements with this patient on 7/12/2018. The patient, Kristine L Soulier was evaluated or treated in whole, or in part, for the following medical condition, which necessitates the use of the ordered oxygen. Treatment Diagnosis: End stage COPD    Attending Provider: Alice Mortensen  Physician signature: See electronic signature associated with these discharge orders  Date of Order: July 12, 2018          Referrals     Future Labs/Procedures    Home care nursing referral     Comments:    RN skilled nursing visit. RN to assess vital signs and weight and respiratory and cardiac status.    Your provider has ordered home care nursing services. If you have not been contacted within 2 days of your discharge please call the inpatient department phone number at [phone] .    Home Care PT Referral for Hospital Discharge     Comments:    PT to eval and treat    Your provider has ordered home care - physical therapy. If you have not been contacted within 2 days of your discharge please call the department phone number listed on the top of this document.            Key Recommendations: Patient was admitted with  acute on chronic COPD exacerbation  --She was treated with IV steroid initially and has been transitioned to PO Prednisone tapering by 10mg q4 days until reaches 10mg daily which will be continued at that dose until her pulm follow up,duonebs and inhalers is to be continued.  Patient is frail and weak.  Daughter will help,education on COPD completed unsure how much she comprehends as  she was focused on home oxygen requirement which has increased since admission. She is being dcd with home care services.        Parish Sandoval RN CC    AVS/Discharge Summary is the source of truth; this is a helpful guide for improved communication of patient story

## 2018-07-06 NOTE — PHARMACY-ADMISSION MEDICATION HISTORY
Admission medication history interview status for the 7/6/2018  admission is complete. See EPIC admission navigator for prior to admission medications     Medication history source reliability:Good    Actions taken by pharmacist (provider contacted, etc):interviewed patient and reviewed EPIC outpt notes     Additional medication history information not noted on PTA med list :pt states she only does duoneb twice a day, although prescribed 4 times/day.     Medication reconciliation/reorder completed by provider prior to medication history? No    Time spent in this activity: 15 minutes    Prior to Admission medications    Medication Sig Last Dose Taking? Auth Provider   albuterol (PROAIR HFA/PROVENTIL HFA/VENTOLIN HFA) 108 (90 Base) MCG/ACT Inhaler Inhale 2 puffs into the lungs every 6 hours as needed for shortness of breath / dyspnea or wheezing Past Month at Unknown time Yes Carlos Alberto Nelson MD   BROVANA 15 MCG/2ML NEBU Take 15 mcg by nebulization 2 times daily  7/6/2018 at Unknown time Yes Reported, Patient   budesonide (PULMICORT) 0.5 MG/2ML nebulizer solution Take 0.5 mg by nebulization 2 times daily  7/6/2018 at Unknown time Yes Reported, Patient   calcium carbonate-vitamin D 600-200 MG-UNIT TABS Take 1 tablet by mouth daily  7/6/2018 at Unknown time Yes Mariah Worley NP   Cholecalciferol (VITAMIN D PO) Take 1 tablet by mouth daily Strength unknown 7/6/2018 at Unknown time Yes Reported, Patient   dextromethorphan-guaiFENesin (MUCINEX DM)  MG per 12 hr tablet Take 1 tablet by mouth every 12 hours  7/6/2018 at Unknown time Yes Unknown, Entered By History   hydrOXYzine (ATARAX) 10 MG tablet Take 1-2 tablets (10-20 mg) by mouth 3 times daily as needed for anxiety 7/6/2018 at Unknown time Yes Carlos Alberto Nelson MD   ipratropium - albuterol 0.5 mg/2.5 mg/3 mL (DUONEB) 0.5-2.5 (3) MG/3ML neb solution Take 1 vial (3 mLs) by nebulization every 6 hours  Patient taking differently: Take 3 mLs by  nebulization 2 times daily  7/5/2018 at Unknown time Yes Mayela Louie MD   ACETAMINOPHEN PO Take 1,000 mg by mouth 2 times daily as needed    Reported, Patient   ORDER FOR DME Equipment being ordered: Oxygen portable, O2 delivered at 3L and may wean O2 to 2.5 L as tolerated   Kathia Orta APRN CNP   ORDER FOR DME Equipment being ordered: Nebulizer machine and supplies    [facility]   [phone]  Fax: [phone]   Kathia Orta APRN CNP

## 2018-07-06 NOTE — MR AVS SNAPSHOT
"              After Visit Summary   7/6/2018    Kristine L Soulier    MRN: [id number]           Patient Information     Date Of Birth          1945        Visit Information        Provider Department      7/6/2018 9:20 AM Carlos Alberto Nelson MD [facility]        Today's Diagnoses     End stage COPD (H)    -  1    Pneumonia of both lower lobes due to infectious organism           Follow-ups after your visit        Who to contact     If you have questions or need follow up information about today's clinic visit or your schedule please contact [facility] directly at [phone].  Normal or non-critical lab and imaging results will be communicated to you by MyChart, letter or phone within 4 business days after the clinic has received the results. If you do not hear from us within 7 days, please contact the clinic through MyChart or phone. If you have a critical or abnormal lab result, we will notify you by phone as soon as possible.  Submit refill requests through [employer] or call your pharmacy and they will forward the refill request to us. Please allow 3 business days for your refill to be completed.          Additional Information About Your Visit        Care EveryWhere ID     This is your Care EveryWhere ID. This could be used by other organizations to access your [facility] medical records  [id number]        Your Vitals Were     Pulse Temperature Respirations Height Pulse Oximetry       124 97.9  F (36.6  C) (Oral) 24 5' 3\" (1.6 m) 73%        Blood Pressure from Last 3 Encounters:   07/06/18 130/57   06/18/18 137/68   06/08/18 142/68    Weight from Last 3 Encounters:   06/18/18 99 lb 11.2 oz (45.2 kg)   06/08/18 102 lb 8 oz (46.5 kg)   05/25/18 102 lb 8 oz (46.5 kg)               Primary Care Provider Office Phone # Fax #    Carlos Alberto Nelson [phone] [address] [place] [address]        Goals        General    Medication 1 " (pt-stated)     Notes - Note created  9/11/2014 11:39 AM by Clarice Stark, RN    I will get Flu shot in October 2014 .    As of today's date 9/11/2014 goal is met at 0 - 25%.   Goal Status:  Active        Equal Access to Services     ANDRAE MARMOLEJO : Hadii aad ku hadchantello Soalvinoali, waaxda luqadaha, qaybta kaalmada adeegyada, yamel abadin hayaawei walshean vail humberto irby. So [facility] [phone].    ATENCIÓN: Si habla español, tiene a grover disposición servicios gratuitos de asistencia lingüística. Llame al [phone].    We comply with applicable federal civil rights laws and [place] laws. We do not discriminate on the basis of race, color, national origin, age, disability, sex, sexual orientation, or gender identity.            Thank you!     Thank you for choosing [facility]  for your care. Our goal is always to provide you with excellent care. Hearing back from our patients is one way we can continue to improve our services. Please take a few minutes to complete the written survey that you may receive in the mail after your visit with us. Thank you!             Your Updated Medication List - Protect others around you: Learn how to safely use, store and throw away your medicines at www.disposemymeds.org.          This list is accurate as of 7/6/18 11:38 AM.  Always use your most recent med list.                   Brand Name Dispense Instructions for use Diagnosis    ACETAMINOPHEN PO      Take 1,000 mg by mouth 2 times daily as needed        albuterol 108 (90 Base) MCG/ACT Inhaler    PROAIR HFA/PROVENTIL HFA/VENTOLIN HFA     Inhale 2 puffs into the lungs every 6 hours as needed for shortness of breath / dyspnea or wheezing        BROVANA 15 MCG/2ML Nebu neb solution   Generic drug:  arformoterol      Take 15 mcg by nebulization 2 times daily        budesonide 0.5 MG/2ML neb solution    PULMICORT     Take 0.5 mg by nebulization 2 times daily        calcium carbonate-vitamin D 600-200 MG-UNIT Tabs      Take 1  tablet by mouth daily        dextromethorphan-guaiFENesin  MG per 12 hr tablet    MUCINEX DM     Take 1 tablet by mouth every 12 hours        hydrOXYzine 10 MG tablet    ATARAX    60 tablet    Take 1-2 tablets (10-20 mg) by mouth 3 times daily as needed for anxiety    Anxiety       ipratropium - albuterol 0.5 mg/2.5 mg/3 mL 0.5-2.5 (3) MG/3ML neb solution    DUONEB    360 mL    Take 1 vial (3 mLs) by nebulization every 6 hours    Pulmonary emphysema, unspecified emphysema type (H)       * order for DME     1 Device    Equipment being ordered: Nebulizer machine and supplies  [facility] [employer]  [phone] Fax: [phone]    SOB (shortness of breath), COPD exacerbation (H)       * order for DME     1 Device    Equipment being ordered: Oxygen portable, O2 delivered at 3L and may wean O2 to 2.5 L as tolerated    COPD (chronic obstructive pulmonary disease) (H), Pneumonia       VITAMIN D PO      Take 1 tablet by mouth daily Strength unknown        * Notice:  This list has 2 medication(s) that are the same as other medications prescribed for you. Read the directions carefully, and ask your doctor or other care provider to review them with you.

## 2018-07-06 NOTE — PROGRESS NOTES
Margarito's test performed prior to radial ABG draw. Collateral circulation confirmed.   ABG drawn from right radial artery. Pt on 100% BiPAP. Will continue to follow.    Baldomero Henley RT

## 2018-07-06 NOTE — IP AVS SNAPSHOT
[facility] [address] [facility]    [address] [place] [address]    Phone:  [phone]                                       After Visit Summary   7/6/2018    Kristine L Soulier    MRN: [id number]           After Visit Summary Signature Page     I have received my discharge instructions, and my questions have been answered. I have discussed any challenges I see with this plan with the nurse or doctor.    ..........................................................................................................................................  Patient/Patient Representative Signature      ..........................................................................................................................................  Patient Representative Print Name and Relationship to Patient    ..................................................               ................................................  Date                                            Time    ..........................................................................................................................................  Reviewed by Signature/Title    ...................................................              ..............................................  Date                                                            Time

## 2018-07-06 NOTE — PLAN OF CARE
Problem: Pneumonia (Adult)  Goal: Signs and Symptoms of Listed Potential Problems Will be Absent, Minimized or Managed (Pneumonia)  Signs and symptoms of listed potential problems will be absent, minimized or managed by discharge/transition of care (reference Pneumonia (Adult) CPG).   Outcome: Improving  Sating above 90-92% on 5L NC, denies SOB, lungs sound coarse/crackles throughout, pulmonary toilet encouraged. BP slightly elevated.

## 2018-07-06 NOTE — PROGRESS NOTES
SUBJECTIVE:   Kristine L Soulier is a 73 year old female who presents to clinic today for the following health issues:          Hospital Follow-up Visit:    Hospital/Nursing Home/IP Rehab Facility: [facility]  Date of Admission: 06/17/2018  Date of Discharge: 06/18/2018  Reason(s) for Admission: Elevated troponin in setting of probable panic attack  Chest pain, resolved            Problems taking medications regularly:  None       Medication changes since discharge: None       Problems adhering to non-medication therapy:  None    Summary of hospitalization:  [facility] discharge summary reviewed  Diagnostic Tests/Treatments reviewed.  Follow up needed: none  Other Healthcare Providers Involved in Patient s Care:         cardiology  Update since discharge: stable.      Post Discharge Medication Reconciliation: discharge medications reconciled, continue medications without change.  Plan of care communicated with patient     Coding guidelines for this visit:  Type of Medical   Decision Making Face-to-Face Visit       within 7 Days of discharge Face-to-Face Visit        within 14 days of discharge   Moderate Complexity [id number]   High Complexity [id number]        \    Patient was recently hospitalized with chest pain.  Fortunately her cardiac workup was negative.  She had elevated troponin which was thought to be from anxiety attack.    She feels like she is  having more short of breath with exertion.  Before she was hospitalized with pneumonia in May she was using 1.5 L of oxygen at home..  It was increase it to 3 L.  currently she is using 4 L of oxygen.  She needs my permission to increase her home oxygen as if she uses more than 3 L she gets a call from her oxygen company provider.  She is having occasional cough.  She is using DuoNeb 3 times a day.  She is not having any fever.    Seeing pulmonologist on 12th of next week.          Problem list and histories reviewed & adjusted, as  indicated.  Additional history: as documented    Labs reviewed in EPIC    Reviewed and updated as needed this visit by clinical staff    Reviewed and updated as needed this visit by Provider      Social History     Social History     Marital status:      Spouse name: N/A     Number of children: 5     Years of education: 12     Occupational History     retired       Self     Social History Main Topics     Smoking status: Former Smoker     Packs/day: 0.25     Years: 20.00     Types: Cigarettes     Quit date: 1/29/2014     Smokeless tobacco: Former User     Alcohol use No     Drug use: No     Sexual activity: Yes     Partners: Male     Other Topics Concern     Parent/Sibling W/ Cabg, Mi Or Angioplasty Before 65f 55m? No     Social History Narrative    Balanced Diet - tries to    Osteoporosis Prevention Measures - Dairy servings per day: 3-4    Regular Exercise -  No Describe no    Dental Exam - YES - Date: 10/09    Eye Exam - YES - Date: about a year ago    Self Breast Exam - she tries to    Abuse: Current or Past (Physical, Sexual or Emotional)- No    Do you feel safe in your environment - Yes    Guns stored in the home - No    Sunscreen used - No    Seatbelts used - Yes    Lipids -  YES - Date: years ago, was on lipitor for awhile    Glucose -  NO    Colon Cancer Screening - No, has not had a colonoscopy done before and she does not want to have one done    Hemoccults - NO    Pap Test -  YES - Date: years ago    Do you have any concerns about STD's -  No    Mammography - YES - Date: years    DEXA - NO    Immunizations reviewed and up to date - last td given years ago    Rowena Parmar ma  2009                 No Known Allergies  Patient Active Problem List   Diagnosis     CARDIOVASCULAR SCREENING; LDL GOAL LESS THAN 130     Seasonal allergic rhinitis     Advanced directives, counseling/discussion     Enlarged lymph nodes     End stage COPD (H)     Community acquired pneumonia     Health Care Home     Anxiety  "    HTN, goal below 140/80     Hilar adenopathy     Abdominal pain, generalized     RA (rheumatoid arthritis) (H)     Oxygen dependent     Positive sputum culture for Aspergillus (H) as per history      Mycobacterium avium infection (H) history      NSTEMI (non-ST elevated myocardial infarction) (H)     Chest pain     Reviewed medications, social history and  past medical and surgical history.    Review of system: for general, respiratory, CVS, GI and psychiatry negative except for noted above.     EXAM:  /57 (BP Location: Right arm, Patient Position: Sitting, Cuff Size: Adult Regular)  Pulse 124  Temp 97.9  F (36.6  C) (Oral)  Resp 24  Ht 5' 3\" (1.6 m)  SpO2 (!) 73%  Constitutional: , alert and no distress   Sitting in wheelchair  Psychiatric: mentation appears normal and affect normal/bright  Cardiovascular: Tachycardia.  Respiratory: nega using oxygen via nasal cannula.  Crackles present on right lung base.  Diffuse expiratory wheezing both lungs.    ASSESSMENT / PLAN:  (J44.9) End stage COPD (H)  (primary encounter diagnosis)  Comment: She presented with SPO2 of 73 on 4 L of oxygen.  We titrated her oxygen and on 6 L she is able to maintain her oxygen saturation around 80-90%.  She fortunately does not have a loss of dyspnea and she is speaking in full sentences.  When she was recently hospitalized on her right lung bases there was possible infiltrate but it was not treated with an antibiotic.  Today I obtained her repeat chest x-ray which shows worsening of infiltrate on the right side and there is also infiltrate on the left side.  I called radiologist and he confirmed that.  She has a history of aspergillosis and MELISA.  When she was hospitalized last time in May she was treated for community-acquired pneumonia and her recent hospitalization regarding cardiac issue puts her at high risk of developing hospital-acquired pneumonia and as mentioned earlier I cannot rule out MELISA or aspergillosis " completely.    She is extremely reluctant to go to the emergency room.  She is brought by [facility] and she wishes to call a family member to bring her to the emergency room as she does not think she needs to go through ambulance which seems appropriate.  Family member was contacted and he is going to drive her to the emergency room.  I called [facility] emergency room and I took to the ER physician.  Plan: XR Chest 2 Views            (J18.9) Pneumonia of both lower lobes due to infectious organism  Comment:   Plan:  See above. Go to ER with increase in oxygen requirement and above mentioned complex history.

## 2018-07-06 NOTE — ED NOTES
Report taken from Toyin to assume care.      Hospitalist at bedside, sats 99% ob BiPap, okay to turn off BiPap and try nasal cannula.  O2 sats 90% NC. Pt talking to MD, sats drop to 87%, now on 6L oxymask, sats 90%, MD okay with 90%.  Pt given ice chips.    Heparin infusing at 5.5 ml/hr.

## 2018-07-06 NOTE — ED NOTES
DATE:  7/6/2018   TIME OF RECEIPT FROM LAB: 13:02  LAB TEST:  Pco2  LAB VALUE:  85  RESULTS GIVEN WITH READ-BACK TO (PROVIDER):  Dr Jimenez  TIME LAB VALUE REPORTED TO PROVIDER:   1:02 PM

## 2018-07-06 NOTE — IP AVS SNAPSHOT
MRN:[id number]                      After Visit Summary   7/6/2018    Kristine L Soulier    MRN: [id number]           Thank you!     Thank you for choosing [facility] for your care. Our goal is always to provide you with excellent care. Hearing back from our patients is one way we can continue to improve our services. Please take a few minutes to complete the written survey that you may receive in the mail after you visit with us. Thank you!        Patient Information     Date Of Birth          1945        About your hospital stay     You were admitted on:  July 6, 2018 You last received care in the:  [facility] [address] [facility]    You were discharged on:  July 12, 2018        Reason for your hospital stay       You were admitted to hospital due to COPD exacerbation and possible pneumonia. You will be going home on antibiotics and Prednisone taper.  Once you taper down to Prednisone 10mg daily, continue that until you follow up with your lung doctor.                  Who to Call     For medical emergencies, please call 911.  For non-urgent questions about your medical care, please call your primary care provider or clinic, [phone]          Attending Provider     Provider Specialty    Radha Andrew MD Emergency Medicine    Tony, Amara PADILLA MD Emergency Medicine    Vianene, Yuki ROSENBERG MD Internal Medicine       Primary Care Provider Office Phone # Fax #    Carlos Alberto Donte Nelson [phone]      After Care Instructions     Activity       Your activity upon discharge: activity as tolerated            Diet       Follow this diet upon discharge: Regular            Oxygen Adult       [facility] Order 4 liter(s) by nasal cannula at rest and 8L by Nasal Cannula with activity with use of portable tank. Expected treatment length is indefinite (99 months).. Test on conserving device as applicable.    Patients who qualify for home O2 coverage under the CMS  guidelines require ABG tests or O2 sat readings obtained closest to, but no earlier than 2 days prior to the discharge, as evidence of the need for home oxygen therapy. Testing must be performed while patient is in the chronic stable state. See notes for O2 sats.    I certify that this patient, Kristine L Soulier has been under my care and that I, or a nurse practitioner or physician's assistant working with me, had a face-to-face encounter that meets the face-to-face encounter requirements with this patient on 7/12/2018. The patient, Kristine L Soulier was evaluated or treated in whole, or in part, for the following medical condition, which necessitates the use of the ordered oxygen. Treatment Diagnosis: End stage COPD    Attending Provider: Alice Mortensen  Physician signature: See electronic signature associated with these discharge orders  Date of Order: July 12, 2018                  Follow-up Appointments     Follow-up and recommended labs and tests        You have an Appointment scheduled with Dr Mayer:  Wednesday  Aug 15. 1:15  [facility]  [phone]    There are no appointments available with Dr Butler or any other provider until Aug 15.You have been placed on a Cancellation list if anything sooner is available            Follow-up and recommended labs and tests        Follow up with primary care provider, Carlos Alberto Nelson MD, within 7 days for hospital follow- up.  No follow up labs or test are needed.  Follow up with pulmonologist at your scheduled appointment.                  Your next 10 appointments already scheduled     Jul 16, 2018  9:40 AM CDT   Office Visit with Carlos Alberto Nelson MD   [facility] ([facility])    [address] [place] [id number] [phone]           Bring a current list of meds and any records pertaining to this visit. For Physicals, please bring immunization records and any forms needing to be filled out.  "Please arrive 10 minutes early to complete paperwork.              Additional Services     Home Care PT Referral for Hospital Discharge       PT to eval and treat    Your provider has ordered home care - physical therapy. If you have not been contacted within 2 days of your discharge please call the department phone number listed on the top of this document.            Home care nursing referral       RN skilled nursing visit. RN to assess vital signs and weight and respiratory and cardiac status.    Your provider has ordered home care nursing services. If you have not been contacted within 2 days of your discharge please call the inpatient department phone number at [phone] .                  Pending Results     Date and Time Order Name Status Description    7/6/2018 1732 EKG 12-lead, tracing only Preliminary             Statement of Approval     Ordered          07/12/18 1120  I have reviewed and agree with all the recommendations and orders detailed in this document.  EFFECTIVE NOW     Approved and electronically signed by:  Alice Mortensen MD             Admission Information     Date & Time Provider Department Dept. Phone    7/6/2018 Yuki Khan MD [facility] [address] [facility] [phone]      Your Vitals Were     Blood Pressure Pulse Temperature Respirations Height Weight    126/55 (BP Location: Right arm) 84 98.7  F (37.1  C) (Oral) 18 1.6 m (5' 3\") 48.9 kg (107 lb 12.9 oz)    Pulse Oximetry BMI (Body Mass Index)                90% 19.1 kg/m2          Care EveryWhere ID     This is your Care EveryWhere ID. This could be used by other organizations to access your [facility] medical records  [id number]        Equal Access to Services     ANDRAE MARMOLEJO : Hadii simi Garcia, waaxda luzeyadadaha, qaybta kaalmayamel betancourt . So [facility] [phone].    ATENCIÓN: Si habla español, tiene a grover disposición servicios gratuitos de " pierrea lingüística. Shady al [phone].    We comply with applicable federal civil rights laws and [place] laws. We do not discriminate on the basis of race, color, national origin, age, disability, sex, sexual orientation, or gender identity.               Review of your medicines      START taking        Dose / Directions    cefdinir 300 MG capsule   Commonly known as:  OMNICEF   Used for:  Bilateral pulmonary infiltrates on CXR        Dose:  300 mg   Take 1 capsule (300 mg) by mouth 2 times daily for 4 days   Quantity:  8 capsule   Refills:  0       * predniSONE 10 MG tablet   Commonly known as:  DELTASONE   Used for:  COPD exacerbation (H)        Dose:  30 mg   Start taking on:  7/13/2018   Take 3 tablets (30 mg) by mouth daily for 3 days   Quantity:  9 tablet   Refills:  0       * predniSONE 20 MG tablet   Commonly known as:  DELTASONE   Used for:  COPD exacerbation (H)        Dose:  20 mg   Start taking on:  7/16/2018   Take 1 tablet (20 mg) by mouth daily for 4 days   Quantity:  4 tablet   Refills:  0       * predniSONE 10 MG tablet   Commonly known as:  DELTASONE   Used for:  COPD exacerbation (H)        Dose:  10 mg   Start taking on:  7/20/2018   Take 1 tablet (10 mg) by mouth daily   Quantity:  30 tablet   Refills:  0       * Notice:  This list has 3 medication(s) that are the same as other medications prescribed for you. Read the directions carefully, and ask your doctor or other care provider to review them with you.      CONTINUE these medicines which may have CHANGED, or have new prescriptions. If we are uncertain of the size of tablets/capsules you have at home, strength may be listed as something that might have changed.        Dose / Directions    ipratropium - albuterol 0.5 mg/2.5 mg/3 mL 0.5-2.5 (3) MG/3ML neb solution   Commonly known as:  DUONEB   This may have changed:  when to take this   Used for:  Pulmonary emphysema, unspecified emphysema type (H)        Dose:  3 mL   Take 1 vial  (3 mLs) by nebulization every 6 hours   Quantity:  360 mL   Refills:  0         CONTINUE these medicines which have NOT CHANGED        Dose / Directions    ACETAMINOPHEN PO        Dose:  1000 mg   Take 1,000 mg by mouth 2 times daily as needed   Refills:  0       albuterol 108 (90 Base) MCG/ACT Inhaler   Commonly known as:  PROAIR HFA/PROVENTIL HFA/VENTOLIN HFA        Dose:  2 puff   Inhale 2 puffs into the lungs every 6 hours as needed for shortness of breath / dyspnea or wheezing   Refills:  0       BROVANA 15 MCG/2ML Nebu neb solution   Generic drug:  arformoterol        Dose:  15 mcg   Take 15 mcg by nebulization 2 times daily   Refills:  0       budesonide 0.5 MG/2ML neb solution   Commonly known as:  PULMICORT        Dose:  0.5 mg   Take 0.5 mg by nebulization 2 times daily   Refills:  0       calcium carbonate-vitamin D 600-200 MG-UNIT Tabs        Dose:  1 tablet   Take 1 tablet by mouth daily   Refills:  0       dextromethorphan-guaiFENesin  MG per 12 hr tablet   Commonly known as:  MUCINEX DM        Dose:  1 tablet   Take 1 tablet by mouth every 12 hours   Refills:  0       hydrOXYzine 10 MG tablet   Commonly known as:  ATARAX   Used for:  Anxiety        Dose:  10-20 mg   Take 1-2 tablets (10-20 mg) by mouth 3 times daily as needed for anxiety   Quantity:  60 tablet   Refills:  0       * order for DME   Used for:  SOB (shortness of breath), COPD exacerbation (H)        Equipment being ordered: Nebulizer machine and supplies  [facility]  [phone] Fax: [phone]   Quantity:  1 Device   Refills:  0       * order for DME   Used for:  COPD (chronic obstructive pulmonary disease) (H), Pneumonia        Equipment being ordered: Oxygen portable, O2 delivered at 3L and may wean O2 to 2.5 L as tolerated   Quantity:  1 Device   Refills:  0       VITAMIN D PO        Dose:  1 tablet   Take 1 tablet by mouth daily Strength unknown   Refills:  0       * Notice:  This list has 2 medication(s) that are the  same as other medications prescribed for you. Read the directions carefully, and ask your doctor or other care provider to review them with you.         Where to get your medicines      These medications were sent to [facility] [place], [place] - [address]  [address] [place] [address]     Phone:  [phone]     cefdinir 300 MG capsule    predniSONE 10 MG tablet    predniSONE 10 MG tablet    predniSONE 20 MG tablet                Protect others around you: Learn how to safely use, store and throw away your medicines at www.disposemymeds.org.        ANTIBIOTIC INSTRUCTION     You've Been Prescribed an Antibiotic - Now What?  Your healthcare team thinks that you or your loved one might have an infection. Some infections can be treated with antibiotics, which are powerful, life-saving drugs. Like all medications, antibiotics have side effects and should only be used when necessary. There are some important things you should know about your antibiotic treatment.      Your healthcare team may run tests before you start taking an antibiotic.    Your team may take samples (e.g., from your blood, urine or other areas) to run tests to look for bacteria. These test can be important to determine if you need an antibiotic at all and, if you do, which antibiotic will work best.      Within a few days, your healthcare team might change or even stop your antibiotic.    Your team may start you on an antibiotic while they are working to find out what is making you sick.    Your team might change your antibiotic because test results show that a different antibiotic would be better to treat your infection.    In some cases, once your team has more information, they learn that you do not need an antibiotic at all. They may find out that you don't have an infection, or that the antibiotic you're taking won't work against your infection. For example, an infection caused by a virus can't be treated with  antibiotics. Staying on an antibiotic when you don't need it is more likely to be harmful than helpful.      You may experience side effects from your antibiotic.    Like all medications, antibiotics have side effects. Some of these can be serious.    Let you healthcare team know if you have any known allergies when you are admitted to the hospital.    One significant side effect of nearly all antibiotics is the risk of severe and sometimes deadly diarrhea caused by Clostridium difficile (C. Difficile). This occurs when a person takes antibiotics because some good germs are destroyed. Antibiotic use allows C. diificile to take over, putting patients at high risk for this serious infection.    As a patient or caregiver, it is important to understand your or your loved one's antibiotic treatment. It is especially important for caregivers to speak up when patients can't speak for themselves. Here are some important questions to ask your healthcare team.    What infection is this antibiotic treating and how do you know I have that infection?    What side effects might occur from this antibiotic?    How long will I need to take this antibiotic?    Is it safe to take this antibiotic with other medications or supplements (e.g., vitamins) that I am taking?     Are there any special directions I need to know about taking this antibiotic? For example, should I take it with food?    How will I be monitored to know whether my infection is responding to the antibiotic?    What tests may help to make sure the right antibiotic is prescribed for me?      Information provided by:  www.cdc.gov/getsmart  U.S. Department of Health and Human Services  Centers for disease Control and Prevention  National Center for Emerging and Zoonotic Infectious Diseases  Division of Healthcare Quality Promotion             Medication List: This is a list of all your medications and when to take them. Check marks below indicate your daily home  schedule. Keep this list as a reference.      Medications           Morning Afternoon Evening Bedtime As Needed    ACETAMINOPHEN PO   Take 1,000 mg by mouth 2 times daily as needed                                   albuterol 108 (90 Base) MCG/ACT Inhaler   Commonly known as:  PROAIR HFA/PROVENTIL HFA/VENTOLIN HFA   Inhale 2 puffs into the lungs every 6 hours as needed for shortness of breath / dyspnea or wheezing                                   BROVANA 15 MCG/2ML Nebu neb solution   Take 15 mcg by nebulization 2 times daily   Last time this was given:  15 mcg on 7/12/2018  7:07 AM   Generic drug:  arformoterol                                   budesonide 0.5 MG/2ML neb solution   Commonly known as:  PULMICORT   Take 0.5 mg by nebulization 2 times daily   Last time this was given:  0.5 mg on 7/12/2018  7:06 AM                                   calcium carbonate-vitamin D 600-200 MG-UNIT Tabs   Take 1 tablet by mouth daily                                   cefdinir 300 MG capsule   Commonly known as:  OMNICEF   Take 1 capsule (300 mg) by mouth 2 times daily for 4 days                                   dextromethorphan-guaiFENesin  MG per 12 hr tablet   Commonly known as:  MUCINEX DM   Take 1 tablet by mouth every 12 hours                                   hydrOXYzine 10 MG tablet   Commonly known as:  ATARAX   Take 1-2 tablets (10-20 mg) by mouth 3 times daily as needed for anxiety   Last time this was given:  10 mg on 7/11/2018  1:12 PM                                   ipratropium - albuterol 0.5 mg/2.5 mg/3 mL 0.5-2.5 (3) MG/3ML neb solution   Commonly known as:  DUONEB   Take 1 vial (3 mLs) by nebulization every 6 hours   Last time this was given:  3 mLs on 7/12/2018 11:22 AM                                   * order for DME   Equipment being ordered: Nebulizer machine and supplies  [facility]  [phone] Fax: [phone]                                * order for DME   Equipment being ordered:  Oxygen portable, O2 delivered at 3L and may wean O2 to 2.5 L as tolerated                                * predniSONE 10 MG tablet   Commonly known as:  DELTASONE   Take 3 tablets (30 mg) by mouth daily for 3 days   Start taking on:  7/13/2018   Last time this was given:  30 mg on 7/12/2018  8:40 AM                                   * predniSONE 20 MG tablet   Commonly known as:  DELTASONE   Take 1 tablet (20 mg) by mouth daily for 4 days   Start taking on:  7/16/2018   Last time this was given:  30 mg on 7/12/2018  8:40 AM                                * predniSONE 10 MG tablet   Commonly known as:  DELTASONE   Take 1 tablet (10 mg) by mouth daily   Start taking on:  7/20/2018   Last time this was given:  30 mg on 7/12/2018  8:40 AM                                VITAMIN D PO   Take 1 tablet by mouth daily Strength unknown                                   * Notice:  This list has 5 medication(s) that are the same as other medications prescribed for you. Read the directions carefully, and ask your doctor or other care provider to review them with you.

## 2018-07-06 NOTE — H&P
Admitted:     07/06/2018      CHIEF COMPLAINT:  Sent in from clinic due to hypoxia.      HISTORY OF PRESENT ILLNESS:  Kristine Soulier is a 73-year-old female with a history of end-stage COPD, who was recently hospitalized at [facility] from 06/17-06/18/2018.  During that hospitalization, the patient had a workup for chest pain.  She had a mildly elevated troponin of 0.092, 0.094 and 0.092.  The patient had a stress echocardiogram on 06/18/2018 which showed normal LV function and wall motion.  Her EF was 65%-70% with a normal stress echo.  There was no evidence of stress-induced ischemia.  Cardiology did see the patient, and they were okay with her being discharged.  The patient also has a history of chronic end-stage COPD.  She is on 5 liters of oxygen at home and follows with Dr. Butler from [facility].  The patient does have a history of Mycobacterium avium intracellulare complex and aspergillosis; however, this is from a sputum culture from 04/16/2014.  Her last sputum culture from 05/15/2018 showed light growth of normal charis.      The patient says she does have a chronic cough but no fever.  She actually denies that she feels more short of breath.  She denies chest pain or abdominal pain.  She says she just showed up at her clinic for a routine followup.  The patient has been on oxygen at home for many years, normally on 5 liters.      In the emergency room, the patient was started on Solu-Medrol.  She actually was put on BiPAP and the patient requested to be off of BiPAP.  She was able to be weaned off of the BiPAP to an OxyMask with adequate oxygenation of approximately 90%.  Blood cultures were obtained.  She did have an elevated white count of 17.5; it was 12.4 on 06/18/2018.  The patient is being admitted to the hospital for hypoxia.  The patient had a chest x-ray done in the emergency room which showed patchy opacities both lungs, more pronounced in the right base, worsened since  06/17/2018 concerning for infection superimposed on the patient's underlying chronic lung disease.  The patient also had a CT of her chest 07/06/2018 which showed worsening of the pulmonary infiltrates in the right lower lobe and to a lesser extent in the lateral segment of the right middle lobe and central aspect of the left upper lobe.  No change in the rest of the infiltrates with peripheral distribution resolution of mucus plugging and the bronchi of the basal segments of the lower lobes.  No evidence for pulmonary mass, upper lobe emphysema.  Findings consistent with pneumonia superimposed on chronic obstructive pulmonary disease.  The patient besides getting 125 mg of Solu-Medrol did receive 1 dose of Zosyn 4.5 grams.  She was started on a heparin drip due to the fact that she had a troponin of 0.33.  The patient did have a cardiac workup in both May and June.  On 05/16/2018, the patient had an echocardiogram which showed LV normal size and function with an EF of 60%.  There was mild to moderate tricuspid regurg, pulmonary hypertension in the RV system at 42 mmHg plus the right atrial pressure.  The patient also had a dobutamine stress echo 06/18/2018.  This showed up to 1 mm of ST depression equivocal finding with no convincing ischemic changes.  The rest echo had a normal LV function with wall motion at rest with an EF of 55%-60%.  The stress echo showed a normal stress echocardiogram with no evidence of stress-induced ischemia with an EF of 65%-70%.      PAST MEDICAL HISTORY:   1.  History of end-stage COPD followed by Dr. Butler from [facility].   2.  History of sputum with aspergillosis and Mycobacterium avium in 2014.   3.  History of a non-ST elevation MI which was thought secondary to a probable panic attack with a troponin of 0.09.  She was seen by Cardiology and had a dobutamine stress echo done on 06/18/2018 which was essentially normal without any evidence of ischemia.   4.  History of  hypertension, not on any medications prior to admission.   5.  History of rheumatoid arthritis with some changes of her hands consistent with this.   6.  Grade I diastolic congestive heart failure per echocardiogram 05/18/2018.   7.  History of tobacco use.  She quit in 2014.   8.  History of allergic rhinitis.      ALLERGIES:  NO KNOWN DRUG ALLERGIES.       MEDICATIONS ON ADMISSION:   1.  Acetaminophen 1000 mg b.i.d. p.r.n.   2.  Albuterol 2 puffs every 6 hours p.r.n.   3.  Brovana 15 mcg by nebulization b.i.d.   4.  Budesonide or Pulmicort 0.5 mg b.i.d.   5.  Calcium carbonate with vitamin D.   6.  Vitamin D.   7.  Cholecalciferol.   8.  Mucinex p.r.n.    9.  Hydroxyzine 10-20 mg 3 times daily p.r.n. anxiety.     10.  Ipratropium.   11.  Albuterol neb b.i.d.      FAMILY HISTORY:  Mother had a history of diabetes and asthma as well as blood clots and emphysema.  Father had alcohol related.      SOCIAL HISTORY:  The patient lives in a [facility] where she cooks her own meals.  She does not drive and neither does her family that lives around her.  She says she gets her groceries by getting on the van from the assisted living.  She is a former heavy smoker and smoked most of her life until about 6 years ago she says.  She does not drink alcohol.      REVIEW OF SYSTEMS:   CONSTITUTIONAL:  The patient denies fevers, chills and night sweats.  She has lost about 6 pounds since her last hospitalization.     The rest of her 10-point review of systems is negative.      PHYSICAL EXAMINATION:   GENERAL:  Cachectic-appearing female.   VITAL SIGNS:  Her last blood pressure in the ER was 142/74 with a pulse of 104, respiratory rate 25, temperature of 98.4.   HEENT:  Pupils are equal.  Extraocular movements are intact.  Pharynx is dry.   CHEST:  She has deep markedly decreased breath sounds throughout with scattered rhonchi.   CARDIOVASCULAR:  Distant heart sounds but no murmur.   ABDOMEN:  Positive bowel sounds, soft and  nontender.   EXTREMITIES:  No edema.   NEUROLOGIC:  She moves all extremities.   SKIN:  She does not have any wounds that are visible.  No rashes.      LABORATORY STUDIES:  Sodium 139, potassium 4.2, chloride 93, bicarb 39, BUN 21, creatinine 0.49.  Her albumin was 2.9, total protein of 8.9.  LFTs within normal limits.  Lactic acid 1.7.  BNP 1484.  Troponin 0.333.  She had an ABG with pH of 7.32, pCO2 of 85, pO2 of 90, bicarbonate of 44.  Her white count was 17.5, hemoglobin 10.5, platelet count of 348.  Blood cultures were obtained also.      ASSESSMENT AND PLAN:  Kristine Soulier is a 73-year-old female with end-stage chronic obstructive pulmonary disease.  She is on chronic oxygen 5 liters at home.  She is followed by Dr. Butler from [facility].  The patient was recently hospitalized from 06/17-06/18/2018 when she had elevated troponin of 0.09.  She was seen by Cardiology.  Her elevated troponin was thought to be secondary to probable panic attack.  She did have an echocardiogram in May, and she had a dobutamine stress test in 06/2018 which was negative for ischemia.      The patient now presents from clinic which was for a routine followup and the patient was noted to be hypoxic with O2 sats of 88% on 6 liters.  Because of this, the patient was sent to the ER where initially her O2 sat was 60%.  She was put on BiPAP.  The patient actually does not complain of being more short of breath.  She has a chronic cough, sputum of mostly clear sputum.  She denies fevers, chills and night sweats.  She denies any chest pain or abdominal pain.  She was initially going to be admitted to the ICU but was able to be taken off the BiPAP in the emergency room and will be admitted to [facility].  The patient does have an elevated troponin of 0.33 and an elevated white count of 17.5.  The patient was started on some Zosyn and given Solu-Medrol in the emergency room.  A CT of her chest showed worsening infiltrate.      PLAN:   1.   Hypoxia.  I suspect that this is worsening infiltrate causing worsening hypoxia.  The patient, however, does not have a fever.  She denies being more short of breath and says she has felt worse at home in the past when she was not admitted to the hospital.  For now, we will continue the patient on Solu-Medrol.  She got a loading dose in the emergency room and will continue her on 60 mg IV q.6.  We will have her on ceftriaxone and azithromycin for now instead of Zosyn.  She has not had a fever.  Would like to have Dr. Butler see the patient.  In the past, she did have aspergillosis and Mycobacterium avium, but this was in 2014.  We will see what Dr. Butler would like to do about her increasing infiltrates.  At this point, we will just keep her on ceftriaxone and azithromycin for now.  Blood cultures were obtained.  Would also like to check a sputum, although the last sputum the patient had in May was light growth of normal charis.   2.  Elevated troponin.  The patient does not have any chest pain.  The patient did not have an EKG and will check an EKG.  We will also get serial troponins and the patient was started on heparin.  We will continue this.  Would like to recheck another echo.  She had one last done in May.  We will see if there is any change in her EF.  The patient had a dobutamine stress echo just done last month which did not show any evidence of ischemia.  The patient was also seen by Cardiology during that hospitalization.  For now, we will trend her troponin and also continue the patient on a baby aspirin.   3.  History of rheumatoid arthritis.  The patient has some changes in her hands consistent with chronic rheumatoid arthritis.   4.  History of anxiety.   5.  Remote history of sputum culture positive for aspergillosis and Mycobacterium avium infection in 2014.      CODE STATUS:  The patient does not want to be intubated.  She would only want one round of CPR and shocks.      DEEP VENOUS THROMBOSIS  PROPHYLAXIS:  The patient will be on a heparin drip.      DISPOSITION:  The patient will be [place] [facility] due to the fact she did need BiPAP in the emergency room and also has an elevated troponin, although no chest pain.         CHINYERE TERRAZAS MD             D: 2018   T: 2018   MT: WILDA      Name:     SOULIER, KRISTINE   MRN:      [id number]        Account:      [id number]   :      1945        Admitted:     2018                   Document: [id number]

## 2018-07-06 NOTE — ED PROVIDER NOTES
"  History     Chief Complaint:  Shortness of Breath     HPI   Kristine L Soulier is a 73 year old female with a history of COPD who presents to the emergency department today for evaluation of shortness of breath. The patient was seen in clinic today and sent to the ED from the clinic. The patient reports she has COPD and has oxygen at home. She states she has been coughing recently and has been short of breath for about a month. She denies any chest pain or recent fevers. The patient was hospitalized in May for pneumonia. The patient denies any history of CHF.    Allergies:  No Known Allergies     Medications:    Proair  Brovana  Pulmicort  Atarax  Duoneb    Past Medical History:    Allergic rhinitis   Anxiety   Arthritis   COPD (chronic obstructive pulmonary disease)    Former smoker   Hypertension  Arthritis    Past Surgical History:   History reviewed.  No pertinent past surgical history.    Family History:    Mother: asthma, diabetes, arthritis, blood clots, kidney problems, emphysema  Father: alcohol/drug  Maternal aunt: arthritis     Social History:  The patient was accompanied to the ED by family.  Smoking Status: Former Smoker   Packs/day: 0.25   Years: 20   Type: cigarettes   Quit date: 01/29/2014  Smokeless Tobacco: Former User  Alcohol Use: Negative  Marital Status:        Review of Systems   Constitutional: Negative for fever.   Respiratory: Positive for cough and shortness of breath.    Cardiovascular: Negative for chest pain.   All other systems reviewed and are negative.    Physical Exam     Patient Vitals for the past 24 hrs:   BP Temp Temp src Pulse Heart Rate Resp SpO2 Height Weight   07/06/18 1449 - - - - 92 22 100 % - -   07/06/18 1447 165/81 - - - 89 23 99 % - -   07/06/18 1430 177/82 - - - 99 18 99 % - -   07/06/18 1400 159/85 - - - 98 19 99 % - -   07/06/18 1345 141/73 - - - 105 29 98 % - -   07/06/18 1330 142/74 - - - 104 25 99 % 1.6 m (5' 3\") 46.3 kg (102 lb)   07/06/18 1326 - - - - " - - 98 % - -   07/06/18 1315 135/72 - - - 107 24 100 % - -   07/06/18 1300 148/76 - - - 104 20 97 % - -   07/06/18 1253 - - - - 108 25 98 % - -   07/06/18 1252 - - - - 107 22 97 % - -   07/06/18 1245 146/75 - - - 107 22 99 % - -   07/06/18 1237 - - - - 99 22 95 % - -   07/06/18 1235 - - - - 104 20 93 % - -   07/06/18 1230 151/78 - - - 110 24 90 % - -   07/06/18 1228 - - - - 99 26 95 % - -   07/06/18 1220 156/78 - - - 111 - (!) 84 % - -   07/06/18 1215 - - - - 120 17 (!) 62 % - -   07/06/18 1156 127/63 98.4  F (36.9  C) Temporal 120 - 28 93 % - -        Physical Exam  General: Thin, chronically ill appearing  Eyes: PERRL, conjunctivae pink no scleral icterus or conjunctival injection  ENT:  Moist mucus membranes, posterior oropharynx clear without erythema or exudates  Respiratory:  Lungs with diffuse wheezes and prolonged end expiratory phase, coarse bilaterally, sat'ing 62% on RA on arrival  CV: Normal rate and rhythm, no murmurs/rubs/gallops  GI:  Abdomen soft and non-distended.  Normoactive BS.  No tenderness, guarding or rebound  Skin: Warm, dry.  No rashes or petechiae  Musculoskeletal: No peripheral edema or calf tenderness  Neuro: Alert and oriented to person/place/time  Psychiatric: Normal affect      Emergency Department Course     ECG:  ECG taken at 1219, ECG read at 1219  Sinus tachycardia  Possible left atrial enlargement  Anteroseptal infarct, age undetermined  Abnormal ECG  Rate 114 bpm. AR interval 142 ms. QRS duration 86 ms. QT/QTc 316/435 ms. P-R-T axes 83 87 52.    Imaging:  Radiology findings were communicated with the patient who voiced understanding of the findings.    Chest CT, IV contrast only - PE protocol  1. Worsening of pulmonary infiltrates in the right lower lobe and to a  lesser extent in the lateral segment of the right middle lobe and  central aspect of left upper lobe. No change in the rest of  infiltrates with peripheral distribution.  2. Resolution of mucous plugging in the bronchi  of the basal segments  of lower lobes.  3. No evidence of a pulmonary mass.  4. Upper lobe emphysema. The findings are consistent with pneumonia  superimposed on chronic obstructive pulmonary disease.  Reading per radiology    Laboratory:  Laboratory findings were communicated with the patient who voiced understanding of the findings.    Blood culture: pending  Blood gas arterial and oxyhgb: pH 7.32 (L), pCO2 85 (HH), pO2 90, Bicarbonate 44 (H), oxyhemoglobin 96, base excess 14.8  CBC: WBC 17.5 (H), HGB 10.5(L),   CMP: chloride 93 (L), carbon dioxide 39 (H), glucose 121 (H), albumin 2.9(L) o/w WNL (Creatinine 0.49 (L))  Magnesium: 2.0  Lactic acid (Collected 1225) 1.7  Troponin (Collected 1225): 0.333(HH)     Interventions:  1316 Zosyn 4.5 g IV  1318 Solumedrol 125 mg IV  1326 Duoneb 3 mL Nebulization  1435 Heparin drip 550 units/hour IV  1538 Aspirin 325 mg Oral    Emergency Department Course:    1218 Nursing notes and vitals reviewed.    1220 I performed an exam of the patient as documented above.     1225 IV was inserted and blood was drawn for laboratory testing, results above.     1240 Blood was drawn for laboratory testing, results above.     1416 The patient was sent for a chest CT while in the emergency department, results above.      1502  I spoke with Dr. Khan of the hospitalist service regarding patient's presentation, findings, and plan of care.     1611 I personally reviewed the laboratory and imaging results with the patient and answered all related questions prior to admission.    Impression & Plan      Medical Decision Making:  Kristine L Soulier is a 73 year old female with a complicated past medical history of COPD and possible pneumonia during a recent hospitalization who presents to the emergency department today for evaluation of shortness of breath and hypoxia.  On arrival she was significantly hypoxic and placed on bipap with great improvement.  Her oxygen requirements are  significantly more than are reported from her baseline.  Given her recent hospitalization worsening hypoxia CT scan of the chest was obtained and shows no pulmonary embolism.  It does show worsening infiltrates.  Antibiotics were given to her.  She was also treated for COPD exacerbation. She denies chest discomfort and her EKG is unchanged, but troponin did come back elevated.   It is unclear if this is secondary to acute coronary syndrome or on ongoing leak secondary to congestive heart failure.  She was treated with heparin.  She is adamant to me that she does not want to be intubated but would allow one attempted CPR should she have a code.  She reluctantly her agreed to be admitted to the hospital.  BiPAP was weaned and I discussed with respiratory that we need to try to keep her oxygen saturations down given that she has a baseline CO2 retainer.  She was able to transition to nasal cannula.  I spoke with Yuki Khan MD who graciously agreed to admit the patient to the hospital for further evaluation treatment.    Diagnosis:    ICD-10-CM    1. SOB (shortness of breath) R06.02    2. Respiratory distress R06.00    3. NSTEMI (non-ST elevated myocardial infarction) (H) I21.4    4. Bilateral pulmonary infiltrates on CXR R91.8      Disposition:   The patient is admitted into the care of Dr. Khan.     Critical Care time was 35 minutes for this patient excluding procedures.     Scribe Disclosure:  I, Jazzmine Oneal, am serving as a scribe at 12:26 PM on 7/6/2018 to document services personally performed by Amara Jimenez MD based on my observations and the provider's statements to me.      EMERGENCY DEPARTMENT       Amara Jimenez MD  07/11/18 [id number]

## 2018-07-06 NOTE — ED NOTES
Patient resting comfortably at this time. BiPAP continuing at 80% FiO2.  Pharmacy going through patient's home medication at this time.

## 2018-07-07 NOTE — PROGRESS NOTES
"[facility]  Hospitalist Progress Note        Raffy Pendleton MD  07/07/2018        Interval History:       patient fees a bit better, breathing easier than when she was admitted. No bowel or bladder complaints, No nausea or vomiting.         Assessment and Plan:        suspected PNA.  although no signs of systemic infectious process.   - -  Continue empiric Ceftriaxone and Azithromycin  For presumed PNA       Acute on chronic COPD exacerbation:    - - Continue supplemental oxygen, (on oxygen at home)  - -  Continue steorids   - - remote history Aspergillosis and sya MELISA    - -  Ivan following    Elevated troponin level    New  Q waves on inferior leads are new June of 2018. Has chemical stress test on June 2018. Less concern for ACS presently, however her  clinical presentations ins not completely explained by lung process. patient ha no chest pain at the present time  - - Low threshold for evaluating for PE, deferred to Pulmonary team.  - -  Continue heparin infusion   - - Continue PTA cardiac mediations  - -  Cardiology  Input would be appreciated     History of rheumatoid arthritis.  The patient has some changes in her hands consistent with chronic rheumatoid arthritis.        DVT prophylaxis; Heparin   CODE: FULL   dispo; Pending Pulmonary and cardiology input, likely few more days                      Physical Exam:      Heart Rate: 72, Blood pressure 138/72, pulse 120, temperature 97.9  F (36.6  C), temperature source Oral, resp. rate 14, height 1.6 m (5' 3\"), weight 48.2 kg (106 lb 4.2 oz), SpO2 100 %.  Vitals:    07/06/18 1330 07/07/18 0500   Weight: 46.3 kg (102 lb) 48.2 kg (106 lb 4.2 oz)     Vital Signs with Ranges  Temp:  [97.9  F (36.6  C)-98.4  F (36.9  C)] 97.9  F (36.6  C)  Pulse:  [120] 120  Heart Rate:  [] 72  Resp:  [14-29] 14  BP: (124-177)/(63-89) 138/72  FiO2 (%):  [50 %-100 %] 60 %  SpO2:  [62 %-100 %] 100 %  I/O's Last 24 hours  I/O last 3 completed " shifts:  In: 1027 [P.O.:405; I.V.:622]  Out: 650 [Urine:650]    Constitutional:  Cachectic appearing woman, in NAD    Lungs:  Decreased breath sounds through out, prolonged exp, phase of breathiong, no crackles   Cardiovascular:  S1 S2 heard no murmuirs   Abdomen: Soft, NT/ND = BS    SKIN Warm dry no rashes   Other:           Medications:          arformoterol  15 mcg Nebulization BID     aspirin  81 mg Oral Daily     azithromycin  250 mg Oral Daily     budesonide  0.5 mg Nebulization BID     Calcium carb-Vitamin D 500 mg [place]kat-200 units  1 tablet Oral Daily     cefTRIAXone  1 g Intravenous Q24H     ipratropium - albuterol 0.5 mg/2.5 mg/3 mL  3 mL Nebulization Q4H While awake     methylPREDNISolone  62.5 mg Intravenous Q6H     sodium chloride (PF)  3 mL Intracatheter Q8H     PRN Meds: acetaminophen (TYLENOL) tablet 325-650 mg, albuterol, hydrOXYzine, lidocaine 4%, lidocaine (buffered or not buffered), melatonin, naloxone, nitroGLYcerin, ondansetron **OR** ondansetron, sodium chloride (PF)         Data:      All new lab and imaging data was reviewed.   Recent Labs   Lab Test  07/07/18   0610  07/06/18   1225  06/18/18   0651   WBC  7.9  17.5*  12.4*   HGB  9.5*  10.5*  9.8*   MCV  106*  106*  102*   PLT  272  348  345      Recent Labs   Lab Test  07/07/18   0610  07/06/18   1225  06/18/18   0651   NA  141  139  138   POTASSIUM  4.8  4.2  4.2   CHLORIDE  98  93*  98   CO2  39*  39*  37*   BUN  17  21  9   CR  0.40*  0.49*  0.36*   ANIONGAP  4  7  3   ISADORA  8.3*  8.5  8.0*   GLC  128*  121*  89     Recent Labs   Lab Test  07/07/18   0610  07/07/18   0145  07/06/18   2200   TROPI  0.284*  0.294*  0.352*

## 2018-07-07 NOTE — PROGRESS NOTES
Patient is on a 5L NC with SpO2 in the upper 90's. Pt wears 5L NC at baseline. BS diminished. MDI instruct completed. Peak flow instruct completed, but pt isn't able to do the peak flow due to not being able to take a deep breath. All nebs were given as ordered.  Will cont to follow.  7/6/2018  Jacki Workman RRT

## 2018-07-07 NOTE — PROGRESS NOTES
Heparin infusing at 550 units/hr, 10A level <0.10, spoke with pharmacy, needs order to dose. Notified Dr. Prakash. See MD's  for pharmacy to dose.

## 2018-07-07 NOTE — CONSULTS
Consult Date:  07/07/2018      CARDIOLOGY CONSULTATION      REQUESTING PHYSICIAN:  Dr. Pendleton      CHIEF COMPLAINT:  Elevated troponin.      HISTORY OF PRESENT ILLNESS:  Ms. 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  She had no chest pain with any of this.  She has no significant ischemic ECG changes.  A troponin was checked and was slightly elevated at 0.33 that had a slight trend up to 0.35 x 2 and then has trended slightly back down to 0.29 x 2.  As mentioned, the patient did not have any chest pain at any point.      The patient is admitted to the hospital.  She is being treated for pneumonia for COPD exacerbation.  Apparently, she actually does not feel that anything has changed much and did not even feel that her breathing was significantly worse.  She denies any major complaints at the moment.      Notably, the patient was in the hospital recently with a similar issue, a slight troponin increase.  After that she had a dobutamine stress echo that was normal.      VITAL SIGNS:  Pulse of 72, blood pressure 138/72.      PHYSICAL EXAMINATION:   GENERAL:  No apparent distress, sitting comfortably in bed.   HEART:  Regular rate and rhythm, no murmurs.   LUNGS:  Quiet, a few faint crackles.   ABDOMEN:  Soft.   EXTREMITIES:  No peripheral edema.   MENTAL:  Alert and oriented, normal conversation.   NEUROLOGIC:  Moving all extremities.      ASSESSMENT AND PLAN:   1.  Slight troponin elevation with minimal delta, likely secondary to stress of medical illness.   2.  Probable pneumonia and/or chronic obstructive pulmonary disease exacerbation in the setting of end-stage chronic obstructive pulmonary disease on chronic oxygen.      Ms. Soulier seems to have had some respiratory distress, although she does not feel that there was much significant change.  She has  infiltrates on her chest imaging.  She is being treated for pneumonia and COPD exacerbation.  In the midst of this, she had a slightly elevated troponin with minimal trend up and then down.  I think this is likely just stress from her other medical illness.  It certainly does not seem to represent a primary cardiac issue.  She has no other cardiac symptoms.  She also had a normal stress echo with a normal EF just 1 month ago.  I do not think a coronary angiogram would be beneficial here.  I certainly do not think the risk of the procedure would be outweighed by any benefit as there does not seem to be a clear primary acute coronary syndrome.        In her situation, an angiogram and stenting would only be aimed at improving quality of life and she does not seem to be affected negatively by cardiac disease if there is any and the recently normal stress echo would suggest that are not any major ischemia causing lesions.      I discussed all these issues with the patient.  She very much would like to avoid an angiogram if at all possible.  At this point, I do not think we need to proceed with any further cardiac evaluation.      Thank you for the consult.  I will sign off.  Please page if there are any questions or concerns.         GEE FRANCIS MD             D: 2018   T: 2018   MT: OLINDA      Name:     SOULIER, KRISTINE   MRN:      [id number]        Account:       [id number]   :      1945           Consult Date:  2018      Document: [id number]       cc: Raffy Nelson MD

## 2018-07-07 NOTE — CONSULTS
Pulmonary Medicine Consultation        Date of Admission: 7/6/2018  Primary Attending:  Yuki Khan MD  Consulting Physician: Miguelito Godfrey MD      History:    Anay Is a very pleasant 73-year-old female well-known to our service who follows up with my partner Edgar Butler with multiple medical problems including severe oxygen dependent COPD, hypertension, arthritis, anxiety, she is being treated with a she uses pulmicort and Brovana nebulizers twice a day.  She denied any significant worsening of her baseline dyspnea, and was sent from clinic for hypoxemia.  CT scan of the chest done on admission revealed wrsening of pulmonary infiltrates in the right lower lobe and to a lesser extent in the lateral segment of the right middle lobe and central aspect of left upper lobe. No change in the rest of infiltrates with peripheral distribution.  She was initiated on higher dose of corticosteroids with Zosyn for  pneumonia, but was changed to ceftriaxone and azithro.  White blood cell count was elevated at 17.5.  She denied any chest pain, no hemoptysis, productive cough of clear sputum.  Denied palpitations or near syncopal events.  Were consulted for further management of her pneumonia and emphysema.      Review of system:   ROS is negative except for items mentioned above and in HPI.           Prior medical history:  Past Medical History:   Diagnosis Date     Allergic rhinitis      Anxiety      Arthritis      COPD (chronic obstructive pulmonary disease) (H)      Former smoker     quit 01/2014     HTN, goal below 140/80        Past Surgical History:   Procedure Laterality Date     BRONCHOSCOPY FLEXIBLE  4/16/2014    Procedure: bronchoscopy;  Surgeon: Ken Thomson MD;  Location:  GI     BRONCHOSCOPY FLEXIBLE AND RIGID  4/17/2014    Procedure: bronchoscopy;  Surgeon: Ken Thomson MD;  Location:  GI     BRONCHOSCOPY FLEXIBLE AND RIGID  4/18/2014    Procedure: bronchoscopy;  Surgeon:  Ken Thomson MD;  Location:  GI     HC COLONOSCOPY THRU STOMA, DIAGNOSTIC  2/26/10    with Colon Rectal spec. at [facility], repeat 10 yrs.       Patient Active Problem List   Diagnosis     CARDIOVASCULAR SCREENING; LDL GOAL LESS THAN 130     Seasonal allergic rhinitis     Advanced directives, counseling/discussion     Enlarged lymph nodes     End stage COPD (H)     Community acquired pneumonia     Health Care Home     Anxiety     HTN, goal below 140/80     Hilar adenopathy     Abdominal pain, generalized     RA (rheumatoid arthritis) (H)     Oxygen dependent     Positive sputum culture for Aspergillus (H) as per history      Mycobacterium avium infection (H) history      NSTEMI (non-ST elevated myocardial infarction) (H)     Chest pain     Hypoxia       Social History     Social History     Social History     Marital status:      Spouse name: N/A     Number of children: 5     Years of education: 12     Occupational History     retired       Self     Social History Main Topics     Smoking status: Former Smoker     Packs/day: 0.25     Years: 20.00     Types: Cigarettes     Quit date: 1/29/2014     Smokeless tobacco: Former User     Alcohol use No     Drug use: No     Sexual activity: Yes     Partners: Male     Other Topics Concern     Parent/Sibling W/ Cabg, Mi Or Angioplasty Before 65f 55m? No     Social History Narrative    Balanced Diet - tries to    Osteoporosis Prevention Measures - Dairy servings per day: 3-4    Regular Exercise -  No Describe no    Dental Exam - YES - Date: 10/09    Eye Exam - YES - Date: about a year ago    Self Breast Exam - she tries to    Abuse: Current or Past (Physical, Sexual or Emotional)- No    Do you feel safe in your environment - Yes    Guns stored in the home - No    Sunscreen used - No    Seatbelts used - Yes    Lipids -  YES - Date: years ago, was on lipitor for awhile    Glucose -  NO    Colon Cancer Screening - No, has not had a colonoscopy done before  and she does not want to have one done    Hemoccults - NO    Pap Test -  YES - Date: years ago    Do you have any concerns about STD's -  No    Mammography - YES - Date: years    DEXA - NO    Immunizations reviewed and up to date - last td given years ago    Rowena Parmar ma  2009                     Family History  Family History   Problem Relation Age of Onset     Asthma Mother      Diabetes Mother      Arthritis Mother      Circulatory Mother      blood clots, varicose veins     Genitourinary Problems Mother      kidney problems     Respiratory Mother      emphysema     Alcohol/Drug Father      alcohol     Arthritis Maternal Aunt      Arthritis Other      self           Medications  No current outpatient prescriptions on file.     Current Facility-Administered Medications Ordered in Epic   Medication Dose Route Frequency Last Rate Last Dose     acetaminophen (TYLENOL) tablet 325-650 mg  325-650 mg Oral Q4H PRN         albuterol neb solution 2.5 mg  3 mL Nebulization Q2H PRN         arformoterol (BROVANA) neb solution 15 mcg  15 mcg Nebulization BID   15 mcg at 07/06/18 1938     aspirin chewable tablet 81 mg  81 mg Oral Daily         azithromycin (ZITHROMAX) tablet 250 mg  250 mg Oral Daily         budesonide (PULMICORT) neb solution 0.5 mg  0.5 mg Nebulization BID   0.5 mg at 07/06/18 1938     Calcium carb-Vitamin D 500 mg [employer] [place]-200 units (OSCAL with D;Oyster Shell Calcium) per tablet 1 tablet  1 tablet Oral Daily         cefTRIAXone (ROCEPHIN) 1 g vial to attach to  mL bag for ADULTS or NS 50 mL bag for PEDS  1 g Intravenous Q24H   1 g at 07/06/18 1720     heparin drip 25,000 units in 0.45% NaCl 250 mL  750 Units/hr Intravenous Continuous 7.5 mL/hr at 07/07/18 0006 750 Units/hr at 07/07/18 0006     hydrOXYzine (ATARAX) tablet 10-20 mg  10-20 mg Oral TID PRN         ipratropium - albuterol 0.5 mg/2.5 mg/3 mL (DUONEB) neb solution 3 mL  3 mL Nebulization Q4H While awake   3 mL at 07/06/18 2338     lidocaine  (LMX4) cream   Topical Q1H PRN         lidocaine 1 % 1 mL  1 mL Other Q1H PRN         melatonin tablet 1 mg  1 mg Oral At Bedtime PRN         methylPREDNISolone sodium succinate (solu-MEDROL) injection 62.5 mg  62.5 mg Intravenous Q6H   62.5 mg at 18 0146     naloxone (NARCAN) injection 0.1-0.4 mg  0.1-0.4 mg Intravenous Q2 Min PRN         nitroGLYcerin (NITROSTAT) sublingual tablet 0.4 mg  0.4 mg Sublingual Q5 Min PRN         ondansetron (ZOFRAN-ODT) ODT tab 4 mg  4 mg Oral Q6H PRN        Or     ondansetron (ZOFRAN) injection 4 mg  4 mg Intravenous Q6H PRN         sodium chloride (PF) 0.9% PF flush 3 mL  3 mL Intracatheter Q1H PRN         sodium chloride (PF) 0.9% PF flush 3 mL  3 mL Intracatheter Q8H         sodium chloride 0.9% infusion   Intravenous Continuous 50 mL/hr at 18 1930       No current [facility]-ordered outpatient prescriptions on file.       No Known Allergies            Physical Examination:   Vitals:    18 0000 18 0200 18 0400 18 0500   BP: 137/70 124/66 138/72    BP Location:       Pulse:       Resp: 16 15 14 15   Temp:       TempSrc:       SpO2: 100% 100% 100% 100%   Weight:       Height:         Body mass index is 18.07 kg/(m^2).  Temp (24hrs), Av.1  F (36.7  C), Min:97.9  F (36.6  C), Max:98.4  F (36.9  C)         Constitutional:  Appears comfortable.Tachypneic but not dyspneic  HENT:  mucous membranes moist.  Eyes: PERRLA, no icterus, no pallor.   Neck: No lymphadenopathy or thyromegaly, trachea midline, no carotid bruits.  Cardiovascular: Tachycardic,Regular rate and rhythm  Respiratory/Chest: Resonant to percussion, coarse breath sounds bilaterally, prolonged expiratory phase.  Gastrointestinal: Abdomen was soft, non-tender, non-distended, no masses felt, no hepatosplenomegaly.  Musculoskeletal: No clubbing or cyanosis, full range of motion in all extremities.  Neurological: No focal motor or sensory deficits. DTR's are 2+ and symmetric.  Skin: No skin  rash, hives, petechiae, or breakdown.        CMP  Recent Labs  Lab 07/07/18  0610 07/06/18  1225    139   POTASSIUM 4.8 4.2   CHLORIDE 98 93*   CO2 39* 39*   ANIONGAP 4 7   * 121*   BUN 17 21   CR 0.40* 0.49*   GFRESTIMATED >90 >90   GFRESTBLACK >90 >90   ISADORA 8.3* 8.5   MAG  --  2.0   PROTTOTAL  --  8.9*   ALBUMIN  --  2.9*   BILITOTAL  --  0.3   ALKPHOS  --  75   AST  --  16   ALT  --  13     CBC  Recent Labs  Lab 07/07/18  0610 07/06/18  1225   WBC 7.9 17.5*   RBC 3.12* 3.41*   HGB 9.5* 10.5*   HCT 33.0* 36.1   * 106*   MCH 30.4 30.8   MCHC 28.8* 29.1*   RDW 13.9 14.0    348     INRNo lab results found in last 7 days.  Arterial Blood Gas  Recent Labs  Lab 07/06/18  1240   PH 7.32*   PCO2 85*   PO2 90   HCO3 44*       Recent Labs  Lab 07/06/18  1253 07/06/18  1225   CULT No growth after 11 hours No growth after 11 hours       Diagnostic Studies:  Chest Radiology:    CT CHEST PULMONARY EMBOLISM W CONTRAST   7/6/2018 2:25 PM      HISTORY: Worsening hypoxia and bilateral infiltrates on chest x-ray.         COMPARISON: Radiographs today and 6/17/2018. CT scan 5/16/2018.           IMPRESSION:  1. Worsening of pulmonary infiltrates in the right lower lobe and to a lesser extent in the lateral segment of the right middle lobe and  central aspect of left upper lobe. No change in the rest of infiltrates with peripheral distribution.  2. Resolution of mucous plugging in the bronchi of the basal segments of lower lobes.  3. No evidence of a pulmonary mass.  4. Upper lobe emphysema. The findings are consistent with pneumonia superimposed on chronic obstructive pulmonary disease.           Assessment:     73-year-old female well-known to our service who follows up with my partner Edgar Butler with multiple medical problems including severe oxygen dependent COPD, hypertension, arthritis, anxiety, she is being treated with a she uses pulmicort and Brovana nebulizers twice a day.  She denied any  significant worsening of her baseline dyspnea, and was sent from clinic for hypoxemia.  CT scan of the chest done on admission revealed worsening of pulmonary infiltrates in the right lower lobe and to a lesser extent in the lateral segment of the right middle lobe and central aspect of left upper lobe. Were consulted for further management of her pneumonia and emphysema. She is now at her baseline oxygenation      Pulmonary Diagnoses: Abnl CT/CXR R91.8, COPD J44.9, COPD exacerb J44.1, BASHIR R06.09, Hpoxemia R09.02, Pnemonia unspec J18.9 and SOB R06.02    Recommendations        Continue Duonebs every four hours scheduled  Sputum culture   Supplemental oxygen target pulse oxymetry >89%, wean as able  Brovana and pulmicort neb BID   Continue Abx complete 7-10 days de-escalate pending cultures  May transition to oral on discharge  Encourage IS Q4H  Continue solumedrol decrease to every 12 hours  Prednisone taper on discharge Give: 40 mg daily wm orally for 4 doses, Then give: 30 mg daily wm orally for 4 doses,  Then give: 20 mg daily wm orally for 4 doses,Then give: 10 mg daily wm orally for 3 doses.  Physical activity as tolerated  Arrange pulmonary follow up with Dr. Butler [phone]  Please call if any questions           Miguelito Espinoza M.D.  Pulmonary, Critical Care and Sleep Medicine  [facility]   Pager: [phone]  Office:[phone]

## 2018-07-07 NOTE — PROGRESS NOTES
Hospitalist cross cover note    Request received to initiate IV heparin therapeutic infusion.  Patient has emesis and is unable to tolerate her oral anticoagulation medication.  Order entered.    Ailyn Prakash MD

## 2018-07-07 NOTE — PLAN OF CARE
Problem: Patient Care Overview  Goal: Plan of Care/Patient Progress Review  Outcome: No Change  A&O x4, VSS, Tele SR, lung sound diminished crackles, neb treatment per RT, BASHIR, denies SOB at rest. Non-productive dry cough. Encouraged pulmonary toilet. Up with one assist. Denies any pain. Pt plans for Echo in am.

## 2018-07-07 NOTE — PLAN OF CARE
Problem: Patient Care Overview  Goal: Plan of Care/Patient Progress Review  Outcome: No Change  Pt stable over night on NC at 5L.  Pt denies SOB but appears to be BASHIR when up to the BR.  O2 sat's are in the high 90's.  LS have crackles throughout.  U/O was 300cc.  Pt will have an Echo today.  No new issues noted.  VSS.

## 2018-07-08 NOTE — PLAN OF CARE
Problem: Patient Care Overview  Goal: Plan of Care/Patient Progress Review  Outcome: No Change  Pt is A&O. Denies pain. Monitor shows ST. Coarse crackles with expiratory wheezes present bilaterally in lungs. Pt is coughing up sputum. O2 sats stable in low 90s on 4 LPM O2. Encouraged fluids and DBC. Pt c/o urinary urgency and frequency. UA negative. Encouraged fluids and increased activity. Pt is up to bathroom with SBA. Heparin dc'd. IVF stopped d/t elevated BP. Pt continues on abx and solumedrol. No definite dc plans.

## 2018-07-08 NOTE — PROGRESS NOTES
"  Pulmonary Medicine Progress Note        Date of Admission: 2018  Primary Attending:  Yuki Khan MD  Consulting Physician: Miguelito Godfrey MD      History:      SUBJECTIVE: Remains tahypneic but not dyspneic with desaturations with excertion, No new events overnight,  Afebrile. No worsening respiratory complaints at this time.      OBJECTIVE:    Heart Rate: 79, Blood pressure 172/85, pulse 120, temperature 97.2  F (36.2  C), temperature source Oral, resp. rate 20, height 1.6 m (5' 3\"), weight 48.2 kg (106 lb 3.2 oz), SpO2 94 %.  Body mass index is 18.81 kg/(m^2).  Temp (24hrs), Av  F (36.7  C), Min:97.2  F (36.2  C), Max:98.6  F (37  C)        Neck: No lymphadenopathy or thyromegaly, trachea midline, no carotid bruits.  Cardiovascular: Tachycardic,Regular rate and rhythm  Respiratory/Chest: Resonant to percussion, coarse breath sounds bilaterally, prolonged expiratory phase.  Gastrointestinal: Abdomen was soft, non-tender, non-distended, no masses felt, no hepatosplenomegaly.      Medications  Current Facility-Administered Medications Ordered in Epic   Medication Dose Route Frequency Last Rate Last Dose     acetaminophen (TYLENOL) tablet 325-650 mg  325-650 mg Oral Q4H PRN         albuterol neb solution 2.5 mg  3 mL Nebulization Q2H PRN         arformoterol (BROVANA) neb solution 15 mcg  15 mcg Nebulization BID   15 mcg at 1839     aspirin chewable tablet 81 mg  81 mg Oral Daily   81 mg at 18 0949     azithromycin (ZITHROMAX) tablet 250 mg  250 mg Oral Daily   250 mg at 18 0949     budesonide (PULMICORT) neb solution 0.5 mg  0.5 mg Nebulization BID   0.5 mg at 1839     Calcium carb-Vitamin D 500 mg [place]-200 units (OSCAL with D;Oyster Shell Calcium) per tablet 1 tablet  1 tablet Oral Daily   1 tablet at 18 0949     cefTRIAXone (ROCEPHIN) 1 g vial to attach to  mL bag for ADULTS or NS 50 mL bag for PEDS  1 g Intravenous Q24H 200 mL/hr at 18 1748 1 g " at 07/07/18 1748     hydrOXYzine (ATARAX) tablet 10-20 mg  10-20 mg Oral TID PRN         ipratropium - albuterol 0.5 mg/2.5 mg/3 mL (DUONEB) neb solution 3 mL  3 mL Nebulization Q4H While awake   3 mL at 07/08/18 0739     lidocaine (LMX4) cream   Topical Q1H PRN         lidocaine 1 % 1 mL  1 mL Other Q1H PRN         melatonin tablet 1 mg  1 mg Oral At Bedtime PRN         methylPREDNISolone sodium succinate (solu-MEDROL) injection 62.5 mg  62.5 mg Intravenous Q6H   62.5 mg at 07/08/18 0239     naloxone (NARCAN) injection 0.1-0.4 mg  0.1-0.4 mg Intravenous Q2 Min PRN         nitroGLYcerin (NITROSTAT) sublingual tablet 0.4 mg  0.4 mg Sublingual Q5 Min PRN         ondansetron (ZOFRAN-ODT) ODT tab 4 mg  4 mg Oral Q6H PRN        Or     ondansetron (ZOFRAN) injection 4 mg  4 mg Intravenous Q6H PRN         sodium chloride (PF) 0.9% PF flush 3 mL  3 mL Intracatheter Q1H PRN   3 mL at 07/07/18 1409     sodium chloride (PF) 0.9% PF flush 3 mL  3 mL Intracatheter Q8H         sodium chloride 0.9% infusion   Intravenous Continuous 50 mL/hr at 07/06/18 1930       No current [facility]-ordered outpatient prescriptions on file.           CMP  Recent Labs  Lab 07/07/18  0610 07/06/18  1225    139   POTASSIUM 4.8 4.2   CHLORIDE 98 93*   CO2 39* 39*   ANIONGAP 4 7   * 121*   BUN 17 21   CR 0.40* 0.49*   GFRESTIMATED >90 >90   GFRESTBLACK >90 >90   ISADORA 8.3* 8.5   MAG  --  2.0   PROTTOTAL  --  8.9*   ALBUMIN  --  2.9*   BILITOTAL  --  0.3   ALKPHOS  --  75   AST  --  16   ALT  --  13     CBC  Recent Labs  Lab 07/07/18  0610 07/06/18  1225   WBC 7.9 17.5*   RBC 3.12* 3.41*   HGB 9.5* 10.5*   HCT 33.0* 36.1   * 106*   MCH 30.4 30.8   MCHC 28.8* 29.1*   RDW 13.9 14.0    348     INRNo lab results found in last 7 days.  Arterial Blood Gas  Recent Labs  Lab 07/06/18  1240   PH 7.32*   PCO2 85*   PO2 90   HCO3 44*       Recent Labs  Lab 07/06/18  1253 07/06/18  1225   CULT No growth after 2 days No growth after 2 days              Diagnostic Studies:  Chest Radiology:CT CHEST PULMONARY EMBOLISM W CONTRAST   7/6/2018 2:25 PM       HISTORY: Worsening hypoxia and bilateral infiltrates on chest x-ray.           COMPARISON: Radiographs today and 6/17/2018. CT scan 5/16/2018.             IMPRESSION:  1. Worsening of pulmonary infiltrates in the right lower lobe and to a lesser extent in the lateral segment of the right middle lobe and  central aspect of left upper lobe. No change in the rest of infiltrates with peripheral distribution.  2. Resolution of mucous plugging in the bronchi of the basal segments of lower lobes.  3. No evidence of a pulmonary mass.  4. Upper lobe emphysema. The findings are consistent with pneumonia superimposed on chronic obstructive pulmonary disease.              Assessment:      73-year-old female well-known to our service who follows up with my partner Edgar Butler with multiple medical problems including severe oxygen dependent COPD, hypertension, arthritis, anxiety, she is being treated with a she uses pulmicort and Brovana nebulizers twice a day.  She denied any significant worsening of her baseline dyspnea, and was sent from clinic for hypoxemia.  CT scan of the chest done on admission revealed worsening of pulmonary infiltrates in the right lower lobe and to a lesser extent in the lateral segment of the right middle lobe and central aspect of left upper lobe. Were consulted for further management of her pneumonia and emphysema. She is now at her baseline oxygenation, but desaturates with movement        Pulmonary Diagnoses: Abnl CT/CXR R91.8, COPD J44.9, COPD exacerb J44.1, BASHIR R06.09, Hpoxemia R09.02, Pnemonia unspec J18.9 and SOB R06.02     Recommendations       With her severe Lung disease , recovery (if any ) will be slow  Continue Duonebs every four hours scheduled  Sputum culture   Supplemental oxygen target pulse oxymetry >89%, wean as able  Brovana and pulmicort neb BID   Continue Abx  complete 7-10 days de-escalate pending cultures  Continue solumedrol  IS Q4H  Continue solumedrol schedule  every 12 hours  Prednisone taper on discharge Give: 40 mg daily wm orally for 4 doses, Then give: 30 mg daily wm orally for 4 doses,  Then give: 20 mg daily wm orally for 4 doses,Then give: 10 mg daily wm orally until her next pulmonary appointment  Physical activity as tolerated  Arrange pulmonary follow up with Dr. Butler [phone]  Please call if any questions           Miguelito Espinoza M.D.  Pulmonary, Critical Care and Sleep Medicine  [facility]  Pager: [phone]  Office:[phone]

## 2018-07-08 NOTE — PLAN OF CARE
Problem: Patient Care Overview  Goal: Plan of Care/Patient Progress Review  Outcome: No Change  Pt slept quite well overnight. Plan is to continue IV antibiotics, IV methylprednisolone; once breathing stabilized, can discharge. Pt quite tachypneic and SOB, desat to mid 80's on 4LPM NC while talking. Desat to mid 60's when up for morning weight and transfer to commode.     CNS: Pt A&O X 4; denies pain. Experiences anxiety with SOB.  CVS: SR to ST 80's to low 100's. No peripheral edema. Pale.   RESP: 4LPM NC (baseline home O2 4-5 LPM). Desat to 80's with short conversation, and desat to 60's with transfer to commode. Decreased A/E to R lower lung field; coarse crackles throughout. At times insp/exp wheezes.   GI: decreased appetite due to abdominal fullness from SOB.   : concentrated urine, low output overnight. Needs encouragement to drink water.   SKIN: no concerns.  MOBILITY: 1PA to commode/bathroom.

## 2018-07-08 NOTE — PROGRESS NOTES
"[facility]  Hospitalist Progress Note        Raffy Pendleton MD  07/08/2018        Interval History:       patient fees much better today. No bowel or bladder complaints, No nausea or vomiting.         Assessment and Plan:        suspected PNA.  Pulmonary followinsg   - -  Continue empiric Ceftriaxone and Azithromycin for 7 - 10 day course       Acute on chronic COPD exacerbation:    - - Continue supplemental oxygen, (on oxygen at home)  - -  IV steroids converted to oral prednisone 40 mg daily.  - -  steroid taper at discharge; 40 mg daily wm orally for 4 doses, Then give: 30 mg daily wm orally for 4 doses,  Then give: 20 mg daily wm orally for 4 doses,Then give: 10 mg daily wm orally for 3 doses   - - remote history Aspergillosis and sya MELISA     Elevated troponin level: likely demand ischemia per Cardiology.    New Q waves on inferior leads are new June of 2018. Has chemical stress test on June 2018. Less concern for ACS presently, however her  clinical presentations ins not completely explained by lung process. patient ha no chest pain at the present time   - - Continue PTA cardiac mediations  - -  Cardiology following     History of rheumatoid arthritis.  The patient has some changes in her hands consistent with chronic rheumatoid arthritis.        DVT prophylaxis; Heparin   CODE: FULL   dispo; Pending clinical improvement , likely 1 to 2 more days                      Physical Exam:      Heart Rate: 111, Blood pressure (!) 160/92, pulse 120, temperature 97.2  F (36.2  C), temperature source Oral, resp. rate 26, height 1.6 m (5' 3\"), weight 48.2 kg (106 lb 3.2 oz), SpO2 96 %.  Vitals:    07/06/18 1330 07/07/18 0500 07/08/18 0500   Weight: 46.3 kg (102 lb) 48.2 kg (106 lb 4.2 oz) 48.2 kg (106 lb 3.2 oz)     Vital Signs with Ranges  Temp:  [97.2  F (36.2  C)-98.6  F (37  C)] 97.2  F (36.2  C)  Heart Rate:  [] 111  Resp:  [10-29] 26  BP: (144-172)/(73-96) 160/92  SpO2:  [86 %-100 %] " 96 %  I/O's Last 24 hours  I/O last 3 completed shifts:  In: 533 [P.O.:530; I.V.:3]  Out: 575 [Urine:575]    Constitutional:  Cachectic appearing woman, in NAD    Lungs:  Decreased breath sounds through out, prolonged exp, phase of breathiong, no crackles   Cardiovascular:  S1 S2 heard no murmuirs   Abdomen: Soft, NT/ND = BS    SKIN Warm dry no rashes   Other:           Medications:          arformoterol  15 mcg Nebulization BID     aspirin  81 mg Oral Daily     azithromycin  250 mg Oral Daily     budesonide  0.5 mg Nebulization BID     Calcium carb-Vitamin D 500 mg [place]-200 units  1 tablet Oral Daily     cefTRIAXone  1 g Intravenous Q24H     ipratropium - albuterol 0.5 mg/2.5 mg/3 mL  3 mL Nebulization Q4H While awake     methylPREDNISolone  62.5 mg Intravenous Q6H     sodium chloride (PF)  3 mL Intracatheter Q8H     PRN Meds: acetaminophen (TYLENOL) tablet 325-650 mg, albuterol, hydrOXYzine, lidocaine 4%, lidocaine (buffered or not buffered), melatonin, naloxone, nitroGLYcerin, ondansetron **OR** ondansetron, sodium chloride (PF)         Data:      All new lab and imaging data was reviewed.   Recent Labs   Lab Test  07/07/18   0610  07/06/18   1225  06/18/18   0651   WBC  7.9  17.5*  12.4*   HGB  9.5*  10.5*  9.8*   MCV  106*  106*  102*   PLT  272  348  345      Recent Labs   Lab Test  07/07/18   0610  07/06/18   1225  06/18/18   0651   NA  141  139  138   POTASSIUM  4.8  4.2  4.2   CHLORIDE  98  93*  98   CO2  39*  39*  37*   BUN  17  21  9   CR  0.40*  0.49*  0.36*   ANIONGAP  4  7  3   ISADORA  8.3*  8.5  8.0*   GLC  128*  121*  89     Recent Labs   Lab Test  07/07/18   0610  07/07/18   0145  07/06/18   2200   TROPI  0.284*  0.294*  0.352*

## 2018-07-09 NOTE — PLAN OF CARE
Problem: Patient Care Overview  Goal: Plan of Care/Patient Progress Review  Outcome: No Change  Patient A&Ox4.  Up SBA.  VSS.  Denies pain.  On 4 liters oxygen, uses oxygen at home.  SOB with exertion.  Lungs coarse.  Nonproductive cough.  On scheduled nebs, prednisone, Zithromax, and IV Rocephin.  Telemetry Sinus tachycardia HR 90's-low 100's.  Bed alarm on.  Calls appropriately.  Continue to monitor.

## 2018-07-09 NOTE — PLAN OF CARE
Problem: Pneumonia (Adult)  Goal: Signs and Symptoms of Listed Potential Problems Will be Absent, Minimized or Managed (Pneumonia)  Signs and symptoms of listed potential problems will be absent, minimized or managed by discharge/transition of care (reference Pneumonia (Adult) CPG).   Outcome: Improving  Pt is A&O and [place]. Denies pain. Rhythm is SR/ST. Pt continues to report fatigue and severe SOB. LS coarse with inspiratory and expiratory wheezes. BP elevated with SBP up to 190s. Given PRN hydralazine x1 with minimal results. Will start Prednisone and continue with nebs. Up with SBA.

## 2018-07-09 NOTE — PLAN OF CARE
Problem: Patient Care Overview  Goal: Plan of Care/Patient Progress Review  PT: PT orders received, initial eval completed and treatment initiated. Patient lives alone in a senior high rise apartment with elevator access. Previously independent with all ADLs and mobility without a device. Does her own cleaning and cooking, takes a facility van for groceries. On 4L O2 NC at baseline. Pt was admitted from a clinic with hypoxia and worsening pulmonary infiltrates, elevated trops to 0.352 now down trending.    Discharge Planner PT   Patient plan for discharge: home  Current status: Resting O2 sats 93% on 4L O2 NC. Pt is independent with bed mobility and transfers; requires SBA for gait 100' x 2 on 6L O2 NC with assist for managing the portable O2 tank. O2 sats did drop to 86% requiring a ~90 second seated rest with cues for PLB to recover to 90%.   Barriers to return to prior living situation: lives alone, increased O2 need  Recommendations for discharge: home with home PT   Rationale for recommendations: Pt is mobilizing well but would benefit from home PT to progress and monitor her return to activity so that she safely returns to her PLOF       Entered by: Nuvia Orellana 07/09/2018 5:02 PM

## 2018-07-09 NOTE — CONSULTS
"CLINICAL NUTRITION SERVICES  -  ASSESSMENT NOTE      Recommendations Ordered by Registered Dietitian (RD):   Boost (chocoalte) 10 am daily    Malnutrition:   % Weight Loss:  Weight loss does not meet criteria for malnutrition - see wt tending  % Intake:  No decreased intake noted  Subcutaneous Fat Loss:  Chronic - does not meet criteria   Muscle Loss:  Chronic - does not meet criteria   Fluid Retention:  None noted    Malnutrition Diagnosis: Patient does not meet two of the above criteria necessary for diagnosing malnutrition          REASON FOR ASSESSMENT  Kristine L Soulier is a 73 year old female seen by Registered Dietitian for Nutrition Attila <3      NUTRITION HISTORY  - Information obtained from patient:  - Pt lives in a senior facility where she provides meals for self 2-3 times daily. She obtains groceries by taking a communal van from the facility once every other week.  - Typical intake includes:   Breakfast: Cheerio's, toast, hard boiled eggs, or Kashmir Behzad breakfast sandwiches   Lunch: Sometimes skips or snacks on cheese and crackers   Dinner: Cooks a large meal on Sunday for the week (lasagna, ribs, etc)   Snacks: Ice cream every night    - Pt reports that since she retired 7 yrs ago (2011), she has lost her larger appetite, lost weight, and lost muscle mass. This is not a recent concern. She states the she reminds herself to eat 3 times a day and walk when she's able, to maintain weight and muscle mass.      CURRENT NUTRITION ORDERS  Diet Order:     Regular     Current Intake/Tolerance:  Pt has consumed % of meals this admit.     PHYSICAL FINDINGS  Observed  Clavicle and temple area with moderate-severe fat and muscle losses - This is chronic  Obtained from Chart/Interdisciplinary Team  Cachectic-appearing female    ANTHROPOMETRICS  Height: 5' 3\"  Weight: 104 lbs 11.2 oz (47.5 kg)  Body mass index is 18.55 kg/(m^2).  Weight Status:  Normal BMI, borderline underweight   IBW: 52.3 kg  % IBW: " 91%  Weight History: There has been no recent weight changes. Since 2015, pt has lost 24# or 19% (over 3 yrs). Pt reports that she has no idea why she lost weight.   Wt Readings from Last 10 Encounters:   07/09/18 47.5 kg (104 lb 11.2 oz)   06/18/18 45.2 kg (99 lb 11.2 oz)   06/08/18 46.5 kg (102 lb 8 oz)   05/25/18 46.5 kg (102 lb 8 oz)   05/17/18 47.9 kg (105 lb 9.6 oz)   05/14/18 47.9 kg (105 lb 8 oz)   11/03/17 47.6 kg (105 lb)   07/11/17 50 kg (110 lb 4 oz)   07/08/15 58.1 kg (128 lb)   05/18/15 58.7 kg (129 lb 8 oz)       LABS  Labs reviewed    MEDICATIONS  Medications reviewed      ASSESSED NUTRITION NEEDS PER APPROVED PRACTICE GUIDELINES:    Dosing Weight 47.5 kg (actual)  Estimated Energy Needs: 6568-1862 kcals (30-35 Kcal/Kg)  Justification: underweight  Estimated Protein Needs: 57-85 grams protein (1.2-1.8 g pro/Kg)  Justification: Repletion and preservation of lean body mass    MALNUTRITION:  % Weight Loss:  Weight loss does not meet criteria for malnutrition - see wt tending  % Intake:  No decreased intake noted  Subcutaneous Fat Loss:  Chronic - does not meet criteria   Muscle Loss:  Chronic - does not meet criteria   Fluid Retention:  None noted    Malnutrition Diagnosis: Patient does not meet two of the above criteria necessary for diagnosing malnutrition      NUTRITION DIAGNOSIS:  Predicted inadequate nutrient intake (protein/calorie) related to slow weight loss over 3 yrs and reported decreased appetite over 7 years      NUTRITION INTERVENTIONS  Recommendations / Nutrition Prescription  Continue regular diet as tolerated    Boost (chocolate) 10 am daily       Implementation  Nutrition education: Provided education on meal composition and encouraged pt to consume meals TID with the addition on supplements if desired for snacks.   Medical Food Supplement - as above  .      Nutrition Goals  Pt to consume >75% of meals TID with at least 1 supplement daily  .      MONITORING AND EVALUATION:  Progress  towards goals will be monitored and evaluated per protocol and Practice Guidelines              Kelli Trimble, Nutrition Services

## 2018-07-09 NOTE — PLAN OF CARE
"Problem: Patient Care Overview  Goal: Plan of Care/Patient Progress Review  Outcome: No Change  Pt slept quite well overnight. Pt now on PO prednisone. Patient wants to stay another day, as wants to try more activity to see if she tolerates activity with breathing. Anxious about managing at home, lives independently in senior home although has daughter close by and neighbor who could help if need be . PRN atarax X 1 given overnight.   Pt needs an \"O2 concentrator\" at home that is capable of delivering 5 LPM of O2; currently her concentrator can only deliver 4 LPM. Social work not following patient.  Pt SBP high overnight, 150's to 170's. Antihypertensive agent?      CNS: Pt A&O X 4; denies pain. Experiences anxiety with SOB. Given X 1 dose atarax at bedtime, which helped pt relax.   CVS: SR to ST 80's to low 100's. SBP high in 150's to 170's; asymptomatic. No peripheral edema.  RESP: 4LPM NC (baseline home O2 4-5 LPM). Desat to 80's with short conversation, and desat to 60's with transfer to commode. Decreased A/E to R lower lung field; coarse crackles throughout. At times insp/exp wheezes.   GI: decreased appetite due to abdominal fullness from SOB. Encouraged to eat calorie dense foods; awaiting nutrition consult.   : Voided well overnight per commode/bathroom. Over 1 L out.   SKIN: no concerns.  MOBILITY: 1P standby to commode/bathroom.          "

## 2018-07-09 NOTE — CONSULTS
Care Transition Initial Assessment - RN        Met with: Patient.  DATA   Active Problems:    Hypoxia       Cognitive Status: alert.        Contact information and PCP information verified: Yes  Lives With: alone                    Insurance concerns: No Insurance issues identified  ASSESSMENT  Patient currently receives the following services:  None. She is independent with groceries, cooking bathing. She uses [employer] for transportation       Identified issues/concerns regarding health management: patient needs new oxygen concentrator that allows for increased flow    PLAN  Financial costs for the patient include none .  Patient given options and choices for discharge yes .  Patient/family is agreeable to the plan?  Yes  Patient anticipates discharging to home. [facility] .        Patient anticipates needs for home equipment: Yes. Currently has home Oxygen thru [facility] [employer] [phone]  Plan/Disposition: Home   Appointments: tbd     Care  (CTS) will continue to follow as needed.

## 2018-07-09 NOTE — PROGRESS NOTES
"  Pulmonary Medicine Progress Note        Date of Admission: 2018  Primary Attending:  Yuki Khan MD  Consulting Physician: Miguelito Godfrey MD      History:      SUBJECTIVE: better today, on home o2 @ 4L. Moving around some. Describes plan to d/c to home soon.     OBJECTIVE:    Heart Rate: 93, Blood pressure 153/83, pulse 120, temperature 97.8  F (36.6  C), temperature source Oral, resp. rate (!) 33, height 1.6 m (5' 3\"), weight 47.5 kg (104 lb 11.2 oz), SpO2 95 %.  Body mass index is 18.55 kg/(m^2).  Temp (24hrs), Av  F (36.7  C), Min:97.2  F (36.2  C), Max:98.6  F (37  C)      Neck: No lymphadenopathy or thyromegaly, trachea midline, no carotid bruits.  Cardiovascular: Tachycardic,Regular rate and rhythm  Respiratory/Chest: Resonant to percussion, coarse breath sounds bilaterally, prolonged expiratory phase.  Gastrointestinal: Abdomen was soft, non-tender, non-distended, no masses felt, no hepatosplenomegaly.      Medications  Current Facility-Administered Medications Ordered in Epic   Medication Dose Route Frequency Last Rate Last Dose     acetaminophen (TYLENOL) tablet 325-650 mg  325-650 mg Oral Q4H PRN         albuterol neb solution 2.5 mg  3 mL Nebulization Q2H PRN         arformoterol (BROVANA) neb solution 15 mcg  15 mcg Nebulization BID   15 mcg at 18     aspirin chewable tablet 81 mg  81 mg Oral Daily   81 mg at 18     azithromycin (ZITHROMAX) tablet 250 mg  250 mg Oral Daily   250 mg at 18     bisacodyl (DULCOLAX) Suppository 10 mg  10 mg Rectal Daily PRN         budesonide (PULMICORT) neb solution 0.5 mg  0.5 mg Nebulization BID   0.5 mg at 18     Calcium carb-Vitamin D 500 mg [place]-200 units (OSCAL with D;Oyster Shell Calcium) per tablet 1 tablet  1 tablet Oral Daily   1 tablet at 18     cefTRIAXone (ROCEPHIN) 1 g vial to attach to  mL bag for ADULTS or NS 50 mL bag for PEDS  1 g Intravenous Q24H 200 mL/hr at 18 2170 " 1 g at 07/08/18 1809     hydrALAZINE (APRESOLINE) injection 10 mg  10 mg Intravenous Q6H PRN   10 mg at 07/08/18 1815     hydrOXYzine (ATARAX) tablet 10-20 mg  10-20 mg Oral TID PRN   10 mg at 07/08/18 2152     ipratropium - albuterol 0.5 mg/2.5 mg/3 mL (DUONEB) neb solution 3 mL  3 mL Nebulization Q4H While awake   3 mL at 07/08/18 2023     labetalol (NORMODYNE/TRANDATE) injection 10 mg  10 mg Intravenous Q2H PRN         lidocaine (LMX4) cream   Topical Q1H PRN         lidocaine 1 % 1 mL  1 mL Other Q1H PRN         melatonin tablet 1 mg  1 mg Oral At Bedtime PRN         naloxone (NARCAN) injection 0.1-0.4 mg  0.1-0.4 mg Intravenous Q2 Min PRN         nitroGLYcerin (NITROSTAT) sublingual tablet 0.4 mg  0.4 mg Sublingual Q5 Min PRN         ondansetron (ZOFRAN-ODT) ODT tab 4 mg  4 mg Oral Q6H PRN        Or     ondansetron (ZOFRAN) injection 4 mg  4 mg Intravenous Q6H PRN         polyethylene glycol (MIRALAX/GLYCOLAX) Packet 17 g  17 g Oral BID PRN         predniSONE (DELTASONE) tablet 40 mg  40 mg Oral Daily   40 mg at 07/08/18 1957     senna-docusate (SENOKOT-S;PERICOLACE) 8.6-50 MG per tablet 1 tablet  1 tablet Oral At Bedtime         sodium chloride (PF) 0.9% PF flush 3 mL  3 mL Intracatheter Q1H PRN   3 mL at 07/08/18 1502     sodium chloride (PF) 0.9% PF flush 3 mL  3 mL Intracatheter Q8H   3 mL at 07/08/18 2152     No current Epic-ordered outpatient prescriptions on file.           CMP    Recent Labs  Lab 07/07/18  0610 07/06/18  1225    139   POTASSIUM 4.8 4.2   CHLORIDE 98 93*   CO2 39* 39*   ANIONGAP 4 7   * 121*   BUN 17 21   CR 0.40* 0.49*   GFRESTIMATED >90 >90   GFRESTBLACK >90 >90   ISADORA 8.3* 8.5   MAG  --  2.0   PROTTOTAL  --  8.9*   ALBUMIN  --  2.9*   BILITOTAL  --  0.3   ALKPHOS  --  75   AST  --  16   ALT  --  13     CBC    Recent Labs  Lab 07/07/18  0610 07/06/18  1225   WBC 7.9 17.5*   RBC 3.12* 3.41*   HGB 9.5* 10.5*   HCT 33.0* 36.1   * 106*   MCH 30.4 30.8   MCHC 28.8* 29.1*    RDW 13.9 14.0    348     INRNo lab results found in last 7 days.  Arterial Blood Gas    Recent Labs  Lab 07/06/18  1240   PH 7.32*   PCO2 85*   PO2 90   HCO3 44*       Recent Labs  Lab 07/06/18  1253 07/06/18  1225   CULT No growth after 3 days No growth after 3 days             Diagnostic Studies:  Chest Radiology:  1. Worsening of pulmonary infiltrates in the right lower lobe and to a lesser extent in the lateral segment of the right middle lobe and  central aspect of left upper lobe. No change in the rest of infiltrates with peripheral distribution.  2. Resolution of mucous plugging in the bronchi of the basal segments of lower lobes.  3. No evidence of a pulmonary mass.  4. Upper lobe emphysema. The findings are consistent with pneumonia superimposed on chronic obstructive pulmonary disease.              Assessment:      73-year-old female well-known to our service who follows with my partner Edgar Butler with multiple medical problems including severe oxygen dependent COPD, hypertension, arthritis, anxiety. She uses pulmicort and Brovana nebulizers twice a day.  She denied any significant worsening of her baseline dyspnea, and was sent from clinic for hypoxemia.  CT scan of the chest done on admission revealed worsening of pulmonary infiltrates in the right lower lobe and to a lesser extent in the lateral segment of the right middle lobe and central aspect of left upper lobe.  She is now at her baseline oxygenation, but desaturates with movement.        Pulmonary Diagnoses: Abnl CT/CXR R91.8, COPD J44.9, COPD exacerb J44.1, BASHIR R06.09, Hpoxemia R09.02, Pnemonia unspec J18.9 and SOB R06.02     Recommendations       With her severe Lung disease , recovery (if any ) will be slow and she is high risk for readmission. Seems to be near her baseline. Very frail. Would defer initiation of Daliresp to her outpt pulmonologist.  Ok to change duonebs to prn   Supplemental oxygen target pulse oxymetry ~88%, wean  as able (on 4L baseline @ home)  Brovana and pulmicort neb BID   Continue Abx complete 7-10 days  IS Q4H  Steroid taper: prednisone 40mg/d x 4d and decrease by 10mg every 4d.  Arrange pulmonary follow up with Dr. Butler [phone]  Likely home soon. No further suggestions. Please call if needed        Kehinde Rueda  [facility]  Office: [phone]  Pager: [phone]

## 2018-07-09 NOTE — PROGRESS NOTES
" 07/09/18 1600   Quick Adds   Type of Visit Initial PT Evaluation   Living Environment   Lives With alone   Living Arrangements apartment   Home Accessibility no concerns   Number of Stairs to Enter Home 0   Number of Stairs Within Home 0   Transportation Available family or friend will provide;other (see comments)   Living Environment Comment Pt lives in a senior high rise, van transportation, elevator access   Self-Care   Dominant Hand right   Usual Activity Tolerance moderate   Current Activity Tolerance fair   Regular Exercise yes   Activity/Exercise Type walking;strength training   Exercise Amount/Frequency 3-5 times/wk   Activity/Exercise/Self-Care Comment Pt walks the hallways for exercise when feeling well, goes to a chair exercise class in her building   Functional Level Prior   Ambulation 0-->independent   Transferring 0-->independent   Toileting 0-->independent   Bathing 0-->independent   Dressing 0-->independent   Eating 0-->independent   Communication 0-->understands/communicates without difficulty   Swallowing 0-->swallows foods/liquids without difficulty   Cognition 0 - no cognition issues reported   Fall history within last six months no   Prior Functional Level Comment 4L O2 NC at baseline   General Information   Onset of Illness/Injury or Date of Surgery - Date 07/06/18   Referring Physician Alice Mortensen MD   Patient/Family Goals Statement \"Go home\"   Pertinent History of Current Problem (include personal factors and/or comorbidities that impact the POC) 73 YOF admitted from a clinic with hypoxia and worsening pulmonary infiltrates. Trops also elevated to 0.352 now down trending. Pt was previously hospitalized 6/17/18-6/18/18 with chest pain and mildly elevated trops to 0.094, EF 65-70%. PMH: end stage COPD on 4L O2 NC at home, NSTEMI, HTN, RA   Precautions/Limitations fall precautions;oxygen therapy device and L/min  (O2 sats 93% on 4L O2 NC at rest)   Weight-Bearing Status - LUE full " weight-bearing   Weight-Bearing Status - RUE full weight-bearing   Weight-Bearing Status - LLE full weight-bearing   Weight-Bearing Status - RLE full weight-bearing   General Observations Pleasant and cooperative   General Info Comments Activity: ambulate with assist   Cognitive Status Examination   Orientation orientation to person, place and time   Level of Consciousness alert   Follows Commands and Answers Questions 100% of the time;able to follow multistep instructions   Personal Safety and Judgment intact   Memory intact   Cognitive Comment no concerns   Pain Assessment   Patient Currently in Pain No   Range of Motion (ROM)   ROM Comment BLEs WFL   Strength   Strength Comments BLEs grossly 4+/5 throughout   Bed Mobility   Bed Mobility Comments Independent supine<>sit   Transfer Skills   Transfer Comments Independent sit<>stand without AD   Gait   Gait Comments Gait x 10' with SBA, wide MARY KAY, decreased step length B, slow pce   Balance   Balance Comments Good sitting, reaches out for UE support with NBOS tasks or hallway ambulation   Sensory Examination   Sensory Perception no deficits were identified   Coordination   Coordination no deficits were identified   General Therapy Interventions   Planned Therapy Interventions balance training;strengthening;gait training;progressive activity/exercise;home program guidelines   Clinical Impression   Criteria for Skilled Therapeutic Intervention yes, treatment indicated   PT Diagnosis Impaired gait   Influenced by the following impairments SOB, fatigue, weakness   Functional limitations due to impairments gait   Clinical Presentation Stable/Uncomplicated   Clinical Presentation Rationale see above   Clinical Decision Making (Complexity) Low complexity   Therapy Frequency` daily   Predicted Duration of Therapy Intervention (days/wks) 3 days   Anticipated Discharge Disposition Home with Home Therapy   Risk & Benefits of therapy have been explained Yes   Patient, Family &  "other staff in agreement with plan of care Yes   [facility] TM \"6 Clicks\"   2016, Trustees of [facility], under license to [employer].  All rights reserved.   6 Clicks Short Forms Basic Mobility Inpatient Short Form   [facility]  \"6 Clicks\" V.2 Basic Mobility Inpatient Short Form   1. Turning from your back to your side while in a flat bed without using bedrails? 4 - None   2. Moving from lying on your back to sitting on the side of a flat bed without using bedrails? 4 - None   3. Moving to and from a bed to a chair (including a wheelchair)? 4 - None   4. Standing up from a chair using your arms (e.g., wheelchair, or bedside chair)? 4 - None   5. To walk in hospital room? 3 - A Little   6. Climbing 3-5 steps with a railing? 2 - A Lot   Basic Mobility Raw Score (Score out of 24.Lower scores equate to lower levels of function) 21   Total Evaluation Time   Total Evaluation Time (Minutes) 10     "

## 2018-07-09 NOTE — PROGRESS NOTES
[facility]    Hospitalist Progress Note      Assessment & Plan   Kristine L Soulier is a 73 year old female with a hx of end stage COPD on chornic O2 supplementation, RA, HTN not on meds who was sent to the ED due to hypoxia on 6L oxygen. She is admitted for management of pneumonia and COPD exacerbation.     Acute on chronic hypoxia secondary to suspected multilobar pneumonia and COPD exacerbation:    CT of the chest was negative for PE, but does show worsening pulm infiltrated in the RLL and RML and GEOVANI.    -- continue with ceftriaxone and zithromax  -- continue supplemental oxygen    Acute on chronic COPD exacerbation  -- Solumedrol changed to Prednisone 40mg daily and plan to taper by 10mg q4 days until she reaches 10mg daily and follow up with pulmonary for further tapering  -- continue with Brovana BID and Pulmicort BID  -- continue with Duoneb q4hrs  -- continue supplemental oxygen  -- needs f/u appt with pulm after discharge  -- appreciate pulm follow uup in hospital    Troponin elevation  -- likely demand ischemia in the setting of COPD exacerbation and pneumonia.  -- seen by cardiology and felt less likely to be ACS  -- TTE shows LVEF of 60-65%, no wall motion abnormalities    Hx of rheumatoid arthritis  -- not on any treatment    # Pain Assessment:  Current Pain Score 7/9/2018   Patient currently in pain? denies   Pain score (0-10) -   Pain descriptors -   CPOT pain score -   Anay nuno pain level was assessed and she currently denies pain.      DVT Prophylaxis: Pneumatic Compression Devices  Code Status: Special Code    Disposition: Expected discharge likely tomorrow. Will transfer to medical floor today. PT eval to assess discharge needs and given reported weakness    Alice Mortensen    Interval History   Breathing is stable. She reports feeling very weak. Denies chest pain, nausea, or abdominal pain. Denies constipation     -Data reviewed today: I reviewed all new labs and imaging  results over the last 24 hours. I personally reviewed the chest CT image(s) showing see above and the echo report image(s) showing see above.    Physical Exam   Temp: 98  F (36.7  C) Temp src: Oral BP: (!) 147/96   Heart Rate: 100 Resp: (!) 34 SpO2: 98 % O2 Device: Nasal cannula Oxygen Delivery: 4 LPM  Vitals:    07/07/18 0500 07/08/18 0500 07/09/18 0500   Weight: 48.2 kg (106 lb 4.2 oz) 48.2 kg (106 lb 3.2 oz) 47.5 kg (104 lb 11.2 oz)     Vital Signs with Ranges  Temp:  [97.5  F (36.4  C)-98.7  F (37.1  C)] 98  F (36.7  C)  Heart Rate:  [] 100  Resp:  [12-34] 34  BP: (147-196)/() 147/96  SpO2:  [87 %-100 %] 98 %  I/O last 3 completed shifts:  In: 470 [P.O.:470]  Out: 1650 [Urine:1650]    Constitutional: Alert, awake and no apparent distress  Respiratory: Diminished air movement bilaterally, no wheezing  Cardiovascular: regular rate and rhythm  GI: soft and non-tender  Skin/Integumen: warm and dry  Other:      Medications       arformoterol  15 mcg Nebulization BID     aspirin  81 mg Oral Daily     azithromycin  250 mg Oral Daily     budesonide  0.5 mg Nebulization BID     Calcium carb-Vitamin D 500 mg [place]-200 units  1 tablet Oral Daily     cefTRIAXone  1 g Intravenous Q24H     ipratropium - albuterol 0.5 mg/2.5 mg/3 mL  3 mL Nebulization Q4H While awake     predniSONE  40 mg Oral Daily     senna-docusate  1 tablet Oral At Bedtime     sodium chloride (PF)  3 mL Intracatheter Q8H       Data     Recent Labs  Lab 07/07/18  0610 07/07/18  0145 07/06/18  2200  07/06/18  1225   WBC 7.9  --   --   --  17.5*   HGB 9.5*  --   --   --  10.5*   *  --   --   --  106*     --   --   --  348     --   --   --  139   POTASSIUM 4.8  --   --   --  4.2   CHLORIDE 98  --   --   --  93*   CO2 39*  --   --   --  39*   BUN 17  --   --   --  21   CR 0.40*  --   --   --  0.49*   ANIONGAP 4  --   --   --  7   ISADORA 8.3*  --   --   --  8.5   *  --   --   --  121*   ALBUMIN  --   --   --   --  2.9*    PROTTOTAL  --   --   --   --  8.9*   BILITOTAL  --   --   --   --  0.3   ALKPHOS  --   --   --   --  75   ALT  --   --   --   --  13   AST  --   --   --   --  16   TROPI 0.284* 0.294* 0.352*  < > 0.333*   < > = values in this interval not displayed.    No results found for this or any previous visit (from the past 24 hour(s)).

## 2018-07-09 NOTE — PROGRESS NOTES
Attempted to make follow up appointment 2 weeks with Dr Carrie Oliver Lung. No appointments available with any of the providers until 8/15. Appointment made with Dr Mayer for that day @ 1:15. Patient is on list for any cancellations. PCP appointment made for 7/16.  Call out to [employer]- patient requesting a new concentrator that will allow oxygen delivery over 4 L. Awaiting call back from [employer] [phone]    Return call from [employer]:  In order for patient to have a concentrator that allows for higher than 4 L flow- patient will need an MD order for that upon discharge.

## 2018-07-10 NOTE — PROGRESS NOTES
[facility]    Hospitalist Progress Note      Assessment & Plan   Kristine L Soulier is a 73 year old female with a hx of end stage COPD on chornic O2 supplementation, RA, HTN not on meds who was sent to the ED due to hypoxia on 6L oxygen. She is admitted for management of pneumonia and COPD exacerbation.     Acute on chronic hypoxia secondary to suspected multilobar pneumonia and COPD exacerbation:    CT of the chest was negative for PE, but does show worsening pulm infiltrated in the RLL and RML and GEOVANI.    -- continue with ceftriaxone. Completed 5 day of Azithromycin.   -- needs aggressive pulm hygiene. Ordered flutter valve and Mucinex BID  -- wean down oxygen to baseline O2 ~4L    Acute on chronic COPD exacerbation  -- Solumedrol changed to Prednisone 40mg daily and plan to taper by 10mg q4 days until she reaches 10mg daily and follow up with pulmonary for further tapering  -- continue with Brovana BID and Pulmicort BID  -- continue with Duoneb q4hrs  -- continue supplemental oxygen and wean down to baseline level  -- needs f/u appt with pulm after discharge  -- appreciate pulm follow uup in hospital    Troponin elevation  -- likely demand ischemia in the setting of COPD exacerbation and pneumonia.  -- seen by cardiology and felt less likely to be ACS  -- TTE shows LVEF of 60-65%, no wall motion abnormalities    Hx of rheumatoid arthritis  -- not on any treatment    # Pain Assessment:  Current Pain Score 7/10/2018   Patient currently in pain? denies   Pain score (0-10) -   Pain descriptors -   CPOT pain score -   Anay nuno pain level was assessed and she currently denies pain.      DVT Prophylaxis: Pneumatic Compression Devices  Code Status: Special Code    Disposition: Expected discharge in the next 1-2 days once able to wean down her oxygen.      Alice Mortensen    Interval History   Increased oxygen requirement in the last 24 hrs, went from 4L NC 11 L oximyzer.  Sounded congested and  felt more short of breath today.   Started using flutter valve this am and able to bring up some sputum. Starting to feel bit better.   She is afebrile.      -Data reviewed today: I reviewed all new labs and imaging results over the last 24 hours. I personally reviewed no images or EKG's today.    Physical Exam   Temp: 98.5  F (36.9  C) Temp src: Oral BP: 139/69 Pulse: 90 Heart Rate: 91 Resp: 24 SpO2: 93 % O2 Device: Oxymizer cannula Oxygen Delivery: 11 LPM  Vitals:    07/08/18 0500 07/09/18 0500 07/10/18 0541   Weight: 48.2 kg (106 lb 3.2 oz) 47.5 kg (104 lb 11.2 oz) 47.2 kg (104 lb 0.9 oz)     Vital Signs with Ranges  Temp:  [98.2  F (36.8  C)-98.8  F (37.1  C)] 98.5  F (36.9  C)  Pulse:  [90-93] 90  Heart Rate:  [] 91  Resp:  [19-24] 24  BP: (139-167)/(69-90) 139/69  SpO2:  [88 %-96 %] 93 %  I/O last 3 completed shifts:  In: 440 [P.O.:440]  Out: 2025 [Urine:2025]    Constitutional: Alert, awake and no apparent distress  Respiratory: Coarse BS bilaterally, no wheezing  Cardiovascular: regular rate and rhythm  GI: soft and non-tender  Skin/Integumen: warm and dry  Other:      Medications       arformoterol  15 mcg Nebulization BID     aspirin  81 mg Oral Daily     budesonide  0.5 mg Nebulization BID     Calcium carb-Vitamin D 500 mg [place]-200 units  1 tablet Oral Daily     cefTRIAXone  1 g Intravenous Q24H     guaiFENesin  1,200 mg Oral BID     ipratropium - albuterol 0.5 mg/2.5 mg/3 mL  3 mL Nebulization Q4H While awake     predniSONE  40 mg Oral Daily     senna-docusate  1 tablet Oral At Bedtime     sodium chloride (PF)  3 mL Intracatheter Q8H       Data     Recent Labs  Lab 07/07/18  0610 07/07/18  0145 07/06/18  2200  07/06/18  1225   WBC 7.9  --   --   --  17.5*   HGB 9.5*  --   --   --  10.5*   *  --   --   --  106*     --   --   --  348     --   --   --  139   POTASSIUM 4.8  --   --   --  4.2   CHLORIDE 98  --   --   --  93*   CO2 39*  --   --   --  39*   BUN 17  --   --   --  21    CR 0.40*  --   --   --  0.49*   ANIONGAP 4  --   --   --  7   ISADORA 8.3*  --   --   --  8.5   *  --   --   --  121*   ALBUMIN  --   --   --   --  2.9*   PROTTOTAL  --   --   --   --  8.9*   BILITOTAL  --   --   --   --  0.3   ALKPHOS  --   --   --   --  75   ALT  --   --   --   --  13   AST  --   --   --   --  16   TROPI 0.284* 0.294* 0.352*  < > 0.333*   < > = values in this interval not displayed.    No results found for this or any previous visit (from the past 24 hour(s)).
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
## 2018-07-10 NOTE — PLAN OF CARE
Problem: Patient Care Overview  Goal: Plan of Care/Patient Progress Review  Outcome: No Change  A&Ox4. VSS on 7L oxymizer; O2 sats dropped, RT called, PRN neb given with improvement. LS coarse; very BASHIR; scheduled nebs. Denies pain. Up with SBA to BSC. Regular diet. Tele NSR. IV SL; IV abx continue. Continue to monitor.

## 2018-07-10 NOTE — PLAN OF CARE
Problem: Patient Care Overview  Goal: Plan of Care/Patient Progress Review  Outcome: No Change  A&O x 4. Intermittently tachycardic. /87. Other VSS on 7L via Oxymizer cannula. Coarse crackles to lung fields. Tele ST with PAC. Denied pain. IV SL. Up to the bed side commode with stand by assist. BASHIR. Slept well throughout. Will continue to monitor.

## 2018-07-10 NOTE — PLAN OF CARE
Problem: Pneumonia (Adult)  Goal: Signs and Symptoms of Listed Potential Problems Will be Absent, Minimized or Managed (Pneumonia)  Signs and symptoms of listed potential problems will be absent, minimized or managed by discharge/transition of care (reference Pneumonia (Adult) CPG).   Outcome: No Change  A&OX4, VSS on 10L Oxymizer cannula except for tachycardia. C/o SOB,BASHIR managed on scheduled nebs. LS coarse with crackles. Pt sounds very congested. Started on mucinex BID. Acapella used with encouragement. Later reported pt was able to cough up some stuff which made breathing easier. Atarax given for self reported anxiety. Up with SBA. IV saline locked. Has had 2 small BMs this shift. D/c in 1-2 days pending improvement in respiratory status.Continue to monitor.

## 2018-07-10 NOTE — PLAN OF CARE
Problem: Patient Care Overview  Goal: Plan of Care/Patient Progress Review  AM PT session attempted; pt now on 11L O2, very fatigued after just returning to bed, offered to assist with supine exercises which pt declined, requesting rest.

## 2018-07-11 NOTE — PLAN OF CARE
Problem: Patient Care Overview  Goal: Plan of Care/Patient Progress Review  Outcome: Improving  A&Ox4. Special code. VSS on 4L NC at rest and 8L with activity except tachycardic at times. Has nonproductive cough, still need sputum sample. LS diminished w/ crackles in bases. Continuous pulse ox on. Tele is ST w/ PACs. Regular diet. Up w/ SBA. Denied pain. Atarax x1 for anxiety. Fall precautions in place. Probable d/c home tomorrow w/ home O2. Fall precautions in place. Will cont to monitor.

## 2018-07-11 NOTE — PROGRESS NOTES
Patient was weaned down to 4L NC throughout the evening with SpO2 in the upper 90's. Pt wears 4L NC at baseline. BS expiratory wheezes. All nebs were given as ordered.  Will cont to follow.  7/10/2018  Jacki Workman RRT

## 2018-07-11 NOTE — PROGRESS NOTES
Patient has been assessed for Home Oxygen needs. Oxygen readings:    *Pulse oximetry (SpO2) = 79% on room air at rest while awake.    *SpO2 improved to 90% on 4 liters/minute at rest.    *SpO2 = 76% on room air during activity/with exercise.    *SpO2 improved to 84% on 4liters/minute during activity/with exercise.    *SpO2 improved to 92% on 8liters/minute during activity/with exercise.

## 2018-07-11 NOTE — PLAN OF CARE
Problem: Patient Care Overview  Goal: Plan of Care/Patient Progress Review  Discharge Planner PT   Patient plan for discharge: home with assist from daughter  Current status: Pt on toilet with RN upon SPTA arrival. Pt on 8L of O2 for activity per RN comment. Gait training 150' x2 CGA with no AD and x1 seated rest break. Transfers training SBA. Pt instructed in seated exs with 02 on RA/4L/8L. O2 sats down <80% on RA and 4L, O2 stays >90% on 8L with activity. Pt left supine in bed with RN and RT present.  Barriers to return to prior living situation: lives alone, increased O2 need  Recommendations for discharge: home with home PT per the plan established by the Physical Therapist   Rationale for recommendations: Pt is mobilizing well but would benefit from home PT to progress and monitor her return to activity so that she safely returns to her PLOF       Entered by: Saurabh Bray 07/11/2018 11:59 AM

## 2018-07-11 NOTE — PROGRESS NOTES
[facility]    Hospitalist Progress Note      Assessment & Plan   Kristine L Soulier is a 73 year old female with a hx of end stage COPD on chornic O2 supplementation, RA, HTN not on meds who was sent to the ED due to hypoxia on 6L oxygen. She is admitted for management of pneumonia and COPD exacerbation.     Acute on chronic hypoxia secondary to suspected multilobar pneumonia and COPD exacerbation:    CT of the chest was negative for PE, but does show worsening pulm infiltrated in the RLL and RML and GEOVANI.    -- continue with ceftriaxone. Completed 5 day of Azithromycin.   -- needs aggressive pulm hygiene. Ordered flutter valve and Mucinex BID  -- wean down oxygen to baseline O2 ~4L  -- will check O2 with ambulation     Acute on chronic COPD exacerbation  -- Solumedrol changed to Prednisone.  Will continue tapering by 10mg q4 days until reaches 10mg daily which will be continued at that dose until her pulm follow up  -- Prednisone decreased to 30mg daily to start tomorrow 7/12  -- continue with Brovana BID and Pulmicort BID  -- continue with Duoneb q4hrs  -- continue supplemental oxygen and wean down to baseline level  -- needs f/u appt with pulm after discharge  -- appreciate pulm follow uup in hospital    Troponin elevation  -- likely demand ischemia in the setting of COPD exacerbation and pneumonia.  -- seen by cardiology and felt less likely to be ACS  -- TTE shows LVEF of 60-65%, no wall motion abnormalities    Hx of rheumatoid arthritis  -- not on any treatment    # Pain Assessment:  Current Pain Score 7/11/2018   Patient currently in pain? denies   Pain score (0-10) -   Pain descriptors -   CPOT pain score -   Anay nuno pain level was assessed and she currently denies pain.      DVT Prophylaxis: Pneumatic Compression Devices  Code Status: Special Code    Disposition: Expected discharge tomorrow. Will need a rx. Also going home with home PT    Alice Mortensen    Interval History    Breathing is better today. O2 requirement improved, but desating to low 80s while on 4L oxygen.   She is afebrile. Denies chest pain, nausea and vomiting.      -Data reviewed today: I reviewed all new labs and imaging results over the last 24 hours. I personally reviewed no images or EKG's today.    Physical Exam   Temp: 98.3  F (36.8  C) Temp src: Oral BP: 144/66 Pulse: 88 Heart Rate: 74 Resp: 22 SpO2: (!) 89 % O2 Device: Nasal cannula Oxygen Delivery: 5 LPM  Vitals:    07/08/18 0500 07/09/18 0500 07/10/18 0541   Weight: 48.2 kg (106 lb 3.2 oz) 47.5 kg (104 lb 11.2 oz) 47.2 kg (104 lb 0.9 oz)     Vital Signs with Ranges  Temp:  [97.9  F (36.6  C)-98.5  F (36.9  C)] 98.3  F (36.8  C)  Pulse:  [88] 88  Heart Rate:  [] 74  Resp:  [22] 22  BP: (124-144)/(63-71) 144/66  SpO2:  [85 %-99 %] 89 %  I/O last 3 completed shifts:  In: -   Out: 125 [Urine:125]    Constitutional: Alert, awake and no apparent distress  Respiratory: Diminished air movement bilaterally, no wheezing  Cardiovascular: regular rate and rhythm  GI: soft and non-tender  Skin/Integumen: warm and dry  Other:      Medications       arformoterol  15 mcg Nebulization BID     aspirin  81 mg Oral Daily     budesonide  0.5 mg Nebulization BID     Calcium carb-Vitamin D 500 mg [place]-200 units  1 tablet Oral Daily     cefTRIAXone  1 g Intravenous Q24H     guaiFENesin  1,200 mg Oral BID     ipratropium - albuterol 0.5 mg/2.5 mg/3 mL  3 mL Nebulization Q4H While awake     [START ON 7/12/2018] predniSONE  30 mg Oral Daily     senna-docusate  1 tablet Oral At Bedtime     sodium chloride (PF)  3 mL Intracatheter Q8H       Data     Recent Labs  Lab 07/07/18  0610 07/07/18  0145 07/06/18  2200  07/06/18  1225   WBC 7.9  --   --   --  17.5*   HGB 9.5*  --   --   --  10.5*   *  --   --   --  106*     --   --   --  348     --   --   --  139   POTASSIUM 4.8  --   --   --  4.2   CHLORIDE 98  --   --   --  93*   CO2 39*  --   --   --  39*   BUN 17   --   --   --  21   CR 0.40*  --   --   --  0.49*   ANIONGAP 4  --   --   --  7   ISADORA 8.3*  --   --   --  8.5   *  --   --   --  121*   ALBUMIN  --   --   --   --  2.9*   PROTTOTAL  --   --   --   --  8.9*   BILITOTAL  --   --   --   --  0.3   ALKPHOS  --   --   --   --  75   ALT  --   --   --   --  13   AST  --   --   --   --  16   TROPI 0.284* 0.294* 0.352*  < > 0.333*   < > = values in this interval not displayed.    No results found for this or any previous visit (from the past 24 hour(s)).

## 2018-07-11 NOTE — PROGRESS NOTES
"  Pulmonary Medicine Progress Note        Date of Admission: 2018  Primary Attending:  Yuki Khan MD  Consulting Physician: Miguelito Godfrey MD      History:      SUBJECTIVE: better today, had episode of inc need for o2 but now back to baseline. A bit anxious about d/c but doing better.    OBJECTIVE:    Heart Rate: 74, Blood pressure 144/66, pulse 88, temperature 98.3  F (36.8  C), temperature source Oral, resp. rate 22, height 1.6 m (5' 3\"), weight 47.2 kg (104 lb 0.9 oz), SpO2 (!) 89 %.  Body mass index is 18.43 kg/(m^2).  Temp (24hrs), Av  F (36.7  C), Min:97.2  F (36.2  C), Max:98.6  F (37  C)      Neck: No lymphadenopathy or thyromegaly, trachea midline, no carotid bruits.  Cardiovascular: Tachycardic,Regular rate and rhythm  Respiratory/Chest: Resonant to percussion, coarse breath sounds bilaterally, prolonged expiratory phase.  Gastrointestinal: Abdomen was soft, non-tender, non-distended, no masses felt, no hepatosplenomegaly.      Medications  Current Facility-Administered Medications Ordered in Epic   Medication Dose Route Frequency Last Rate Last Dose     acetaminophen (TYLENOL) tablet 325-650 mg  325-650 mg Oral Q4H PRN         albuterol neb solution 2.5 mg  3 mL Nebulization Q2H PRN   2.5 mg at 18 2111     arformoterol (BROVANA) neb solution 15 mcg  15 mcg Nebulization BID   15 mcg at 18 0730     aspirin chewable tablet 81 mg  81 mg Oral Daily   81 mg at 18 0845     bisacodyl (DULCOLAX) Suppository 10 mg  10 mg Rectal Daily PRN         budesonide (PULMICORT) neb solution 0.5 mg  0.5 mg Nebulization BID   0.5 mg at 18 0730     Calcium carb-Vitamin D 500 mg Eek-200 units (OSCAL with D;Oyster Shell Calcium) per tablet 1 tablet  1 tablet Oral Daily   1 tablet at 18 0844     cefTRIAXone (ROCEPHIN) 1 g vial to attach to  mL bag for ADULTS or NS 50 mL bag for PEDS  1 g Intravenous Q24H 200 mL/hr at 07/10/18 1623 1 g at 07/10/18 1623     guaiFENesin " (MUCINEX) 12 hr tablet 1,200 mg  1,200 mg Oral BID   1,200 mg at 07/11/18 0844     hydrALAZINE (APRESOLINE) injection 10 mg  10 mg Intravenous Q6H PRN   10 mg at 07/08/18 1815     hydrOXYzine (ATARAX) tablet 10-20 mg  10-20 mg Oral TID PRN   10 mg at 07/10/18 0945     ipratropium - albuterol 0.5 mg/2.5 mg/3 mL (DUONEB) neb solution 3 mL  3 mL Nebulization Q4H While awake   3 mL at 07/11/18 0730     labetalol (NORMODYNE/TRANDATE) injection 10 mg  10 mg Intravenous Q2H PRN         lidocaine (LMX4) cream   Topical Q1H PRN         lidocaine 1 % 1 mL  1 mL Other Q1H PRN         melatonin tablet 1 mg  1 mg Oral At Bedtime PRN         naloxone (NARCAN) injection 0.1-0.4 mg  0.1-0.4 mg Intravenous Q2 Min PRN         nitroGLYcerin (NITROSTAT) sublingual tablet 0.4 mg  0.4 mg Sublingual Q5 Min PRN         ondansetron (ZOFRAN-ODT) ODT tab 4 mg  4 mg Oral Q6H PRN        Or     ondansetron (ZOFRAN) injection 4 mg  4 mg Intravenous Q6H PRN         polyethylene glycol (MIRALAX/GLYCOLAX) Packet 17 g  17 g Oral BID PRN         [START ON 7/12/2018] predniSONE (DELTASONE) tablet 30 mg  30 mg Oral Daily         senna-docusate (SENOKOT-S;PERICOLACE) 8.6-50 MG per tablet 1 tablet  1 tablet Oral At Bedtime         sodium chloride (PF) 0.9% PF flush 3 mL  3 mL Intracatheter Q1H PRN   3 mL at 07/08/18 1502     sodium chloride (PF) 0.9% PF flush 3 mL  3 mL Intracatheter Q8H   3 mL at 07/11/18 0844     No current Epic-ordered outpatient prescriptions on file.           CMP    Recent Labs  Lab 07/07/18  0610 07/06/18  1225    139   POTASSIUM 4.8 4.2   CHLORIDE 98 93*   CO2 39* 39*   ANIONGAP 4 7   * 121*   BUN 17 21   CR 0.40* 0.49*   GFRESTIMATED >90 >90   GFRESTBLACK >90 >90   ISADORA 8.3* 8.5   MAG  --  2.0   PROTTOTAL  --  8.9*   ALBUMIN  --  2.9*   BILITOTAL  --  0.3   ALKPHOS  --  75   AST  --  16   ALT  --  13     CBC    Recent Labs  Lab 07/07/18  0610 07/06/18  1225   WBC 7.9 17.5*   RBC 3.12* 3.41*   HGB 9.5* 10.5*   HCT  33.0* 36.1   * 106*   MCH 30.4 30.8   MCHC 28.8* 29.1*   RDW 13.9 14.0    348     INRNo lab results found in last 7 days.  Arterial Blood Gas    Recent Labs  Lab 07/06/18  1240   PH 7.32*   PCO2 85*   PO2 90   HCO3 44*       Recent Labs  Lab 07/06/18  1253 07/06/18  1225   CULT No growth after 5 days No growth after 5 days             Diagnostic Studies:  Chest Radiology:  1. Worsening of pulmonary infiltrates in the right lower lobe and to a lesser extent in the lateral segment of the right middle lobe and  central aspect of left upper lobe. No change in the rest of infiltrates with peripheral distribution.  2. Resolution of mucous plugging in the bronchi of the basal segments of lower lobes.  3. No evidence of a pulmonary mass.  4. Upper lobe emphysema. The findings are consistent with pneumonia superimposed on chronic obstructive pulmonary disease.              Assessment:      73-year-old female well-known to our service who follows with my partner Edgar Butler with multiple medical problems including severe oxygen dependent COPD, hypertension, arthritis, anxiety. She uses pulmicort and Brovana nebulizers twice a day.  She denied any significant worsening of her baseline dyspnea, and was sent from clinic for hypoxemia.  CT scan of the chest done on admission revealed worsening of pulmonary infiltrates in the right lower lobe and to a lesser extent in the lateral segment of the right middle lobe and central aspect of left upper lobe.  She is now at her baseline oxygenation, but desaturates with movement.        Pulmonary Diagnoses: Abnl CT/CXR R91.8, COPD J44.9, COPD exacerb J44.1, BASHIR R06.09, Hpoxemia R09.02, Pnemonia unspec J18.9 and SOB R06.02     Recommendations       With her severe Lung disease , recovery (if any ) will be slow and she is high risk for readmission. Seems to be near her baseline. Very frail. Would defer initiation of Daliresp to her outpt pulmonologist.  Ok to change storm  to prn   Supplemental oxygen target pulse oxymetry ~88%, wean as able (on 4L baseline @ home)  Brovana and pulmicort neb BID   Continue Abx complete 7-10 days  IS Q4H  Steroid taper: prednisone 40mg/d x 4d and decrease by 10mg every 4d.  Arrange pulmonary follow up with Dr. Butler [phone]  Likely home soon. No further suggestions.  Will sign off. Please call if needed        Kehinde Rueda  [facility]  Office: [phone]  Pager: [phone]

## 2018-07-11 NOTE — PLAN OF CARE
Problem: Patient Care Overview  Goal: Plan of Care/Patient Progress Review  Outcome: Improving  Pt here with management of pneumonia and COPD exacerbation. 4L NC O2 98%. LS crackles. Pt denies SOB or chest pain. A/Ox4. Tele SR with PAC. SBA. Regular diet. Saline locked. DC in 1-2 days, pending weaning of oxygen. Nursing will continue to monitor.

## 2018-07-11 NOTE — PROGRESS NOTES
Met with patient to discuss home oxygen and HHC.  She receives oxygen through [employer].  Called [facility] and confirmed that once they are notified of discharge and have orders/notes they will be able to have new concentrator delivered to patients home to provide the necessary liter flow of oxygen.  Also discussed HHC and patient stated she has used [facility] in the past and would prefer to use this again.

## 2018-07-11 NOTE — PLAN OF CARE
Problem: Patient Care Overview  Goal: Plan of Care/Patient Progress Review  Outcome: Improving  A/O x4. AVSS on 4L NC, baseline; Tachy at times. Tele SR w/ PAC. Up SBA. LS course, IS encouraged. Pt denies pain.Tolerating regular diet. Will continue to monitor.

## 2018-07-12 NOTE — PROGRESS NOTES
Patient is on 5 Lpm bubbler and SpO2 low to high 90`s. Scheduled neb tx given and tolerated well. BBS coarse/crackles/expiratory wheezes. No change post neb tx. Has congested cough. Will continue to follow,

## 2018-07-12 NOTE — DISCHARGE SUMMARY
[facility]    Discharge Summary  Hospitalist    Date of Admission:  7/6/2018  Date of Discharge:  7/12/2018  Discharging Provider: Alice Mortensen  Date of Service (when I saw the patient): 07/12/18    Discharge Diagnoses   Acute on chronic hypoxia secondary to suspected multilobar pneumonia and COPD exacerbation  Troponin elevation    History of Present Illness   Kristine L Soulier is a 73 year old female with a hx of end stage COPD on chornic O2 supplementation, RA, HTN not on meds who was sent to the ED due to hypoxia on 6L oxygen. She is admitted for management of pneumonia and COPD exacerbation. See H & P for detail.      Hospital Course   Kristine L Soulier was admitted on 7/6/2018.  The following problems were addressed during her hospitalization:    Acute on chronic hypoxia secondary to suspected multilobar pneumonia and COPD exacerbation:    CT of the chest was negative for PE, but does show worsening pulm infiltrated in the RLL and RML and GEOVANI.    -- Completed 5 day of Azithromycin. Discharge on Omnicef 300mg BID x 4 more days  -- new O2 rx provided due to higher needs, 4L at rest and upto 8 w/activity     Acute on chronic COPD exacerbation  -- Treated with IV steroid initially and has been transitioned to PO  -- Will discharge on Prednisone tapering by 10mg q4 days until reaches 10mg daily which will be continued at that dose until her pulm follow up  -- continue with Brovana BID and Pulmicort BID  -- continue with Duoneb q4hrs  -- has f/u appt with pulm scheduled     Troponin elevation  -- likely demand ischemia in the setting of COPD exacerbation and pneumonia.  -- seen by cardiology and felt less likely to be ACS  -- TTE shows LVEF of 60-65%, no wall motion abnormalities     Hx of rheumatoid arthritis  -- not on any treatment    # Discharge Pain Plan:   - Patient currently has NO PAIN and is not being prescribed pain medications on discharge.    Alice Mortensen    Significant  Results and Procedures   See labs and imaging below.     Pending Results   Unresulted Labs Ordered in the Past 30 Days of this Admission     No orders found from 5/7/2018 to 7/7/2018.          Code Status   DNR       Primary Care Physician   Carlos Alberto Nelson MD    Physical Exam   Temp: 98.7  F (37.1  C) Temp src: Oral BP: 126/55 Pulse: 84 Heart Rate: 87 Resp: 18 SpO2: 90 % O2 Device: Nasal cannula with humidification Oxygen Delivery: 4 LPM  Vitals:    07/09/18 0500 07/10/18 0541 07/12/18 0630   Weight: 47.5 kg (104 lb 11.2 oz) 47.2 kg (104 lb 0.9 oz) 48.9 kg (107 lb 12.9 oz)     Vital Signs with Ranges  Temp:  [97.5  F (36.4  C)-98.7  F (37.1  C)] 98.7  F (37.1  C)  Pulse:  [84] 84  Heart Rate:  [87-89] 87  Resp:  [18-20] 18  BP: (126-132)/(55-60) 126/55  SpO2:  [90 %-98 %] 90 %  I/O last 3 completed shifts:  In: 360 [P.O.:360]  Out: -     General: alert, awake and no apparent distress  CVS: regular rate and rhythm  Abd: soft and non-tender  Resp: diminished air movement bilaterally, no wheezing  Ext: No LE edema    Discharge Disposition   Discharged to home  Condition at discharge: Stable    Consultations This Hospital Stay   PHARMACY TO DOSE HEPARIN  PULMONARY IP CONSULT  RESPIRATORY CARE IP CONSULT  PHARMACY TO DOSE HEPARIN  CARDIOLOGY IP CONSULT  NUTRITION SERVICES ADULT IP CONSULT  PHYSICAL THERAPY ADULT IP CONSULT  CARE COORDINATOR IP CONSULT    Time Spent on this Encounter   Alice HINDS, personally saw the patient today and spent 30 minutes discharging this patient.    Discharge Orders     Home Care PT Referral for Hospital Discharge     Follow-up and recommended labs and tests    You have an Appointment scheduled with Dr Mayer:  Wednesday  Aug 15. 1:15  [facility]  [phone]    There are no appointments available with Dr Butler or any other provider until Aug 15.You have been placed on a Cancellation list if anything sooner is available     Follow-up and recommended labs and tests     Follow up with primary care provider, Carlos Alberto Nelson MD, within 7 days for hospital follow- up.  No follow up labs or test are needed.  Follow up with pulmonologist at your scheduled appointment.     Activity   Your activity upon discharge: activity as tolerated     MD face to face encounter   Documentation of Face to Face and Certification for Home Health Services    I certify that patient: Kristine L Soulier is under my care and that I, or a nurse practitioner or physician's assistant working with me, had a face-to-face encounter that meets the physician face-to-face encounter requirements with this patient on: 7/12/2018.    This encounter with the patient was in whole, or in part, for the following medical condition, which is the primary reason for home health care:  End stage COPD, physical deconditioning.    I certify that, based on my findings, the following services are medically necessary home health services: Physical Therapy.    My clinical findings support the need for the above services because: Physical Therapy Services are needed to assess and treat the following functional impairments: due to physical deconditioning.    Further, I certify that my clinical findings support that this patient is homebound (i.e. absences from home require considerable and taxing effort and are for medical reasons or [facility] services or infrequently or of short duration when for other reasons) because: Requires assistance of another person or specialized equipment to access medical services because patient:  Patient has severe COPD and activity is limited due to shortness of breath.    Based on the above findings. I certify that this patient is confined to the home and needs intermittent skilled nursing care, physical therapy and/or speech therapy.  The patient is under my care, and I have initiated the establishment of the plan of care.  This patient will be followed by a physician who will periodically  review the plan of care.  Physician/Provider to provide follow up care: Carlos Alberto Nelson    Attending hospital physician (the Medicare certified [facility] provider): Alice Mortensen  Physician Signature: See electronic signature associated with these discharge orders.  Date: 7/12/2018     Reason for your hospital stay   You were admitted to hospital due to COPD exacerbation and possible pneumonia. You will be going home on antibiotics and Prednisone taper.  Once you taper down to Prednisone 10mg daily, continue that until you follow up with your lung doctor.     DNR (Do Not Resuscitate)     Oxygen Adult   [facility] Order 4 liter(s) by nasal cannula at rest and 8L by Nasal Cannula with activity with use of portable tank. Expected treatment length is indefinite (99 months).. Test on conserving device as applicable.    Patients who qualify for home O2 coverage under the CMS guidelines require ABG tests or O2 sat readings obtained closest to, but no earlier than 2 days prior to the discharge, as evidence of the need for home oxygen therapy. Testing must be performed while patient is in the chronic stable state. See notes for O2 sats.    I certify that this patient, Kristine L Soulier has been under my care and that I, or a nurse practitioner or physician's assistant working with me, had a face-to-face encounter that meets the face-to-face encounter requirements with this patient on 7/12/2018. The patient, Kristine L Soulier was evaluated or treated in whole, or in part, for the following medical condition, which necessitates the use of the ordered oxygen. Treatment Diagnosis: End stage COPD    Attending Provider: Alice Mortensen  Physician signature: See electronic signature associated with these discharge orders  Date of Order: July 12, 2018     Diet   Follow this diet upon discharge: Regular       Discharge Medications   Current Discharge Medication List      START taking these medications    Details    cefdinir (OMNICEF) 300 MG capsule Take 1 capsule (300 mg) by mouth 2 times daily for 4 days  Qty: 8 capsule, Refills: 0    Associated Diagnoses: Bilateral pulmonary infiltrates on CXR      !! predniSONE (DELTASONE) 10 MG tablet Take 3 tablets (30 mg) by mouth daily for 3 days  Qty: 9 tablet, Refills: 0    Associated Diagnoses: COPD exacerbation (H)      !! predniSONE (DELTASONE) 10 MG tablet Take 1 tablet (10 mg) by mouth daily  Qty: 30 tablet, Refills: 0    Comments: Future refills by PCP Dr. Carlos Alberto Nelson MD with phone number [phone].  Associated Diagnoses: COPD exacerbation (H)      !! predniSONE (DELTASONE) 20 MG tablet Take 1 tablet (20 mg) by mouth daily for 4 days  Qty: 4 tablet, Refills: 0    Associated Diagnoses: COPD exacerbation (H)       !! - Potential duplicate medications found. Please discuss with provider.      CONTINUE these medications which have NOT CHANGED    Details   albuterol (PROAIR HFA/PROVENTIL HFA/VENTOLIN HFA) 108 (90 Base) MCG/ACT Inhaler Inhale 2 puffs into the lungs every 6 hours as needed for shortness of breath / dyspnea or wheezing  Refills: 0      BROVANA 15 MCG/2ML NEBU Take 15 mcg by nebulization 2 times daily       budesonide (PULMICORT) 0.5 MG/2ML nebulizer solution Take 0.5 mg by nebulization 2 times daily       calcium carbonate-vitamin D 600-200 MG-UNIT TABS Take 1 tablet by mouth daily   Refills: 0      Cholecalciferol (VITAMIN D PO) Take 1 tablet by mouth daily Strength unknown      dextromethorphan-guaiFENesin (MUCINEX DM)  MG per 12 hr tablet Take 1 tablet by mouth every 12 hours       hydrOXYzine (ATARAX) 10 MG tablet Take 1-2 tablets (10-20 mg) by mouth 3 times daily as needed for anxiety  Qty: 60 tablet, Refills: 0    Associated Diagnoses: Anxiety      ipratropium - albuterol 0.5 mg/2.5 mg/3 mL (DUONEB) 0.5-2.5 (3) MG/3ML neb solution Take 1 vial (3 mLs) by nebulization every 6 hours  Qty: 360 mL, Refills: 0    Associated Diagnoses: Pulmonary  emphysema, unspecified emphysema type (H)      ACETAMINOPHEN PO Take 1,000 mg by mouth 2 times daily as needed       !! ORDER FOR DME Equipment being ordered: Oxygen portable, O2 delivered at 3L and may wean O2 to 2.5 L as tolerated  Qty: 1 Device, Refills: 0    Associated Diagnoses: COPD (chronic obstructive pulmonary disease) (H); Pneumonia      !! ORDER FOR DME Equipment being ordered: Nebulizer machine and supplies    [facility]   [phone]  Fax: [phone]  Qty: 1 Device, Refills: 0    Associated Diagnoses: SOB (shortness of breath); COPD exacerbation (H)       !! - Potential duplicate medications found. Please discuss with provider.        Allergies   No Known Allergies  Data   Most Recent 3 CBC's:  Recent Labs   Lab Test  07/07/18   0610  07/06/18   1225  06/18/18   0651   WBC  7.9  17.5*  12.4*   HGB  9.5*  10.5*  9.8*   MCV  106*  106*  102*   PLT  272  348  345      Most Recent 3 BMP's:  Recent Labs   Lab Test  07/07/18   0610  07/06/18   1225  06/18/18   0651   NA  141  139  138   POTASSIUM  4.8  4.2  4.2   CHLORIDE  98  93*  98   CO2  39*  39*  37*   BUN  17  21  9   CR  0.40*  0.49*  0.36*   ANIONGAP  4  7  3   ISADORA  8.3*  8.5  8.0*   GLC  128*  121*  89     Most Recent 2 LFT's:  Recent Labs   Lab Test  07/06/18   1225  05/16/18   0720   AST  16  18   ALT  13  19   ALKPHOS  75  91   BILITOTAL  0.3  0.2     Most Recent INR's and Anticoagulation Dosing History:  Anticoagulation Dose History     There is no flowsheet data to display.        Most Recent 3 Troponin's:  Recent Labs   Lab Test  07/07/18   0610  07/07/18   0145  07/06/18   2200   TROPI  0.284*  0.294*  0.352*     Most Recent Cholesterol Panel:  Recent Labs   Lab Test  06/18/18   0651   CHOL  175   LDL  107*   HDL  49*   TRIG  94     Most Recent 6 Bacteria Isolates From Any Culture (See EPIC Reports for Culture Details):  Recent Labs   Lab Test  07/06/18   1253  07/06/18   1225  05/15/18   0907  05/14/18   1310  05/14/18   1227   01/07/15   1532   CULT  No growth  No growth  Light growth  Normal charis    No growth  No growth  No growth     Most Recent TSH, T4 and A1c Labs:  Recent Labs   Lab Test  07/08/15   0903   TSH  1.15     Results for orders placed or performed during the hospital encounter of 07/06/18   Chest CT, IV contrast only - PE protocol    Narrative    CT CHEST PULMONARY EMBOLISM W CONTRAST   7/6/2018 2:25 PM     HISTORY: Worsening hypoxia and bilateral infiltrates on chest x-ray.     TECHNIQUE:  49 mL Isovue-[id number]. Radiation dose for this scan was reduced  using automated exposure control, adjustment of the mA and/or kV  according to patient size, or iterative reconstruction technique.    COMPARISON: Radiographs today and 6/17/2018. CT scan 5/16/2018.    FINDINGS:  There is no evidence of pulmonary embolism or aortic  dissection. Extensive calcifications are seen in the coronary arteries  and the aortic annulus. Cardiac chambers are normal in size.    There is mild bilateral hilar adenopathy and subcarinal adenopathy as  well as pretracheal adenopathy. Lung windows show interstitial  infiltrates throughout both lungs with extensive emphysema in the  upper lobes. Patchy peripheral infiltrates are scattered through the  lungs with prominent involvement of the basal segments of lower lobes,  the right middle lobe and to a lesser extent the right upper lobe.  Compared with the previous exam of 5/16/2018 there are new infiltrates  in the central aspect of left upper lobe and in the lateral segment of  the right middle lobe, and the infiltrates throughout the right lower  lobe especially anterolaterally are new. Mucous plugging in the basal  segments of lower lobes was present on the previous exam and has  resolved. There is no evidence of a pulmonary mass. No pleural  effusion. Visualized portions of the abdominal organs appear normal.      Impression    IMPRESSION:  1. Worsening of pulmonary infiltrates in the right lower lobe and  to a  lesser extent in the lateral segment of the right middle lobe and  central aspect of left upper lobe. No change in the rest of  infiltrates with peripheral distribution.  2. Resolution of mucous plugging in the bronchi of the basal segments  of lower lobes.  3. No evidence of a pulmonary mass.  4. Upper lobe emphysema. The findings are consistent with pneumonia  superimposed on chronic obstructive pulmonary disease.    MICH FERNANDEZ MD

## 2018-07-12 NOTE — PLAN OF CARE
Problem: Patient Care Overview  Goal: Plan of Care/Patient Progress Review  Outcome: No Change  Pt is A&Ox4, up with SBA, VSS On 4L O2 at rest and 8L with activity. Occasionally tachy.  LS coarse crackles, some exp wheezes. Scheduled nebs continued. IV abx continued. D/c possibly tomorrow oh home O2.

## 2018-07-12 NOTE — PLAN OF CARE
Problem: Patient Care Overview  Goal: Plan of Care/Patient Progress Review  Outcome: Improving  Pt here with management of pneumonia and COPD exacerbation. 4L NC O2 100%; up to bathroom uped O2 to 5L NC, pt tolerated well. LS crackles. Tele: NSR. Saline locked. SBA. Regular diet. Denies an SOB or chest pain. PR home 7.12.18.

## 2018-07-12 NOTE — PROGRESS NOTES
Discharge    Patient discharged to home via a taxi with all belongings, daughter at side on 7/12/18 at 1428.   Care plan note: A&Ox4. DNI/ special code. VSS on 4L NC at rest, 8L with activity. BASHIR reported. Denied pain. Has dry cough. Tele was NSR. Regular diet. IV saline locked, now removed. D/C complete. D/C w/ all home belongings.     Listed belongings gathered and returned to patient. YES  Care Plan and Patient education resolved: YES  Prescriptions if needed, hard copies sent with patient: YES  Home and hospital acquired medications returned to patient: YES  Medication Bin checked and emptied on discharge: YES  Follow up appointment made for patient: YES

## 2018-07-12 NOTE — PLAN OF CARE
Problem: Patient Care Overview  Goal: Plan of Care/Patient Progress Review  PT: Pt discharged home with HHPT prior to being seen by PT 7/12.    PT goals not met.

## 2018-07-12 NOTE — PLAN OF CARE
Problem: Patient Care Overview  Goal: Plan of Care/Patient Progress Review  Outcome: Adequate for Discharge Date Met: 07/12/18  A&Ox4. DNI/ special code. VSS on 4L NC at rest, 8L with activity. BASHIR reported. Denied pain. Has dry cough. Tele was NSR. Regular diet. IV saline locked, now removed. D/C complete. D/C w/ all home belongings.

## 2018-07-12 NOTE — PROGRESS NOTES
Spoke with [facility] [phone]. They will be able to provide the required oxygen for patient,orders faxed. Patient discharging today with home care services,patient's daughter will be living with patient for a while and assist as needed. Transportation provided by [employer]

## 2018-07-13 NOTE — TELEPHONE ENCOUNTER
Homecare calling to get the following orders:  Skilled nursing, 1 time a week for 1 week, then 3 times a week for 2 weeks, then 2 times a week for 1 week, with 5 prn visits.  Also:  PT, OT, -- eval and treat.  Please call today.  OK to leave message on voicemail.

## 2018-07-13 NOTE — LETTER
Health Care Home - Access Care Plan    About Me  Patient Name:  Kristine L Soulier    YOB: 1945  Age:                             73 year old   Brenda MRN:            [id number] Telephone Information:     Home Phone [phone]   Mobile none       Address:    [address]  [facility] [address] Email address:  No e-mail address on record      Emergency Contact(s)  Name Relationship Lgl Grd Work Phone Home Phone Mobile Phone   1. SOULIER,PAULA Daughter  [phone]   2. SOULIER, DENISE Daughter  none [phone]             Health Maintenance:      My Access Plan  Medical Emergency 911   Questions or concerns during clinic hours Primary Clinic Line, I will call the clinic directly: [facility] [place][note 2] [phone]   24 Hour Appointment Line [phone] or  [phone]) (toll free)   24 Hour Nurse Line [phone] (toll free)   Questions or concerns outside clinic hours 24 Hour Appointment Line, I will call the after-hours on-call line:   [facility] [phone] or [phone]) (toll-free)   Preferred Urgent Care     Preferred Hospital     Preferred Pharmacy [facility] [address]     Behavioral Health Crisis Line The National Suicide Prevention Lifeline at 1-116.430.5182 or 911     My Care Team Members  Patient Care Team       Relationship Specialty Notifications Start End    Carlos Alberto Nelson MD PCP - General Family Practice  6/17/18     Phone: [phone] Fax: [phone]         [address] [facility] [address]    Edgar Butler MD MD Pulmonary  4/28/14     Phone: [phone] Pager: [phone] Fax: [phone]        [facility] [address] [address]    [facility]   4/28/14     Comment:  DME  Oxygen company   1.5 LPM     Phone:  [phone]         [facility]  Pharmacist   4/28/14     Comment:  Nebulizer and Inhalers  treatments     Phone: [phone]         [address], [place] [address] [phone]    [facility]     4/28/14     Comment:  RN PT/OT  2x weekly  Nurse Anita     Fax: [phone]          [address] [place] [address]    Edgar Butler MD MD Pulmonary  6/18/14     Comment:  She is having an upcoming appointment on June 26th.    Phone: [phone] Pager: [phone] Fax: [phone]        [facility] [address] [address]    [facility]   [facility] ([facility] ([place])  7/13/18     Fax: [phone]          [address] [place] [address]    Alaina Tejeda RN Clinic Care Coordinator Primary Care - CC  7/13/18     Phone: [phone] Fax: [phone]               My Medical and Care Information  Problem List   Patient Active Problem List   Diagnosis     CARDIOVASCULAR SCREENING; LDL GOAL LESS THAN 130     Seasonal allergic rhinitis     Advanced directives, counseling/discussion     Enlarged lymph nodes     End stage COPD (H)     Community acquired pneumonia     Health Care Home     Anxiety     HTN, goal below 140/80     Hilar adenopathy     Abdominal pain, generalized     RA (rheumatoid arthritis) (H)     Oxygen dependent     Positive sputum culture for Aspergillus (H) as per history      Mycobacterium avium infection (H) history      NSTEMI (non-ST elevated myocardial infarction) (H)     Chest pain     Hypoxia      Current Medications and Allergies:  See printed Medication Report

## 2018-07-13 NOTE — LETTER
[facility]    7/13/2018       Kristine L Soulier  [address]   [place] [facility] [address]        Dear Anay,    It was a pleasure to speak with you.  I would like to provide you with the enclosed emergency contact  information for your records.      As always, feel free to contact me if you have any questions or concerns.  I look forward to working with you in the effort to achieve your health care and wellness goals .        Sincerely,        Alaina Tejeda RN / Clinical Care Coordinator     [facility]   [email or web address]  Office :  [phone] / Fax :  [phone]

## 2018-07-13 NOTE — PROGRESS NOTES
Clinic Care Coordination Contact  Care Coordination Communication         Clinical Data: Patient was hospitalized at [facility]  from 7/6/2018 to 7/12/2018 with diagnosis of   ED documentation copied below :  Acute on chronic hypoxia secondary to suspected multilobar pneumonia and COPD exacerbation  Troponin elevation        Home Care Contact:              Home Care Agency: [facility]               Contact name () and phone number: Luisana Bravo [phone]              Care Coordination contacted home care: Yes              Anticipated start of care date: Today  Patient Contact:               Introduced self and role of care coordination.               Discharge instructions were reviewed with patient/caregiver.               Do you have any questions about your medications? No              Follow up appointment is scheduled for 7/16/2018.              Provided 24 Hour Nurse Line and/or 24 Hour Appointment Scheduling: Yes              Home care has contacted patient: Yes              Patient questions/concerns:  Clinic Care Coordination Pneumonia Assessment:  Discharge:    Day of hospital discharge:  7/12/2018    What recommendations were made for follow up after your recent hospitalization? New start on Oxygen  antibiotic and Prednisone     Have the follow up appointments been scheduled? Yes    Transportation concerns? No    Is there a referral/need for Pulmonary Rehab? No    Is the patient enrolled in [employer] Tele-Assurance program? No    Symptom Review:    How have you been feeling now that you are home?  Tired    Are you having any increased shortness of breath? No    Are you having any fevers? No    When you cough, are you coughing up anything? Yes non productive   Medications:     Were you started on any new medications?  Yes,     Were any of your previous medications changed? No    Do you have all of your medications? Yes,     Are your medications effective in controlling your  symptoms? Yes,     Are you currently on Prednisone (* does pt understand the tapering instructions)?  Yes    Medication reconciliation completed? Yes    Was MTM or Diabetic Education referral placed (*Make sure to put transitions as reason for referral)? No    Oxygen/DME    Are you currently on oxygen? Yes     Is this new for you? Yes     Is it set up at home? Yes     What liter flow were you instructed to use and how often do you use it? 4 liters at rest and 8 liters with activity     Review with patient how to use/maintain nebulizers and inhalers: Using Nebulizers as directed   Activity:    How much activity can you do before you are SOB? Very little pacing activity     Were you instructed on how to cough and deep breathe? Yes, today  Plan: RN/SW Care Coordinator will await notification from home care staff informing RN/SW Care Coordinator of patients discharge plans/needs. RN/SW Care Coordinator will review chart and outreach to home care every 4 weeks and as needed.      Alaina Tejeda RN / Clinical Care Coordinator     [facility]   [email or web address]  Office :  [phone] / Fax :  [phone]

## 2018-07-13 NOTE — TELEPHONE ENCOUNTER
See care coordination encounter .  This encounter closed     Alaina Tejeda RN / Clinical Care Coordinator     [facility]   [email or web address]  Office :  [phone] / Fax :  [phone]

## 2018-07-16 NOTE — TELEPHONE ENCOUNTER
Reason for Call: Request for an order or referral:    Order or referral being requested: PT- Twice a week for 2 weeks for exercise and gait.    Date needed: as soon as possible    Has the patient been seen by the PCP for this problem? YES    Additional comments:     Phone number Patient can be reached at:  Other phone number:  [phone]    Best Time:      Can we leave a detailed message on this number?  YES    Call taken on 7/16/2018 at 4:37 PM by Ginger Nova

## 2018-07-17 NOTE — MR AVS SNAPSHOT
"              After Visit Summary   7/17/2018    Kristine L Soulier    MRN: [id number]           Patient Information     Date Of Birth          1945        Visit Information        Provider Department      7/17/2018 10:40 AM Carlos Alberto Nelson MD [facility]        Today's Diagnoses     Community acquired pneumonia, unspecified laterality    -  1    End stage COPD (H)        Anxiety        HTN, goal below 140/80           Follow-ups after your visit        Who to contact     If you have questions or need follow up information about today's clinic visit or your schedule please contact [facility] directly at [phone].  Normal or non-critical lab and imaging results will be communicated to you by MyChart, letter or phone within 4 business days after the clinic has received the results. If you do not hear from us within 7 days, please contact the clinic through MyChart or phone. If you have a critical or abnormal lab result, we will notify you by phone as soon as possible.  Submit refill requests through [employer] or call your pharmacy and they will forward the refill request to us. Please allow 3 business days for your refill to be completed.          Additional Information About Your Visit        Care EveryWhere ID     This is your Care EveryWhere ID. This could be used by other organizations to access your [facility] medical records  [id number]        Your Vitals Were     Pulse Temperature Respirations Height Pulse Oximetry BMI (Body Mass Index)    86 99  F (37.2  C) (Oral) 16 5' 3\" (1.6 m) 99% 17.85 kg/m2       Blood Pressure from Last 3 Encounters:   07/17/18 146/65   07/12/18 126/55   07/06/18 130/57    Weight from Last 3 Encounters:   07/17/18 100 lb 12 oz (45.7 kg)   07/12/18 107 lb 12.9 oz (48.9 kg)   06/18/18 99 lb 11.2 oz (45.2 kg)              Today, you had the following     No orders found for display         Today's Medication Changes          These changes are " accurate as of 7/17/18 12:03 PM.  If you have any questions, ask your nurse or doctor.               These medicines have changed or have updated prescriptions.        Dose/Directions    ipratropium - albuterol 0.5 mg/2.5 mg/3 mL 0.5-2.5 (3) MG/3ML neb solution   Commonly known as:  DUONEB   This may have changed:  when to take this   Used for:  Pulmonary emphysema, unspecified emphysema type (H)        Dose:  3 mL   Take 1 vial (3 mLs) by nebulization every 6 hours   Quantity:  360 mL   Refills:  0                Primary Care Provider Office Phone # Fax #    Carlos Alberto Donte Nelson [phone] [address] [place] [address]        Goals        General    Medication 1 (pt-stated)     Notes - Note created  9/11/2014 11:39 AM by Clarice Stark, RN    I will get Flu shot in October 2014 .    As of today's date 9/11/2014 goal is met at 0 - 25%.   Goal Status:  Active        Equal Access to Services     [facility]: Hadii simi lou hadasho Socitlali, waaxda luqadaha, qaybta kaalmada adeegyanasrin, yamel paul . So [facility] [phone].    ATENCIÓN: Si habla español, tiene a grover disposición servicios gratuitos de asistencia lingüística. Llame al [phone].    We comply with applicable federal civil rights laws and [place] laws. We do not discriminate on the basis of race, color, national origin, age, disability, sex, sexual orientation, or gender identity.            Thank you!     Thank you for choosing [facility]  for your care. Our goal is always to provide you with excellent care. Hearing back from our patients is one way we can continue to improve our services. Please take a few minutes to complete the written survey that you may receive in the mail after your visit with us. Thank you!             Your Updated Medication List - Protect others around you: Learn how to safely use, store and throw away your medicines at www.disposemymeds.org.           This list is accurate as of 7/17/18 12:03 PM.  Always use your most recent med list.                   Brand Name Dispense Instructions for use Diagnosis    ACETAMINOPHEN PO      Take 1,000 mg by mouth 2 times daily as needed        albuterol 108 (90 Base) MCG/ACT Inhaler    PROAIR HFA/PROVENTIL HFA/VENTOLIN HFA     Inhale 2 puffs into the lungs every 6 hours as needed for shortness of breath / dyspnea or wheezing        BROVANA 15 MCG/2ML Nebu neb solution   Generic drug:  arformoterol      Take 15 mcg by nebulization 2 times daily        budesonide 0.5 MG/2ML neb solution    PULMICORT     Take 0.5 mg by nebulization 2 times daily        calcium carbonate-vitamin D 600-200 MG-UNIT Tabs      Take 1 tablet by mouth daily        dextromethorphan-guaiFENesin  MG per 12 hr tablet    MUCINEX DM     Take 1 tablet by mouth every 12 hours        hydrOXYzine 10 MG tablet    ATARAX    60 tablet    Take 1-2 tablets (10-20 mg) by mouth 3 times daily as needed for anxiety    Anxiety       ipratropium - albuterol 0.5 mg/2.5 mg/3 mL 0.5-2.5 (3) MG/3ML neb solution    DUONEB    360 mL    Take 1 vial (3 mLs) by nebulization every 6 hours    Pulmonary emphysema, unspecified emphysema type (H)       * order for DME     1 Device    Equipment being ordered: Nebulizer machine and supplies  [facility]  [phone] Fax: [phone]    SOB (shortness of breath), COPD exacerbation (H)       * order for DME     1 Device    Equipment being ordered: Oxygen portable, O2 delivered at 3L and may wean O2 to 2.5 L as tolerated    COPD (chronic obstructive pulmonary disease) (H), Pneumonia       * predniSONE 20 MG tablet    DELTASONE    4 tablet    Take 1 tablet (20 mg) by mouth daily for 4 days    COPD exacerbation (H)       * predniSONE 10 MG tablet   Start taking on:  7/20/2018    DELTASONE    30 tablet    Take 1 tablet (10 mg) by mouth daily    COPD exacerbation (H)       VITAMIN D PO      Take 1 tablet by mouth daily Strength  unknown        * Notice:  This list has 4 medication(s) that are the same as other medications prescribed for you. Read the directions carefully, and ask your doctor or other care provider to review them with you.

## 2018-07-17 NOTE — PROGRESS NOTES
SUBJECTIVE:   Kristine L Soulier is a 73 year old female with a history of end-stage COPD who presents to clinic today for follow-up after six days in the hospital for multilobar pneumonia.     Patient was seen on 7/6 for hospital follow-up for chest pain.Patient described feeling more short of breath of exertion.She presented with SPO2 of 73 on 4L of oxygen. Her oxygen was titrated to 6L and she was able to maintain her oxygen saturation at 80-90%. During her hospitalization, she was found to have a possible infiltrate in her right lower lung field. A chest X-ray was repeated in the clinic, and it showed worsening infiltrates on the right side with new infiltrates on the left side. Given her prior history of aspergillosis and MELISA as well as her recent hospitalization we were concerned about worsening of her prior pneumonia or newly acquired hospital pneumonia. We recommended that present to the ER because of her difficulties maintaining oxygenation and her worsening infiltrates bilaterally in the lungs.      Today, the patient states that she is feeling much better, but she has noticed an increase in her anxiety since leaving the hospital. She believes that her anxiety may be a result of prior problems with alarms going off on her oxygen tanks at home. She describes mild cough and occasional mucous production, but she states no significant changes with either since the hospital. She is currently using 4L of oxygen at rest, and increases her oxygen to 8L with increased activity. She denies shortness of breath and any breathing difficulties with current oxygen levels.  She has completed her antibiotics and is currently taking prednisone.         Hospital Follow-up Visit:    Hospital/Nursing Home/IP Rehab Facility: [facility]  Date of Admission: 07/06/2018  Date of Discharge: 07/12/2018  Reason(s) for Admission: Acute on chronic hypoxia secondary to suspected multilobar pneumonia and COPD  exacerbation and Troponin elevation            Problems taking medications regularly:  None       Medication changes since discharge: prednisone 20mg instead of 10mg        Problems adhering to non-medication therapy:  None    Summary of hospitalization:  [facility] discharge summary reviewed  Diagnostic Tests/Treatments reviewed.  Follow up needed: none  Other Healthcare Providers Involved in Patient s Care:         Specialist appointment - Pulmonologist  and Physical Therapy  Update since discharge: improved.     Post Discharge Medication Reconciliation: discharge medications reconciled, continue medications without change.  Plan of care communicated with patient and family (son)     Coding guidelines for this visit:  Type of Medical   Decision Making Face-to-Face Visit       within 7 Days of discharge Face-to-Face Visit        within 14 days of discharge   Moderate Complexity 49866 [id number]   High Complexity 08383 53334                 Problem list and histories reviewed & adjusted, as indicated.  Additional history: as documented    Labs reviewed in EPIC    Reviewed and updated as needed this visit by clinical staff       Reviewed and updated as needed this visit by Provider                Social History     Social History     Marital status:      Spouse name: N/A     Number of children: 5     Years of education: 12     Occupational History     retired       Self     Social History Main Topics     Smoking status: Former Smoker     Packs/day: 0.25     Years: 20.00     Types: Cigarettes     Quit date: 1/29/2014     Smokeless tobacco: Former User     Alcohol use No     Drug use: No     Sexual activity: Yes     Partners: Male     Other Topics Concern     Parent/Sibling W/ Cabg, Mi Or Angioplasty Before 65f 55m? No     Social History Narrative    Balanced Diet - tries to    Osteoporosis Prevention Measures - Dairy servings per day: 3-4    Regular Exercise -  No Describe no    Dental Exam - YES - Date:  "10/09    Eye Exam - YES - Date: about a year ago    Self Breast Exam - she tries to    Abuse: Current or Past (Physical, Sexual or Emotional)- No    Do you feel safe in your environment - Yes    Guns stored in the home - No    Sunscreen used - No    Seatbelts used - Yes    Lipids -  YES - Date: years ago, was on lipitor for awhile    Glucose -  NO    Colon Cancer Screening - No, has not had a colonoscopy done before and she does not want to have one done    Hemoccults - NO    Pap Test -  YES - Date: years ago    Do you have any concerns about STD's -  No    Mammography - YES - Date: years    DEXA - NO    Immunizations reviewed and up to date - last td given years ago    Rowena Parmar ma  2009                 No Known Allergies  Patient Active Problem List   Diagnosis     CARDIOVASCULAR SCREENING; LDL GOAL LESS THAN 130     Seasonal allergic rhinitis     Advanced directives, counseling/discussion     Enlarged lymph nodes     End stage COPD (H)     Community acquired pneumonia     Health Care Home     Anxiety     HTN, goal below 140/80     Hilar adenopathy     Abdominal pain, generalized     RA (rheumatoid arthritis) (H)     Oxygen dependent     Positive sputum culture for Aspergillus (H) as per history      Mycobacterium avium infection (H) history      NSTEMI (non-ST elevated myocardial infarction) (H)     Chest pain     Hypoxia     Reviewed medications, social history and  past medical and surgical history.    Review of system: for general, respiratory, CVS, psychiatry negative except for noted above.     EXAM:  /65 (BP Location: Right arm, Patient Position: Sitting, Cuff Size: Adult Regular)  Pulse 86  Temp 99  F (37.2  C) (Oral)  Resp 16  Ht 5' 3\" (1.6 m)  Wt 100 lb 12 oz (45.7 kg)  SpO2 99%  BMI 17.85 kg/m2  Constitutional: healthy, alert and no distress  Psychiatric: mentation appears normal and affect normal/bright  Respiratory: Diffuse expiratory wheezing in both lungs, no crackles, no tachypnea. " Using supplemental oxygen via nasal cannula.   Heart - mild tachycardia, RRR, no murmur    ASSESSMENT / PLAN:    (J18.9) Community acquired pneumonia, unspecified laterality  Comment: we reviewed her discharge summary. Patient was discharged on Omnicef 300mg BID x4 days. She completed her medications. Was given antibiotics in the hospital. She was also seen by pulmonologist.   Plan: No plan for follow-up at this time.     (J44.9) End stage COPD (H)  (primary encounter diagnosis)  Comment: Patient has end-stage COPD. She is comfortable at current oxygen levels. Was discharged on 20mg prednisone taper and going to continue 10mg for a month or so until seen by pulmonologist for follow up.   Plan: Patient will continue taking 10mg of prednisone until her pulmonology appt on Aug 15.     (F41.9) Anxiety  Comment: has baseline anxiety. Was using benzos which is not ideal due to her age and respi status. Currently prednisone and recurrent hospitalization may be making her anxiety worse. Not given enough trial of hydroxyzine. She will use it consistently. Discussed drowsiness.   Plan: Increase her hydroxyzine to 1-2 pills 3x a day.      (I10) HTN, goal below 140/80  Comment: Most likely steroid treatment contributing to her increase in BP.   Plan: D we will continue to monitor her BP. No medication changes for her BP at this time.     Follow up: with pulmonologist - already scheduled.      Dicussed plan with patient and son.

## 2018-07-17 NOTE — PROGRESS NOTES
Dear Dr. Carlos Alberto Nelson  [facility] process is that all ordered disciplines will be involved in the development of the plan of care.  The following disciplines were unable to see Kristine L Soulier; MRN [id number] within the 5 day evaluation window.  There will be a delay in the evalation visit by    OT      We will notify you when the evaluation is completed.  The patient has been notified.      Sincerely [facility]  Kiley Orta  [phone]

## 2018-07-25 PROBLEM — J44.1 COPD EXACERBATION (H): Status: ACTIVE | Noted: 2018-01-01

## 2018-07-25 NOTE — ED PROVIDER NOTES
History     Chief Complaint:  Shortness of breath    HPI   Kristine L Soulier is a 73 year old female with a history of end stage COPD, hypertension, and hyperlipidemia who presents to the ED from [facility] for evaluation of shortness of breath. The patient is oxygen dependent and reports being on 4L O2 at rest and 8L with exertion at home. This morning, patient began feeling very short of breath and is now requiring 10L O2 at rest. She states this feels like her previous COPD exacerbations. Patient was recently discharged from the hospital 13 days ago for pneumonia and a COPD exacerbation. Patient denies any cough, chest pain, leg swelling, headache, vision changes, fever, abdominal pain, nausea, vomiting, diarrhea, or any other symptoms. She has no history of PE. She is not anticoagulated.    CARDIAC RISK FACTORS:  Sex:    Female  Tobacco:   Former  Hypertension:   Yes  Hyperlipidemia:  Yes  Diabetes:   No  Family History:  No    PE/DVT RISK FACTORS:  Sex:    Female  Hormones:   No  Tobacco:   Former  Cancer:   No  Travel:   No  Surgery:   No  Other immobilization: No  Personal history:  No  Family history:  No    Allergies:  No known drug allergies    Medications:    albuterol (PROAIR HFA/PROVENTIL HFA/VENTOLIN HFA) 108 (90 Base) MCG/ACT Inhaler  BROVANA 15 MCG/2ML NEBU  budesonide (PULMICORT) 0.5 MG/2ML nebulizer solution  calcium carbonate-vitamin D 600-200 MG-UNIT TABS  Cholecalciferol (VITAMIN D PO)  hydrOXYzine (ATARAX) 10 MG tablet  ipratropium - albuterol 0.5 mg/2.5 mg/3 mL (DUONEB) 0.5-2.5 (3) MG/3ML neb solution  predniSONE (DELTASONE) 10 MG tablet     Past Medical History:    Allergic rhinitis  Former smoker  Hypertension  Hyperlipidemia  Community acquired pneumonia.  End stage COPD  Hypoxia  Positive sputum culture for Aspergillus  Mycobacterium avium infection  NSTEMI  Oxygen dependent  RA  Anxiety  Hilar adenopathy    Past Surgical History:    Bronchoscopy flexible  Bronchoscopy flexible  "and rigid x2    Family History:    Asthma  Diabetes  Arthritis  Circulatory  Genitourinary problems  Respiratory - emphysema  Alcohol    Social History:  Smoking status: Former  Alcohol use: No  Marital Status:       Review of Systems   Constitutional: Negative for fever.   Eyes: Negative for visual disturbance.   Respiratory: Positive for shortness of breath. Negative for cough.    Cardiovascular: Negative for chest pain and leg swelling.   Gastrointestinal: Negative for abdominal pain, diarrhea, nausea and vomiting.   Neurological: Negative for headaches.   All other systems reviewed and are negative.    Physical Exam   Patient Vitals for the past 24 hrs:   BP Temp Temp src Pulse Resp SpO2 Height Weight   07/25/18 1620 - 98.6  F (37  C) Rectal - - - - -   07/25/18 1540 161/58 98.2  F (36.8  C) Oral 107 18 96 % 1.6 m (5' 3\") 45.4 kg (100 lb)     Physical Exam  General: Alert, appears frail, elderly and cachectic. Cooperative.     In moderate respiratory distress  HEENT:  Head:  Atraumatic  Ears:  External ears are normal  Mouth/Throat:  Oropharynx is without erythema or exudate and mucous membranes are moist.   Eyes:   Conjunctivae normal and EOM are normal. No scleral icterus.  CV:  Tachycardic rate, regular rhythm, normal heart sounds and radial pulses are 2+ and symmetric.    Resp:  Breath sounds are distant bilaterally with bilateral basilar crackles.    Labored, NC at 10LPM.  Mild retractions.  GI:  Abdomen is soft, no distension, no tenderness. No rebound or guarding.  No CVA tenderness bilaterally  MS:  Normal range of motion. No edema.    Normal strength in all 4 extremities.     Back atraumatic.    No midline cervical, thoracic, or lumbar tenderness  Skin:  Warm and dry.  No rash or lesions noted.  Neuro: Alert. Normal strength.  GCS: 15  Psych:  Normal mood and affect.    Emergency Department Course   ECG (16:11:06):  Rate 97 bpm. CA interval 186. QRS duration 88. QT/QTc 332/421. P-R-T axes 47 42 " 32. Normal sinus rhythm. Minimal voltage criteria for LVH, may be normal variant. Cannot rule out anteroseptal infarct, age undetermined. Abnormal ECG. Interpreted at 1625 by Josef Prakash MD.    Imaging:  Radiographic findings were communicated with the patient who voiced understanding of the findings.    X-ray Chest, 2 views:  IMPRESSION: Two views of the chest are performed. Emphysema is again  noted with hyperexpansion of the lungs and flattening of the  hemidiaphragms. Right lung base infiltrate is slightly improved but  has not resolved. Chronic interstitial changes are noted within both  lungs which appears stable. No pneumothorax. Possible trace right  pleural effusion is unchanged.  Result per radiology.     Laboratory:  UA: Trace blood, GLC >1000, Protein albumin 30, Mucous present, o/w Negative  Urine culture: Pending  Blood gas venous: pH7.24 (L), PCO2 122 (HH), PO2 33, Bicarbonate 53 (HH), Base excess 20.5  CBC: WBC 12.1 (H), HGB 10.4 (L) o/w WNL ()  CMP: Glucose 246 (H), Chloride 93 (L), Creatinine 0.38 (L), Albumin 2.8 (L), Carbon dioxide >45 (HH) o/w WNL  Lactic Acid: 1.9  1605 - Troponin: 0.339 (HH)  Blood Culture x2: Pending    Interventions:  1621: Duoneb 3 mL Nebulization  1618: Solu-Medrol 125mg IV injection  1702: Duoneb 3 mL Nebulization    Emergency Department Course:  Past medical records, nursing notes, and vitals reviewed.  1551: I performed an exam of the patient and obtained history, as documented above. GCS 15.    IV inserted and blood drawn.    The patient was sent for a x-ray while in the emergency department, findings above.    1618: I rechecked the patient.    1634: I spoke with laboratory about the patient's blood gas venous results.    Findings and plan explained to the Patient who consents to admission.     1728: Discussed the patient with Dr. Diop, who will admit the patient to an [facility] bed for further monitoring, evaluation, and treatment.     Impression & Plan       Medical Decision Making:  Patient is a 73-year-old female with complex past medical history pertinent for end-stage COPD on chronic O2 supplementation, RA, hypertension, and recent admission for pneumonia and COPD exacerbation who presents with increasing shortness of breath today.  Patiently is normally on 4 L of O2 by nasal cannula at home at rest.  Patient is requiring greater than 10 L O2 here at rest.  Patient with poor air movement bilaterally, concerning for COPD exacerbation.  Patient was given 3 duo nebs here in the emergency department with mild improvement in the patient's shortness of breath symptoms.  Her oxygenation saturation are between 88-91% on 10 L by Oxymizer.  Patient refuses BiPAP as she has significant claustrophobia.  We discussed that without BiPAP she may continue to feel significantly short of breath and she understood this.  Patient has a chest x-ray showing emphysema in the right lung base infiltrate that is slightly improved but not fully resolved.  Patient was recently on a course of antibiotics during her prior admission for suspected community-acquired pneumonia.  She apparently completed a 5 day course of azithromycin and was discharged on Omnicef 300 mg twice a day.  Patient with no fever, increasing cough, or sputum production here in the emergency department.  I have lower concern for an infectious etiology as the patient is afebrile here and does not appear to have symptoms consistent with pneumonia except for a potential infiltrate on a chest x-ray.  I prefer to clinically reassess the patient and watch a fever curve moving forward to determine if this may represent an infectious etiology; will hold antibiotics at this time and defer to inpatient team.  I am most suspicious for worsening COPD exacerbation.  Patient ultimately with a significant chronic respiratory acidosis with chronic compensation with an elevated bicarb.  Patient is retaining with a PCO2 of 122.  Patient,  ultimately due to increasing O2 needs, will be admitted to the hospital for further cares.  Patient was given a dose of methylprednisolone while here in the emergency department.  She is chronically on prednisone at home 10 mg per day.  Patient case was discussed with Dr. Diop who agreed to inpatient admission at this time.    Of note, I did discuss patient's wishes if her heart were to stop or she were to stop breathing.  She described to me that she would want a single round of chest compressions, CPR.  She would not want to be intubated with endotracheal tube.  She understood that this would make her CODE STATUS DO NOT INTUBATE and perform only a single three-minute round of chest compressions.  She would not want resuscitation after a single round of chest compressions.    Critical Care time:  none    Diagnosis:    ICD-10-CM    1. COPD exacerbation (H) J44.1 Comprehensive metabolic panel     UA with Microscopic     Urine Culture Aerobic Bacterial     Troponin I   2. Acute on chronic respiratory failure with hypoxia (H) J96.21      Disposition:  Admitted to Dr. Ventura.    Jazzmine Tobias  7/25/2018    EMERGENCY DEPARTMENT  I, Jazzmine Tobias, am serving as a scribe at 3:51 PM on 7/25/2018 to document services personally performed by Josef Prakash MD based on my observations and the provider's statements to me.        Josef Prakash MD  07/25/18 [id number]

## 2018-07-25 NOTE — PROGRESS NOTES
[facility] now requests orders and shares plan of care/discharge summaries for some patients through [employer].  Please REPLY TO THIS MESSAGE OR ROUTE BACK TO THE AUTHOR in order to give authorization for orders when needed.  This is considered a verbal order, you will still receive a faxed copy of orders for signature.  Thank you for your assistance in improving collaboration for our patients.    MD SUMMARY/PLAN OF CARE    Dr. Nelson,     I wanted to update you that during my visit today Kelsie was having SOB at rest with 10L of O2 via nasal cannula, increased mucus production, rhonchi LS, tachycardic, and increased fatigue.   Pt was sent to [facility] ER via cab with portal O2 tank and rescue inhaler as she refused to have an ambulance called.    Thank you,    Mirza Bravo RN Case Manager  [phone]

## 2018-07-25 NOTE — ED NOTES
DATE:  7/25/2018   TIME OF RECEIPT FROM LAB:  9868  LAB TEST:  CO2  LAB VALUE:  >45  RESULTS GIVEN WITH READ-BACK TO (PROVIDER): White  TIME LAB VALUE REPORTED TO PROVIDER:   9609

## 2018-07-25 NOTE — ED NOTES
DATE:  7/25/2018   TIME OF RECEIPT FROM LAB:  0288  LAB TEST:  troponin  LAB VALUE:  0.339  RESULTS GIVEN WITH READ-BACK TO (PROVIDER):  Josef Prakash MD  TIME LAB VALUE REPORTED TO PROVIDER:   3555

## 2018-07-25 NOTE — H&P
Admitted:     07/25/2018      PRIMARY CARE PHYSICIAN:  Dr. Carlos Alberto Nelson MD.      CHIEF COMPLAINT:  Shortness of breath.      HISTORY OF PRESENT ILLNESS:  Ms. Kristine Soulier is a delightful 73-year-old  lady with a past medical history notable for end-stage COPD, followed by Dr. Myers from [facility], acute demand ischemia due to hypoxia with elevated troponin I, hypertension, rheumatoid arthritis, grade I diastolic heart failure, allergic rhinitis and anxiety who has presented with increasing shortness of breath, wheezing and cough for 1 day.  The patient appears to be a reliable historian.  The patient was recently hospitalized at [facility] from 07/06 through 07/12/2018 for acute on chronic hypoxic respiratory failure due to multilobar pneumonia as well as COPD exacerbation and discharged on oral Ceftin.  The patient is normally on home oxygen at 4 liters per minute at rest and 8 liters per minute with activities.  She was doing well until earlier today when she started having increasing shortness of breath and wheezing.  She also complains of some cough with minimal white colored sputum.  She reports no palpitation, chest pain, nausea, vomiting, diarrhea or other complaints.        In the Emergency Department, the patient has been evaluated by the ER attending physician.  Upon arrival, the patient was noted to be hypoxic in the low 80s and therefore placed on Oxymizer at 10 liters per minute.  She is saturating around 92-96% currently.  She had elevated blood pressure of 161/58 and heart rate of 102.  The electrocardiogram reveals normal sinus rhythm and minimal voltage criteria for hypertrophy.  She has hemoglobin of 10.4, which is around her baseline.  White count is 12.1.  Troponin I is mildly elevated at 0.339.  Lactic acid is 1.9.  Blood glucose is 246.  A venous blood gas shows a pH of 7.24, pCO2 of 122, and pO2 of 33.  Urinalysis shows no evidence for infection.  The chest  x-ray reveals emphysema with hyperexpansion of the lungs and flattening of the diaphragm, slightly improved right lung base infiltrate and possible trace right pleural effusion.  In the Emergency Department, the patient has been treated with DuoNeb and 125 mg of IV Solu-Medrol.  The patient is not able to tolerate BiPAP due to claustrophobia.  She is being admitted to the hospital ([facility]).      PAST MEDICAL HISTORY:   1.  End-stage COPD, O2 and steroid dependent, followed by Dr. Myers. The patient is normally on home oxygen at 4 liters per minute at rest and 8 liters per minute with activities.  2.  Recent hospitalization from  through 2018 for multilobar pneumonia associated with acute COPD exacerbation.   3.  History of positive sputum for Aspergillus and Mycobacterium avium in .   4.  Acute demand ischemia during recent hospitalizations in the setting of acute COPD exacerbation.  The recent dobutamine stress echo on 2018 was normal without evidence of ischemia.  Echocardiogram on 2018 showed LV ejection fraction of 60%-65% with no wall motion abnormality.   5.  Hypertension.   6.  Rheumatoid arthritis.   7.  Grade I diastolic congestive heart failure.   8.  Allergic rhinitis.   9.  Anxiety.   10.  Chronic anemia with baseline hemoglobin around 10.      PAST SURGICAL HISTORY:    Past Surgical History:   Procedure Laterality Date     BRONCHOSCOPY FLEXIBLE  2014    Procedure: bronchoscopy;  Surgeon: Ken Thomson MD;  Location:  GI     BRONCHOSCOPY FLEXIBLE AND RIGID  2014    Procedure: bronchoscopy;  Surgeon: Ken Thomson MD;  Location:  GI     BRONCHOSCOPY FLEXIBLE AND RIGID  2014    Procedure: bronchoscopy;  Surgeon: Ken Thomson MD;  Location: [facility]     HC COLONOSCOPY THRU STOMA, DIAGNOSTIC  2/26/10    with Colon Rectal spec. at [facility], repeat 10 yrs.     FAMILY HISTORY:  Father  of lung cancer at age of 72. Mother  of COPD at  the age of 73.      SOCIAL HISTORY:  The patient is .  She quit smoking 5 years ago.  Prior to that, she had smoked 1 pack a day for over 20 years.  She ambulates without any assistive devices.      MEDICATIONS:    Prior to Admission Medications   Prescriptions Last Dose Informant Patient Reported? Taking?   ACETAMINOPHEN PO  at PRN Self Yes Yes   Sig: Take 1,000 mg by mouth 2 times daily as needed    BROVANA 15 MCG/2ML NEBU 7/25/2018 at am Self Yes Yes   Sig: Take 15 mcg by nebulization 2 times daily    Cholecalciferol (VITAMIN D PO) 7/25/2018 at am Self Yes Yes   Sig: Take 1 tablet by mouth daily Strength unknown   ORDER FOR DME  Self No No   Sig: Equipment being ordered: Nebulizer machine and supplies    [facility]   [phone]  Fax: [phone]   ORDER FOR DME  Self No No   Sig: Equipment being ordered: Oxygen portable, O2 delivered at 3L and may wean O2 to 2.5 L as tolerated   albuterol (PROAIR HFA/PROVENTIL HFA/VENTOLIN HFA) 108 (90 Base) MCG/ACT Inhaler 7/25/2018 at am Self Yes Yes   Sig: Inhale 2 puffs into the lungs every 6 hours as needed for shortness of breath / dyspnea or wheezing   budesonide (PULMICORT) 0.5 MG/2ML nebulizer solution 7/25/2018 at am Self Yes Yes   Sig: Take 0.5 mg by nebulization 2 times daily    calcium carbonate-vitamin D 600-200 MG-UNIT TABS 7/25/2018 at am Self Yes Yes   Sig: Take 1 tablet by mouth daily    hydrOXYzine (ATARAX) 10 MG tablet  Self No Yes   Sig: Take 1-2 tablets (10-20 mg) by mouth 3 times daily as needed for anxiety   ipratropium - albuterol 0.5 mg/2.5 mg/3 mL (DUONEB) 0.5-2.5 (3) MG/3ML neb solution  at PRN Self No Yes   Sig: Take 1 vial (3 mLs) by nebulization every 6 hours   predniSONE (DELTASONE) 10 MG tablet 7/25/2018 at am Self No Yes   Sig: Take 1 tablet (10 mg) by mouth daily      Facility-Administered Medications: None        ALLERGIES:  NO KNOWN DRUG ALLERGIES.      REVIEW OF SYSTEMS:  A 10-point review of system was performed thoroughly  and was negative except as stated in the history of present illness.      PHYSICAL EXAMINATION:   GENERAL:  This patient is a very frail-appearing elderly lady who is in mild respiratory distress.   VITAL SIGNS:  Blood pressure 161/58, heart rate 102, respiratory rate 18, temperature 98.2 degrees Fahrenheit, oxygen saturation 96% on 10 liters.   HEENT:  Pupils are round and equal and reactive to light bilaterally.  There is no conjunctival pallor or scleral icterus.  Oral mucosa appears moist.   NECK:  Supple, there is no evidence of JVP.   LUNGS:  There are diminished breath sounds throughout the lung fields.  I could not appreciate any wheezing, rhonchi or crackles.  Air movement is minimal.   CARDIOVASCULAR:  There is normal S1 and S2 with regular rate and rhythm.   ABDOMEN:  Soft, nontender, nondistended.  Bowel sounds are present.   EXTREMITIES:  There is no significant lower extremity edema.  There is no calf tenderness.   SKIN:  There is no jaundice, cyanosis or acute rash.   NEUROLOGIC:  She is awake, alert, oriented x 3.  No focal deficit on gross examination.      LABORATORY DATA:   1.  Chemistry profile:  Sodium 142, potassium 2.5, BUN 23, creatinine 0.38, albumin 2.8, lactic acid 1.9, glucose 246.  Troponin I at 0.339.   2.  Hematology profile:  White count 12.1, hemoglobin 10.4, platelet count 244,000.     3.  Urinalysis shows clear urine, negative nitrite and negative leukocyte esterase.      ASSESSMENT AND PLAN:  Ms. Kristine Soulier is a delightful 73-year-old  lady with a past medical history notable for end-stage steroid and oxygen dependent chronic obstructive pulmonary disease, hypertension, rheumatoid arthritis, anxiety and chronic anemia, who has presented with increasing shortness of breath and wheezing for 1 day.   1.  Acute exacerbation of chronic obstructive pulmonary disease with acute on chronic hypoxic and hypercapnic respiratory failure.  The patient has known history of  end-stage steroid and oxygen dependent chronic obstructive pulmonary disease.   The patient has presented with 1 day of increasing wheezing and shortness of breath.  At home, she is on 4 liters of oxygen at rest and 8 liters of oxygen with activities.  In the ER, she is requiring 10 liters per minute of O2 via oximyzer and saturating around 94%.  The chest x-ray shows emphysematous changes with hyperexpansion of the lungs and flattening of the hemidiaphragm and improvement of right lung infiltrate.  The venous blood gas shows pH of 7.24, pCO2 of 122 and PO2 of 33.  The patient has been treated with DuoNeb and IV Solu-Medrol in the Emergency Department with some improvement in her respiratory symptoms.  The patient states she is not able to tolerate BiPAP due to claustrophobia.  She will be admitted to [facility].  We will continue with Oxymizer/high flow oxygen and wean as able.  I will order a DuoNeb every 6 hours as well as p.r.n. albuterol.  I will place an ordere RCAT.  I will continue high dose steroid therapy with IV Solu-Medrol at 62.5 mg every 6 hours.  I will continue her prior to admission Pulmicort. At home notably, she is on 10 mg of prednisone daily.  I will place a consult for Pulmonary Service.  I will check a procalcitonin level.  If it comes back elevated, I will start the patient on antibiotic.  Notably, she was recently hospitalized from 07/06 through 07/12 for chronic obstructive pulmonary disease exacerbation due to multilobar pneumonia and treated with antibiotic and discharged on oral cefdinir.  Given her advanced chronic obstructive pulmonary disease and multiple hospitalizations for acute COPD exacerbation, I briefly discussed the goal of care.  She is receptive to have more conversation with Palliative Care Service.  Therefore I will place a consult for them.   2.  Non-ST elevation myocardial infarction:  Troponin I is elevated at 0.339.  The patient reports no chest pain.  The electrocardiogram  reveals normal sinus rhythm and minimal voltage pressure for left ventricular hypertrophy. During the most recent hospitalizations, the patient had also elevated troponin I. The echocardiogram on 2018 showed ejection fraction of 60%-65% and no wall motion abnormality.  Previously she also had a normal dobutamine stress echo on 2018.  I suspect this is due to again acute hypoxia and demand ischemia but probably she also has underlying coronary artery disease.  I would repeat the troponin I level to follow trends.  Given her acute chronic obstructive pulmonary disease exacerbation I will avoid beta blocker.  I will start her on diltiazem 120 mg p.o. daily, which can be up titrated.  I will start the patient on aspirin and place the patient on IV heparin.  I will ask the Cardiology Service to evaluate the patient.   3.  Hyperglycemia:  Blood glucose is 246 and elevated.  This is likely steroid mediated.  I will place the patient on a sliding scale NovoLog and check hemoglobin A1c level.   4.  Hypertension:  The patient is not on any blood pressure medications.  In the ER, her blood pressure is elevated at 161/58.  I will start her on Cardizem oral as outlined above.  I will have IV labetalol available p.r.n.  Her blood pressure will be closely monitored.   5.  Deep venous thrombosis prophylaxis:  IV heparin as above.   6.  Code status:  It was discussed, and the patient states she wants to have CPR for only one time if she developed cardiopulmonary arrest.  The patient does not wish to be intubated.  This was entered as a special code.   7.  Disposition:  I anticipate more than 48 hours of hospital stay.      I would like to thank Dr. Carlos Alberto Nelson for allowing the Hospitalist Service to participate in the care of this patient.         TONY DORSEY MD             D: 2018   T: 2018   MT: IVONNE      Name:     SOULIER, KRISTINE   MRN:      [id number]        Account:      [id number]   :       1945        Admitted:     07/25/2018                   Document: [id number]       cc: Carlos Alberto Nelson MD

## 2018-07-25 NOTE — ED NOTES
"[facility]  ED Nurse Handoff Report    ED Chief complaint: Shortness of Breath (hx copd. nomrally uses 4L O2 at home and 8L O2 with exertion. today is SOB at rest and requiring 10L O2 at rest. sob got worse this am. )      ED Diagnosis:   Final diagnoses:   COPD exacerbation (H)   Acute on chronic respiratory failure with hypoxia (H)       Code Status: being discussed by provider    Allergies: No Known Allergies    Activity level - Baseline/Home:  Stand with Assist    Activity Level - Current:   Stand with Assist     Needed?: No    Isolation: No  Infection: Not Applicable  Bariatric?: No    Vital Signs:   Vitals:    07/25/18 1540 07/25/18 1620   BP: 161/58    Pulse: 107    Resp: 18    Temp: 98.2  F (36.8  C) 98.6  F (37  C)   TempSrc: Oral Rectal   SpO2: 96%    Weight: 45.4 kg (100 lb)    Height: 1.6 m (5' 3\")        Cardiac Rhythm: ,        Pain level: 0-10 Pain Scale: 0    Is this patient confused?: No   [place] - Suicide Severity Rating Scale Completed?  Yes  If yes, what color did the patient score?  White    Patient Report: Initial Complaint: SOB  Focused Assessment:   Patient has COPD, usually on 4L at home. Today was requiring 8-10L NC to stay about 90%. Patient in upper 80s on 8-10L NC on arrival. Pt staying around 90-91% on 10L oxymask. Did 3 duonebs, states no big difference in how she feels. Pt recently diagnosed with pneumonia around a week ago.   Tests Performed: labs, UA/UC, xray  Abnormal Results: troponin 0.339, CO2>45, see blood gas results, still waiting on some results to come in. Please view all lab results  Treatments provided: nebulizers, O2    Family Comments: not present    OBS brochure/video discussed/provided to patient: N/A    ED Medications:   Medications   ipratropium - albuterol 0.5 mg/2.5 mg/3 mL (DUONEB) neb solution 3 mL (3 mLs Nebulization Given 7/25/18 1702)   methylPREDNISolone sodium succinate (solu-MEDROL) injection 125 mg (125 mg Intravenous Given " 7/25/18 5908)       Drips infusing?:  No    For the majority of the shift this patient was Green.   Interventions performed were n/a.    Severe Sepsis OR Septic Shock Diagnosis Present: No      ED NURSE PHONE NUMBER: [phone]

## 2018-07-25 NOTE — PHARMACY-ADMISSION MEDICATION HISTORY
Admission medication history interview status for the 7/25/2018  admission is complete. See EPIC admission navigator for prior to admission medications     Medication history source reliability:Good     Actions taken by pharmacist (provider contacted, etc):None     Additional medication history information not noted on PTA med list :Spoke with patient, reviewed surescripts and recent discharge summary.     Medication reconciliation/reorder completed by provider prior to medication history? No    Time spent in this activity: 20 min    Prior to Admission medications    Medication Sig Last Dose Taking? Auth Provider   ACETAMINOPHEN PO Take 1,000 mg by mouth 2 times daily as needed   at PRN Yes Reported, Patient   albuterol (PROAIR HFA/PROVENTIL HFA/VENTOLIN HFA) 108 (90 Base) MCG/ACT Inhaler Inhale 2 puffs into the lungs every 6 hours as needed for shortness of breath / dyspnea or wheezing 7/25/2018 at am Yes Carlos Alberto Nelson MD   BROVANA 15 MCG/2ML NEBU Take 15 mcg by nebulization 2 times daily  7/25/2018 at am Yes Reported, Patient   budesonide (PULMICORT) 0.5 MG/2ML nebulizer solution Take 0.5 mg by nebulization 2 times daily  7/25/2018 at am Yes Reported, Patient   calcium carbonate-vitamin D 600-200 MG-UNIT TABS Take 1 tablet by mouth daily  7/25/2018 at am Yes Mariah Worley NP   Cholecalciferol (VITAMIN D PO) Take 1 tablet by mouth daily Strength unknown 7/25/2018 at am Yes Reported, Patient   hydrOXYzine (ATARAX) 10 MG tablet Take 1-2 tablets (10-20 mg) by mouth 3 times daily as needed for anxiety  Yes Carlos Alberto Nelson MD   ipratropium - albuterol 0.5 mg/2.5 mg/3 mL (DUONEB) 0.5-2.5 (3) MG/3ML neb solution Take 1 vial (3 mLs) by nebulization every 6 hours  at PRN Yes Mayela Louie MD   predniSONE (DELTASONE) 10 MG tablet Take 1 tablet (10 mg) by mouth daily 7/25/2018 at am Yes Alice Mortensen MD   ORDER FOR DME Equipment being ordered: Oxygen portable, O2 delivered at 3L and may wean  O2 to 2.5 L as tolerated   Kathia Orta APRN CNP   ORDER FOR DME Equipment being ordered: Nebulizer machine and supplies    [facility]   [phone]  Fax: [phone]   Kathia Orta APRN CNP Sofia Shrestha, PharmD IV Student

## 2018-07-26 NOTE — PROGRESS NOTES
SPIRITUAL HEALTH SERVICES Progress Note  [facility]    Brief visit with pt and two of her daughters.    All were quiet, and admitted to having just had a difficult conversation (with palliative care team nurse practitioner).  Pt said she appreciated my offer of care and support.  Pt agreed to prayer, and expressed gratitude for my visit.  I will plan to check in with pt again tomorrow.     team available if sudden needs arise.                                                                                                                                                 Nilda Castillo M.A.  Staff   Pager [phone]  Phone [phone]

## 2018-07-26 NOTE — PLAN OF CARE
Problem: Patient Care Overview  Goal: Plan of Care/Patient Progress Review  Pt placed on comfort care. Maintaining on 6 L O2. Up to BSC with A1. Plan to transfer to medical unit for continued comfort care.      note is to contact Miguelina at work number in chart to discuss financial information.

## 2018-07-26 NOTE — PROGRESS NOTES
[facility]  Patient is currently open to home care services with [facility]. The patient is currently receiving Skilled Nursing/PT/OT/SW services. [facility]  and team have been notified of patient admission. [facility] liaison will continue to follow patient during stay. If appropriate provide orders to resume home care at time of discharge.

## 2018-07-26 NOTE — PROGRESS NOTES
..MD Notification    Notified Person: MD    Notified Person Name: faustino Traylor    Notification Date/Time:1455 07/26/2017    Notification Interaction:texted page    Purpose of Notification: PT. became DNR/DNI-comfort cares after palliative meeting.. Asking to Transfer pt to med floor     Orders Received:    Comments:
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
## 2018-07-26 NOTE — PROVIDER NOTIFICATION
Brief update:    Paged re: critical elevation in bicarb (>45)    Patient has had respiratory acidosis by VBG and historical ABGs.  Here with COPD exacerbation.    Patient is DNI, and has been refusing BiPAP treatment.  Receiving steroids as well as nebulizer treatments.    Encourage BiPAP treatment for significant hypercarbia.  Limited utility to repeating VBG or ABG at this time if patient is declining positive pressure treatment.    Note palliative consult for later today    Binh Miguel MD  5:29 AM

## 2018-07-26 NOTE — CONSULTS
CLINICAL NUTRITION SERVICES  -  ASSESSMENT NOTE      Recommendations Ordered by Registered Dietitian (RD):     Medical Food Supplement: will send a Boost shake at 10am and 2pm (these will provide 750 darrius and 20 gm pro)     Malnutrition: (7/26)  % Weight Loss:  > 5% in 1 month (severe malnutrition)  % Intake:  No decreased intake noted  Subcutaneous Fat Loss:  Chronic   Muscle Loss:  Chronic  Fluid Retention:  None noted    Malnutrition Diagnosis: Patient does not meet two of the above criteria necessary for diagnosing malnutrition        REASON FOR ASSESSMENT  Kristine L Soulier is a 73 year old female seen by Registered Dietitian for Admission Nutrition Risk Screen - Reduced oral intake over the last month      NUTRITION HISTORY  Visited with pt this morning  She tells me that she has been eating her usual meals since recent admit (dc'd ~2 weeks ago)  She drinks 2 Ensure supplements per day + meals (likes chocolate and vanilla flavors)  Notes that her po intake hasn't changed and she in not sure why she has been losing wt    Pt was recently seen by RD (7/9):  - Pt lives in a senior facility where she provides meals for self 2-3 times daily. She obtains groceries by taking a communal van from the facility once every other week.  - Typical intake includes:                        Breakfast: Cheerio's, toast, hard boiled eggs, or Kashmir Behzad breakfast sandwiches                        Lunch: Sometimes skips or snacks on cheese and crackers                        Dinner: Cooks a large meal on Sunday for the week (lasagna, ribs, etc)                        Snacks: Ice cream every night          CURRENT NUTRITION ORDERS  Diet Order:     Regular     Pt tells me that she ate 100% of her breakfast today - eggs, hash browns, OJ  (436 cals, 16 gm pro)    She is agreeable to taking a Boost shake BID between meals (each provides 375 cals and 10 gm pro)      PHYSICAL FINDINGS  Observed  Muscle Wasting  - clavicle, temples (appears  "chronic)  Fat wasting - upper arms (appears chronic)    Obtained from Chart/Interdisciplinary Team  Cachectic   Frail  No edema  Coccyx reddened - WOCN to consult    ANTHROPOMETRICS  Height: 5' 3\"  Weight:(7/26) 42.9 kg / 94 lbs 9.6 oz  Body mass index is 16.76 kg/(m^2).  Weight Status:  Underweight BMI <18.5  IBW: 52.3 kg  % IBW: 82%  Weight History: Records indicate pt has lost ~8-10# (9%) over the past 2 weeks.  Pt not aware of wt loss and isn't sure how she lost wt as she has been eating normally  Wt Readings from Last 10 Encounters:   07/26/18 42.9 kg (94 lb 9.6 oz)   07/17/18 45.7 kg (100 lb 12 oz)   07/12/18 48.9 kg (107 lb 12.9 oz)   06/18/18 45.2 kg (99 lb 11.2 oz)   06/08/18 46.5 kg (102 lb 8 oz)   05/25/18 46.5 kg (102 lb 8 oz)   05/17/18 47.9 kg (105 lb 9.6 oz)   05/14/18 47.9 kg (105 lb 8 oz)   11/03/17 47.6 kg (105 lb)   07/11/17 50 kg (110 lb 4 oz)         LABS  Labs reviewed    MEDICATIONS  Medications reviewed      ASSESSED NUTRITION NEEDS PER APPROVED PRACTICE GUIDELINES:    Dosing Weight: 42.9 kg    Estimated Energy Needs: 1205-6252 kcals (30-35 Kcal/Kg)  Justification: underweight  Estimated Protein Needs: 50-65 grams protein (1.2-1.5 g pro/Kg)  Justification: preservation of lean body mass      MALNUTRITION:  % Weight Loss:  > 5% in 1 month (severe malnutrition)  % Intake:  No decreased intake noted  Subcutaneous Fat Loss:  Chronic   Muscle Loss:  Chronic  Fluid Retention:  None noted    Malnutrition Diagnosis: Patient does not meet two of the above criteria necessary for diagnosing malnutrition      NUTRITION DIAGNOSIS:  Unintended weight loss related to unclear reasons (pt reports no change in her eating, could be higher demand with COPD exac) as evidenced by pt with 9% wt loss over the past 2 weeks      NUTRITION INTERVENTIONS  Recommendations / Nutrition Prescription  Regular diet  Nutrition supplements  .      Implementation  Nutrition education: Per Provider order if indicated   Medical " Food Supplement: will send a Boost shake at 10am and 2pm (these will provide 750 darrius and 20 gm pro)  .      Nutrition Goals  Pt to consume 50% meals and take 100% of the supplements being sent BID  .      MONITORING AND EVALUATION:  Progress towards goals will be monitored and evaluated per protocol and Practice Guidelines

## 2018-07-26 NOTE — PROGRESS NOTES
Consultation in progress, completed later today.     Kailey  [facility]  [phone]    I came to see patient but in the interim, Palliative Care was consulted and patient opted for comfort care. Pulmonary consultation was cancelled, I checked with her RN. Please let us know if we can help at any time. Thank you.     Kailey

## 2018-07-26 NOTE — PLAN OF CARE
Problem: Patient Care Overview  Goal: Plan of Care/Patient Progress Review  Outcome: No Change  Palliative care met with family. Pt became  DNR/DNI code status ---comfort care. IV heparin dcd. Tele dcd. ID band updated with new code status.

## 2018-07-26 NOTE — PROGRESS NOTES
[facility] Nurse Inpatient Adult Pressure Injury (PI) Assessment     Initial Assessment of PI(s) on pt's:   Coccyx/inner buttocks    Data:   Patient History:      per MD note(s): Ms. Kristine Soulier is a delightful 73-year-old  lady with a past medical history notable for end-stage COPD, followed by Dr. Myers from [facility], acute demand ischemia due to hypoxia with elevated troponin I, hypertension, rheumatoid arthritis, grade I diastolic heart failure, allergic rhinitis and anxiety who has presented with increasing shortness of breath, wheezing and cough for 1 day.       Attila Assessment and sub scores:   Attila Score  Av.9  Min: 17  Max: 21    Positioning: Pillows    Mattress:  Standard , Atmos Air mattress       Moisture Management:  Incontinence Protocol prn only      Current Diet / Nutrition:           Active Diet Order      Combination Diet Regular Diet Adult    Labs:   Recent Labs   Lab Test  18   0435  18   1605   03/12/15   1220   ALBUMIN   --   2.8*   < >  3.6   HGB  9.6*  10.4*   < >  11.5*   WBC  6.6  12.1*   < >  10.1   A1C   --   5.5   --    --    CRP   --    --    --   8.0    < > = values in this interval not displayed.                                                                                                                          Pressure Injury Assessment  (location):   Coccyx   Wound History:   Pt very thin and bony, protruding coccyx, BMI 16.  Pt able to stand independently at assessment today.     Wound Base: tissue appears intact throughout, though very thin and fragile in spots.  There is scattered pink-tan erythema vs chronic pigmentation, difficult to fully assess blanching, but there does not appear to be any acute injury.      Periwound Skin: mild erythema, bony    Drainage:  n/a         Odor: n/a    Pain:  minimal           Intervention:     Patient's chart evaluated.      Attila Interventions:  Current Attila Interventions and  "Care Plan reviewed and updated, appropriate at this time.    Skin assessed, Mepilex applied, pillow placed under pt    Orders  Written    Supplies  Reviewed; offered to order chair cushion but pt declined    Discussed plan of care with Patient and Nurse           Assessment:     Pressure Injury (PI) located on coccyx: no active PI.  Skin appears intact.  Some of the pink-tan discoloration is likely chronic pigmentation.  Pt at high risk for breakdown, as coccyx area very protruding and bony, BMI 16.  Will try using Mepilex for added comfort and protection.  Pt able to stand/reposition independently and verbalized understanding of PIP measures.            Plan:     Nursing to notify the Provider(s) and re-consult the [facility] Nurse if wound(s) deteriorate(s)or if the wound care plan needs reevaluation.    If pt is refusing to turn or reposition they must be educated on the  potential injury from not off loading pressure.  Then this \"educated refusal\" needs to be documented as an \"educated refusal to turn/ reposition\" and document if alert, etc.      Plan for PIP (pressure injury prevention) and skin care to coccyx:    - cover coccyx with Mepilex 4x4 if pt desires, change every other day and prn  - remind/assist pt to reposition every 1-2 hrs, side to side  - HOB as low as tolerated  - pillow under and behind pt when in chair  - pt should stand for a few minutes every 1 hr in chair    [facility] Nurse will return: weekly and prn    "

## 2018-07-26 NOTE — PLAN OF CARE
Problem: Patient Care Overview  Goal: Plan of Care/Patient Progress Review  Outcome: No Change  BP elevated, gave scheduled diltiazem. Denies pain. C/o SOB. SaO2 93% on 5LPM oxymizer. LS coarse throughout. Pt desats quickly with activity to 70's and recovers slowly. Up with SBA to BSC. Heparin gtt @ 550u/hr. Discussed with pt importance of shifting weight off bottom, pt verbalized understanding. Continue to monitor closely.

## 2018-07-26 NOTE — CONSULTS
Care Transition Initial Assessment - SW  Reason For Consult: financial concerns, end of life/hospice  Met with: Patient and Family  (daughter's Carmelita and Miguelina)  Active Problems:    COPD exacerbation (H)       DATA  Lives With: alone  Living Arrangements: apartment  Description of Support System: Supportive, Involved  Who is your support system?: Children  Support Assessment: Adequate family and caregiver support.   Identified issues/concerns regarding health management:       Quality Of Family Relationships: supportive, involved     Per social service consult for hospice.  Patient was admitted on 7-25-18 with COPD exacerbation.  The tentative date of discharge is yet to be determined.  Reviewed chart and spoke with patient and her two daughter's Carmelita and Miguelina regarding discharge plans.  Per patient's daughter's report, patient lives alone in an apartment.  Patient is fairly independent at baseline.  Patient uses 4 liters of home oxygen at rest and 8 liters of home oxygen with activity at baseline.  Patient is open to [facility] for RN/PT/OT/SW.  Explained that I have received an order to see patient for a hospice consult.  Patient and daughter's state that patient's finances are very limited and they are asking to apply for MA.  Explained that I can contact the financial office and request that they follow up with them.  Call placed and message left for Raheel from the financial office requesting that he follow up with patient and family.  Also requested that he follow up with me.  Patient and family are also asking to complete an [facility] application.  Explained that we can complete the application and make the referral.   Call placed to [facility] to check bed availability.  Per Jo Ann, they have no available beds until possibly early next week.  Jo Ann states we can fax the application.  Faxed the application and placed it o the front of chart.  Also inquired as to making a referral to a  hospice agency to start the process in case [facility] does not have any beds and patient is eligible for MA.  Patient and daughter's are in agreement.  Patient and daughter's have no preference as to hospice agencies.  Referral made to Vanessa from [facility].  She can meet tomorrow at 13:00.  Updated patient and daughter's.  They are in agreement.  Confirmed with Vanessa.      ASSESSMENT  Cognitive Status:  awake, alert and oriented  Concerns to be addressed: hospice and financial concerns.     PLAN  Financial costs for the patient includes hospice costs.  Patient given options and choices for discharge Hospice agencies.  Patient/family is agreeable to the plan?  Yes  Patient Goals and Preferences: Hospice care on discharge.  Patient anticipates discharging to:  TBD.    Will continue to follow and assist with a safe discharge plan.    YOLA Hill, [facility]  [phone]

## 2018-07-26 NOTE — PLAN OF CARE
Problem: Patient Care Overview  Goal: Plan of Care/Patient Progress Review  Outcome: Improving  Pt VSS, weaned down to 3 L oximyzer cannula. Tele SR. A&Ox4. Up with SBA, using commode at bedside. Denies pain. Slept. Plan for palliative, WOC, and cards consult. Continue w/ nebs and steroid therapy. Continue to monitor.

## 2018-07-26 NOTE — PROVIDER NOTIFICATION
MD Notification    Notified Person: MD    Notified Person Name: Miguel    Notification Date/Time: 07/26/18 5:21 AM     Notification Interaction: Talked with MD    Purpose of Notification: Critical lab value of Co2 >45    Orders Received: Recommend bipap    Comments:Pt refusing this intervention

## 2018-07-27 NOTE — PLAN OF CARE
Problem: Patient Care Overview  Goal: Plan of Care/Patient Progress Review  PT:  Discharge Planner PT   Patient plan for discharge: hospice facility  Current status: Patient has transitioned to comfort cares with discharge to a facility likely planned. Pt has no skilled inpatient PT needs at this time. PT to sign off.   Barriers to return to prior living situation: None  Recommendations for discharge: DC with hospice as planned  Rationale for recommendations: No skilled PT needs present.       Entered by: Yuki Maynard 07/27/2018 11:58 AM

## 2018-07-27 NOTE — PROGRESS NOTES
[facility]    Hospitalist Progress Note    Date of Service (when I saw the patient): 07/26/2018    Assessment & Plan    Ms. Kristine Soulier is a delightful 73-year-old  lady with a past medical history notable for end-stage steroid and oxygen dependent chronic obstructive pulmonary disease, hypertension, rheumatoid arthritis, anxiety and chronic anemia, who has presented with increasing shortness of breath and wheezing for 1 day.     Acute exacerbation of COPD with acute on chronic hypoxic and hypercapnic respiratory failure.  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   The patient has presented with 1 day of increasing wheezing and shortness of breath.  At home, she is on 4 liters of oxygen at rest and 8 liters of oxygen with activities.  In the ER, she is requiring 10 liters per minute of O2 via oximyzer and saturating around 94%.  The chest x-ray showed emphysematous changes with hyperexpansion of the lungs and flattening of the hemidiaphragm and improvement of right lung infiltrate. VBG pH of 7.24, pCO2 of 122 and pO2 of 33.    - not able to tolerate BiPAP due to claustrophobia.  She will be admitted to [facility].    - Oxymizer/high flow oxygen and wean as able.    - DuoNeb every 6 hours as well as p.r.n. albuterol.  I will place an ordere RCAT.    - Change IV solumedrol to PO prednisone (first dose 7/27) with taper.     Goals of Care   Transition to Comfort Cares on 07/26/18 Notably, she was recently hospitalized from 07/06 through 07/12 for chronic obstructive pulmonary disease exacerbation due to multilobar pneumonia and treated with antibiotic and discharged on oral cefdinir.  Given her advanced chronic obstructive pulmonary disease and multiple hospitalizations for acute COPD exacerbation, and was receptive to a more palliative approach.   - Appreciate palliative care consult on 07/26/18. Patient appropriately decided  to transition to comfort care approach.     Demand ischemia -  Troponin I is elevated at 0.339 at admission and downtrending.  She reported no chest pain. EKG showed NSR and minimal voltage pressure for left ventricular hypertrophy. During the most recent hospitalizations, the patient had also elevated troponin I. The echocardiogram on 07/07/2018 showed ejection fraction of 60%-65% and no wall motion abnormality.  Previously she also had a normal dobutamine stress echo on 06/18/2018.   - I do not suspect ACS and given goals of care > stopped heparin, ASA and diltiazem.    Hyperglycemia:  Blood glucose is 246 and elevated.  This is likely steroid mediated.     Recent Labs  Lab 07/26/18  0435 07/25/18  1605   * 246*     - BS checks stopped with transition to comfort cares.     Hypertension:   - Noted on admission. Will not treat given goals of care or comfort.     Deep venous thrombosis prophylaxis:  PCDs    Code status:    - DNR/DNI per discussion with palliative care.     Disposition: Will discharge on hospice once placement is determined, too weak to return home.     # Pain Assessment:  Current Pain Score 7/26/2018   Patient currently in pain? denies   Pain score (0-10) -   Pain descriptors -   CPOT pain score -   Anay nuno pain level was assessed and she currently denies pain.      Communication: Discussed with palliative care, patient and daughters on 07/26/18    Brina Traylor  [phone] (p)  Text Page (7AM - 6PM)     Interval History   Patient's breathing is stable, she is comfortable. Discussed transition to comfort care approach and patient and daughters are all in agreement. Patient knows she can not go home, so social work is looking into placement[facility]     -Data reviewed today: I reviewed all new labs and imaging results over the last 24 hours. I personally reviewed no images or EKG's today.    Physical Exam   Temp: 98.2  F (36.8  C) Temp src: Oral BP: (!)  166/92   Heart Rate: 67 Resp: 20 SpO2: 99 % O2 Device: Oxymizer cannula Oxygen Delivery: Other (Comments) (3.5 L)  Vitals:    07/25/18 1540 07/26/18 0600   Weight: 45.4 kg (100 lb) 42.9 kg (94 lb 9.6 oz)     Vital Signs with Ranges  Temp:  [97.5  F (36.4  C)-98.4  F (36.9  C)] 98.2  F (36.8  C)  Heart Rate:  [] 67  Resp:  [14-32] 20  BP: (131-185)/() 166/92  SpO2:  [88 %-100 %] 99 %  I/O last 3 completed shifts:  In: 600 [P.O.:600]  Out: 450 [Urine:450]    Constitutional:  NAD, cachetic  Neuropsyche:  alert and oriented, answers questions appropriately.   Respiratory:  Breathing comfortably, marked decreased air exchange, no wheezes currently.   Cardiovascular:  Regular rate and rhythm, no edema.  GI:  soft, NT/ND, BS normal  Skin/Integumen: No acute rash or sign of bleeding.     Medications   All medications reviewed on 07/26/18        budesonide  0.5 mg Nebulization BID     ipratropium - albuterol 0.5 mg/2.5 mg/3 mL  3 mL Nebulization 4x daily     [START ON 7/27/2018] predniSONE  40 mg Oral Daily     senna-docusate  1 tablet Oral BID    Or     senna-docusate  2 tablet Oral BID       Data     Recent Labs  Lab 07/26/18  0435 07/26/18  0200 07/25/18  2000 07/25/18  1605   WBC 6.6  --   --  12.1*   HGB 9.6*  --   --  10.4*   *  --   --  109*     --   --  244     --   --  142   POTASSIUM 4.4  --   --  3.5   CHLORIDE 96  --   --  93*   CO2 >45*  --   --  >45*   BUN 20  --   --  23   CR 0.30*  --   --  0.38*   ANIONGAP Not Calculated  --   --  Not Calculated   ISADORA 8.2*  --   --  8.6   *  --   --  246*   ALBUMIN  --   --   --  2.8*   PROTTOTAL  --   --   --  7.8   BILITOTAL  --   --   --  0.3   ALKPHOS  --   --   --  74   ALT  --   --   --  24   AST  --   --   --  18   TROPI  --  0.281* 0.298* 0.339*       No results found for this or any previous visit (from the past 24 hour(s)).

## 2018-07-27 NOTE — PROGRESS NOTES
At attempt to turn pt requested to not be disturbed for rest of shift. On comfort cares. Will continue to monitor without awaking.

## 2018-07-27 NOTE — PROGRESS NOTES
Hospice: Met with patient, daughter Carmelita and son Nnamdi to explain the hospice program. The family is concerned about being able to provide the care at home for her. She has applied for MA and I explained she could go to a skilled nursing facility with hospice cares. Patient very concerned about family and financial issues. We talked about [facility] and I followed up with a phone call to Jes at the facility who stated they have a bed for her for Monday. The patient and family are in agreement with this plan. I explained that [facility] does all their own hospice at the facility and  would not be involved. They verbalized understanding. [facility] states they like their patients their by 10am. I updated Roberto Whitmore  about this plan. Thank you . Vanessa Panchal RN, BSN   Hospice Admission nurse  [phone]

## 2018-07-27 NOTE — PLAN OF CARE
Problem: Patient Care Overview  Goal: Plan of Care/Patient Progress Review  Outcome: No Change  Pt refused all assessments this shift.  Using 6L oxygen via oxymizer.  Comfort cares.  Denies pain.  Up w/SBA.  Voiding adequately.  PIV SL.  Regular diet.  Nursing to continue to monitor.

## 2018-07-27 NOTE — PROGRESS NOTES
MARIA C Note:    D/I:  SW is following for discharge planning.   Received a call from [facility] [phone] and they would have a bed available for patient on Monday if she was still wanting to come to their facility.  Patient is meeting at 1300 today with  Hospice and will have decision about discharge plan.    Addendum (1400):  Spoke with  Hospice Liaison Vanessa who said that patient would like to move forward with [facility] for Monday.  SW placed call and confirmed bed with [facility].  Left VM.     P:  SW will continue to follow for discharge.    Roberto Whitmore, YOLA, LGSW

## 2018-07-27 NOTE — PROGRESS NOTES
[facility]    Hospitalist Progress Note    Date of Service (when I saw the patient): 07/27/2018    Assessment & Plan    Ms. Kristine Soulier is a delightful 73-year-old  lady with a past medical history notable for end-stage steroid and oxygen dependent chronic obstructive pulmonary disease, hypertension, rheumatoid arthritis, anxiety and chronic anemia, who has presented with increasing shortness of breath and wheezing for 1 day.     Acute exacerbation of COPD with acute on chronic hypoxic and hypercapnic respiratory failure.  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   The patient has presented with 1 day of increasing wheezing and shortness of breath.  At home, she is on 4 liters of oxygen at rest and 8 liters of oxygen with activities.  In the ER, she is requiring 10 liters per minute of O2 via oximyzer and saturating around 94%.  The chest x-ray showed emphysematous changes with hyperexpansion of the lungs and flattening of the hemidiaphragm and improvement of right lung infiltrate. VBG pH of 7.24, pCO2 of 122 and pO2 of 33.    - Not able to tolerate BiPAP due to claustrophobia.  She will be admitted to [facility].    - Oxymizer/high flow oxygen and wean as able.    - DuoNeb every 6 hours as well as p.r.n. albuterol.  RCAT ordered  - Changed IV solumedrol to PO prednisone (first dose 7/27) with taper.     Goals of Care   Transition to Comfort Cares on 07/26/18 Notably, she was recently hospitalized from 07/06 through 07/12 for chronic obstructive pulmonary disease exacerbation due to multilobar pneumonia and treated with antibiotic and discharged on oral cefdinir.  Given her advanced chronic obstructive pulmonary disease and multiple hospitalizations for acute COPD exacerbation, and was receptive to a more palliative approach.   - Appreciate palliative care consult on 07/26/18. Patient appropriately decided to transition to  comfort care approach.   - Considering going home with help from family vs LTC on hospice.    Deep venous thrombosis prophylaxis:  PCDs    Code status:    - DNR/DNI per discussion with palliative care.     Disposition: Will discharge on hospice once placement is determined.     # Pain Assessment:  Current Pain Score 7/27/2018   Patient currently in pain? denies   Pain score (0-10) -   Pain descriptors -   CPOT pain score -   Anay nuno pain level was assessed and she currently denies pain.      Communication: Discussed with patient and family at bedside, care coordinator on 07/27/18    RESOLVED OR STABLE PROBLEMS  Demand ischemia -  Troponin I is elevated at 0.339 at admission and downtrending.  She reported no chest pain. EKG showed NSR and minimal voltage pressure for left ventricular hypertrophy. During the most recent hospitalizations, the patient had also elevated troponin I. The echocardiogram on 07/07/2018 showed ejection fraction of 60%-65% and no wall motion abnormality.  Previously she also had a normal dobutamine stress echo on 06/18/2018.   - I do not suspect ACS and given goals of care > stopped heparin, ASA and diltiazem.  - I see cardiology saw patient. Unfortunately this was after I had discontinued the ordered and called to ensure it would be communicated to cardiologist. Thank you for seeing the patient. I apologize for the mis-communication.     Hyperglycemia:  Blood glucose is 246 and elevated.  This is likely steroid mediated.     Recent Labs  Lab 07/26/18  0435 07/25/18  1605   * 246*     - BS checks stopped with transition to comfort cares.     Hypertension:   - Noted on admission. Will not treat given goals of care or comfort.     Brina Traylor  [phone] (p)  Text Page (7AM - 6PM)     Interval History   She states she is breathing comfortably, has no concerns. No abdominal pain or chest pain.  We discussed discharge plan and she brings up going home with family present to help.      -Data reviewed today: I reviewed all new labs and imaging results over the last 24 hours. I personally reviewed no images or EKG's today.    Physical Exam   Temp: 98  F (36.7  C) Temp src: Oral BP: 135/64   Heart Rate: 68 Resp: 16 SpO2: 95 % O2 Device: Oxymizer cannula Oxygen Delivery: 6 LPM  Vitals:    07/25/18 1540 07/26/18 0600   Weight: 45.4 kg (100 lb) 42.9 kg (94 lb 9.6 oz)     Vital Signs with Ranges  Temp:  [98  F (36.7  C)-98.4  F (36.9  C)] 98  F (36.7  C)  Heart Rate:  [68] 68  Resp:  [16-20] 16  BP: (135)/(64) 135/64  SpO2:  [95 %-100 %] 95 %  I/O last 3 completed shifts:  In: 720 [P.O.:720]  Out: 100 [Urine:100]    Constitutional:  NAD, cachetic  Neuropsyche:  alert and oriented, answers questions appropriately.   Respiratory:  Breathing comfortably but has notable pursed lip breathing, marked decreased air exchange, with some scattered expiratory wheezes.   Cardiovascular:  Regular rate and rhythm, no edema.  GI:  soft, NT/ND, BS normal  Skin/Integumen: No acute rash or sign of bleeding.     Medications   All medications reviewed on 07/27/18        amLODIPine  5 mg Oral BID     budesonide  0.5 mg Nebulization BID     ipratropium - albuterol 0.5 mg/2.5 mg/3 mL  3 mL Nebulization 4x daily     predniSONE  40 mg Oral Daily     senna-docusate  1 tablet Oral BID    Or     senna-docusate  2 tablet Oral BID       Data     Recent Labs  Lab 07/26/18  0435 07/26/18  0200 07/25/18  2000 07/25/18  1605   WBC 6.6  --   --  12.1*   HGB 9.6*  --   --  10.4*   *  --   --  109*     --   --  244     --   --  142   POTASSIUM 4.4  --   --  3.5   CHLORIDE 96  --   --  93*   CO2 >45*  --   --  >45*   BUN 20  --   --  23   CR 0.30*  --   --  0.38*   ANIONGAP Not Calculated  --   --  Not Calculated   ISADORA 8.2*  --   --  8.6   *  --   --  246*   ALBUMIN  --   --   --  2.8*   PROTTOTAL  --   --   --  7.8   BILITOTAL  --   --   --  0.3   ALKPHOS  --   --   --  74   ALT  --   --   --  24   AST  --    --   --  18   TROPI  --  0.281* 0.298* 0.339*       No results found for this or any previous visit (from the past 24 hour(s)).

## 2018-07-27 NOTE — CONSULTS
"Cardiology Consultation      Kristine L Soulier MRN# [id number]   YOB: 1945 Age: 73 year old          Assessment and Plan:     1. COPD - has elected palliative care.  2. Hypertension - recommend therapy as could ease breathing if hypertensive component.  SBP's 150 to 180 mmHg and recommend amlodipine or ARB to treat.  3. Elevated troponin - plateau pattern may be RV strain or hypertension; cannot rule out underlying CAD but she has chosen not to pursue further aggressive therapies.           History of Present Illness:     Ms. Kristine Soulier is a 73 year old woman with O2 dependent COPD. Her history is now of end-stage COPD on chronic oxygen.    She was again admitted after having some respiratory distress potentially related to a pneumonia and/or COPD exacerbation.  She has mild chest tightness; that is not new.  She has noted over the past months at times when her breathing is worse.  She has no significant ischemic ECG changes.      A troponin was checked and was slightly elevated at 0.3; that has been true of her prior admissions. She notes that she can breathe in the hospital but goes home and cannot exert herself due to dyspnea. She has been noted to be hypercarbic on a venous blood gas at 122 (40-50).     She has been admitted 3 times in the last month.  Echo's show normal LV function.    She notes that she is at \"the end\".  She has elected palliative care after speaking to her children.  She notes \"finances\" are also a concern.              Past Medical History:     Past Medical History:   Diagnosis Date     Allergic rhinitis      Anxiety      Arthritis      COPD (chronic obstructive pulmonary disease) (H)      Former smoker     quit 01/2014     HTN, goal below 140/80              Past Surgical History:     Past Surgical History:   Procedure Laterality Date     BRONCHOSCOPY FLEXIBLE  4/16/2014    Procedure: bronchoscopy;  Surgeon: Ken Thomson MD;  Location:  GI     BRONCHOSCOPY " FLEXIBLE AND RIGID  4/17/2014    Procedure: bronchoscopy;  Surgeon: Ken Thomson MD;  Location:  GI     BRONCHOSCOPY FLEXIBLE AND RIGID  4/18/2014    Procedure: bronchoscopy;  Surgeon: Ken Thomson MD;  Location:  GI     HC COLONOSCOPY THRU STOMA, DIAGNOSTIC  2/26/10    with Colon Rectal spec. at [facility], repeat 10 yrs.               Social History:     Social History   Substance Use Topics     Smoking status: Former Smoker     Packs/day: 0.25     Years: 20.00     Types: Cigarettes     Quit date: 1/29/2014     Smokeless tobacco: Former User     Alcohol use No             Family History:     Family History   Problem Relation Age of Onset     Asthma Mother      Diabetes Mother      Arthritis Mother      Circulatory Mother      blood clots, varicose veins     Genitourinary Problems Mother      kidney problems     Respiratory Mother      emphysema     Alcohol/Drug Father      alcohol     Arthritis Maternal Aunt      Arthritis Other      self             Allergies:   No Known Allergies          Medications:     Prescriptions Prior to Admission   Medication Sig Dispense Refill Last Dose     ACETAMINOPHEN PO Take 1,000 mg by mouth 2 times daily as needed     at PRN     albuterol (PROAIR HFA/PROVENTIL HFA/VENTOLIN HFA) 108 (90 Base) MCG/ACT Inhaler Inhale 2 puffs into the lungs every 6 hours as needed for shortness of breath / dyspnea or wheezing  0 7/25/2018 at am     BROVANA 15 MCG/2ML NEBU Take 15 mcg by nebulization 2 times daily    7/25/2018 at am     budesonide (PULMICORT) 0.5 MG/2ML nebulizer solution Take 0.5 mg by nebulization 2 times daily    7/25/2018 at am     calcium carbonate-vitamin D 600-200 MG-UNIT TABS Take 1 tablet by mouth daily   0 7/25/2018 at am     Cholecalciferol (VITAMIN D PO) Take 1 tablet by mouth daily Strength unknown   7/25/2018 at am     hydrOXYzine (ATARAX) 10 MG tablet Take 1-2 tablets (10-20 mg) by mouth 3 times daily as needed for anxiety 60 tablet 0       "ipratropium - albuterol 0.5 mg/2.5 mg/3 mL (DUONEB) 0.5-2.5 (3) MG/3ML neb solution Take 1 vial (3 mLs) by nebulization every 6 hours 360 mL 0  at PRN     predniSONE (DELTASONE) 10 MG tablet Take 1 tablet (10 mg) by mouth daily 30 tablet 0 7/25/2018 at am     ORDER FOR DME Equipment being ordered: Oxygen portable, O2 delivered at 3L and may wean O2 to 2.5 L as tolerated 1 Device 0 Taking     ORDER FOR DME Equipment being ordered: Nebulizer machine and supplies    [facility]   [phone]  Fax: [phone] 1 Device 0 Taking             Review of Systems:   Review of Systems is limited to HPI.            Physical Exam:   Vitals were reviewed  Blood pressure (!) 166/92, pulse 107, temperature 98.2  F (36.8  C), temperature source Oral, resp. rate 20, height 1.6 m (5' 3\"), weight 42.9 kg (94 lb 9.6 oz), SpO2 99 %.  94 lbs 9.6 oz    Constitutional: awake, alert, cooperative, cachectic, O2 in place  Neck: thyroid not enlarged and no tenderness Lungs: Mildly increased work of breathing, good air exchange, clear to auscultation bilaterally, no crackles or wheezing; on O2, needs to inhale to speak  Cardiovascular: regular rate and rhythm, increased S1 and S2, no S3 or S4, and no murmur noted  Neurologic: Mental Status Exam:  Orientation:   person, place, time  Motor Exam:  moves all extremities well and symmetrically  Skin: pale, warm, thinning         Data:        Lab Results   Component Value Date     07/26/2018    Lab Results   Component Value Date    CHLORIDE 96 07/26/2018    Lab Results   Component Value Date    BUN 20 07/26/2018      Lab Results   Component Value Date    POTASSIUM 4.4 07/26/2018    Lab Results   Component Value Date    CO2 >45 07/26/2018    Lab Results   Component Value Date    CR 0.30 07/26/2018        Lab Results   Component Value Date    WBC 6.6 07/26/2018    HGB 9.6 (L) 07/26/2018    HCT 34.6 (L) 07/26/2018     (H) 07/26/2018     07/26/2018     No results found for: " INR  Lab Results   Component Value Date    TSH 1.15 07/08/2015     Lab Results   Component Value Date    TROPI 0.281 () 07/26/2018     Lab Results   Component Value Date    TROPI 0.281 () 07/26/2018    TROPI 0.298 () 07/25/2018    TROPI 0.339 () 07/25/2018    TROPI 0.284 () 07/07/2018    TROPI 0.294 () 07/07/2018

## 2018-07-27 NOTE — PLAN OF CARE
Problem: Patient Care Overview  Goal: Plan of Care/Patient Progress Review  Outcome: No Change  Pt arrived on unit at 2000.  A&Ox4.  Using 6L oxygen via oxymizer.  Comfort cares.  Denies pain.  Up w/SBA.  Voiding adequately.  PIV SL.  Regular diet.  Nursing to continue to monitor.

## 2018-07-27 NOTE — PLAN OF CARE
Problem: Patient Care Overview  Goal: Plan of Care/Patient Progress Review  Outcome: No Change  Pt on comfort cares. O2 high 90s on 6L oxymizer cannula, 8L with activity. A&Ox4. Calm and pleasant. UW1 with a walker to the bathroom. Denies pain. Tolerating reg diet, fair appetite. Will be discharging on hospice, pending placement, SW following. Pt and family meeting with  hospice @ 1300 today. RN will cont to monitor.

## 2018-07-28 NOTE — DISCHARGE SUMMARY
[facility]    Discharge Summary  Hospitalist    Date of Admission:  7/25/2018  Date of Discharge:  7/28/2018  5:01 PM  Discharging Provider: Brina Traylor  Date of Service (when I saw the patient): 07/28/18    History of Present Illness   Ms. Kristine Soulier is a delightful 73-year-old  lady with a past medical history notable for end-stage steroid and oxygen dependent chronic obstructive pulmonary disease, hypertension, rheumatoid arthritis, anxiety and chronic anemia, who has presented with increasing shortness of breath and wheezing for 1 day.     Hospital Course      Organized by Discharge Diagnosis  Kristine L Soulier was admitted on 7/25/2018.  The following problems were addressed during her hospitalization:    Acute exacerbation of COPD with acute on chronic hypoxic and hypercapnic respiratory failure.  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   The patient has presented with 1 day of increasing wheezing and shortness of breath.  At home, she is on 4 liters of oxygen at rest and 8 liters of oxygen with activities.  In the ER, she is requiring 10 liters per minute of O2 via oximyzer and saturating around 94%.  The chest x-ray showed emphysematous changes with hyperexpansion of the lungs and flattening of the hemidiaphragm and improvement of right lung infiltrate. VBG pH of 7.24, pCO2 of 122 and pO2 of 33. Patient was not able to tolerate BiPAP due to claustrophobia.  She will be admitted to [facility], and treated with high flow oxygen, bronchodilators and steroids, DuoNeb every 6 hours as well as p.r.n. Albuterol, as well as prednisone taper.      Goals of Care   Transition to Comfort Cares on 07/26/18 Notably, she was recently hospitalized from 07/06 through 07/12 for chronic obstructive pulmonary disease exacerbation due to multilobar pneumonia and treated with antibiotic and discharged on oral cefdinir.  Given her  advanced chronic obstructive pulmonary disease and multiple hospitalizations for acute COPD exacerbation, and was receptive to a more palliative approach.  Appreciate palliative care consult on 07/26/18. Patient appropriately decided to transition to comfort care approach. She is DNR/DNI. She unfortunately declined overnight and on 07/28/18, and passed away that afternoon.      OTHER ISSUES DURING HER SAY:   Demand ischemia -  Troponin I is elevated at 0.339 at admission and downtrending.  She reported no chest pain. EKG showed NSR and minimal voltage pressure for left ventricular hypertrophy. During the most recent hospitalizations, the patient had also elevated troponin I. The echocardiogram on 07/07/2018 showed ejection fraction of 60%-65% and no wall motion abnormality.  Previously she also had a normal dobutamine stress echo on 06/18/2018.   - I do not suspect ACS and given goals of care > stopped heparin, ASA and diltiazem.  - I see cardiology saw patient. Unfortunately this was after I had discontinued the ordered and called to ensure it would be communicated to cardiologist. Thank you for seeing the patient. I apologize for the mis-communication.      Hyperglycemia:  Blood glucose is 246 and elevated.  This was likely steroid mediated.  - BS checks stopped with transition to comfort cares.      Hypertension:   - Noted on admission. Will not treat given goals of care or comfort.       Brina Traylor    Significant Results and Procedures   No procedures.   Imaging as outlined below.       Pending Results     Unresulted Labs Ordered in the Past 30 Days of this Admission     Date and Time Order Name Status Description    7/25/2018 1653 Blood culture Preliminary     7/25/2018 1557 Blood culture Preliminary           Code Status   DNR / DNI       Primary Care Physician   Carlos Alberto Nelson MD    Physical Exam   During visit earlier in the day patient had periods of apnea, cold extremities. No longer  interacting with environment. Appeared comfortable. Family at her bedside.   Examined after she passed away as well and pronounced her passing.     Discharge Disposition   Patient  during this admission  Condition at discharge:     Consultations This Hospital Stay   PULMONARY IP CONSULT  PHYSICAL THERAPY ADULT IP CONSULT  PALLIATIVE CARE ADULT IP CONSULT  PHARMACY TO DOSE HEPARIN  CARDIOLOGY IP CONSULT  WOUND OSTOMY CONTINENCE NURSE  IP CONSULT  SOCIAL WORK IP CONSULT    Time Spent on this Encounter   IBrina, personally saw the patient today and spent greater than 30 minutes discharging this patient.    Discharge Orders   No discharge procedures on file.  Discharge Medications   N/A     Allergies   No Known Allergies  Data     IMAGING RESULTS FROM THIS HOSPITAL STAY:  Results for orders placed or performed during the hospital encounter of 18   XR Chest 2 Views    Narrative    CHEST TWO VIEW   2018 4:37 PM     HISTORY: Short of breath, hypoxic.     COMPARISON: Chest x-ray 2018.      Impression    IMPRESSION: Two views of the chest are performed. Emphysema is again  noted with hyperexpansion of the lungs and flattening of the  hemidiaphragms. Right lung base infiltrate is slightly improved but  has not resolved. Chronic interstitial changes are noted within both  lungs which appears stable. No pneumothorax. Possible trace right  pleural effusion is unchanged.    PAUL HENRIQUEZ MD       MOST RECENT LAB RESULTS:  Results for SOULIER, KRISTINE L (MRN [id number]) as of 2018 17:03   Ref. Range 2018 16:18   Ph Venous Latest Ref Range: 7.32 - 7.43 pH 7.24 (L)   PCO2 Venous Latest Ref Range: 40 - 50 mm Hg 122 (HH)   PO2 Venous Latest Ref Range: 25 - 47 mm Hg 33   Bicarbonate Venous Latest Ref Range: 21 - 28 mmol/L 53 (HH)   Base Excess Venous Latest Units: mmol/L 20.5       Most Recent 3 CBC's:  Recent Labs   Lab Test  18   0435  18   1605  18   0610   WBC  6.6   12.1*  7.9   HGB  9.6*  10.4*  9.5*   MCV  108*  109*  106*   PLT  205  244  272      Most Recent 3 BMP's:  Recent Labs   Lab Test  07/26/18   0435  07/25/18   1605  07/07/18   0610   NA  141  142  141   POTASSIUM  4.4  3.5  4.8   CHLORIDE  96  93*  98   CO2  >45*  >45*  39*   BUN  20  23  17   CR  0.30*  0.38*  0.40*   ANIONGAP  Not Calculated  Not Calculated  4   ISADORA  8.2*  8.6  8.3*   GLC  127*  246*  128*     Most Recent 3 Troponin's:  Recent Labs   Lab Test  07/26/18   0200  07/25/18   2000  07/25/18   1605   TROPI  0.281*  0.298*  0.339*     Most Recent 3 INR's:No lab results found.  Most Recent 2 LFT's:  Lab Test  07/25/18   1605   AST  18   ALT  24   ALKPHOS  74   BILITOTAL  0.3     Most Recent 6 Bacteria Isolates From Any Culture (See EPIC Reports for Culture Details):  Lab Test  07/25/18   1739  07/25/18   1659  07/25/18   1605   CULT  No growth after 3 days  No growth  No growth after 3 days     Most Recent TSH, T4 and HgbA1c:   Lab Test  07/25/18   1605   TSH   --    A1C  5.5

## 2018-07-28 NOTE — PROGRESS NOTES
Patient pronounced  at 1550 by Dr. Brina Traylor. Donor line contacted and will contact family by telephone.  ANS/ nursing supervisor notified. Family is unsure of  home arrangements. ANS number provided to next of kin Laura Soulier. All belongings sent with family other then dentures which are still in the patients mouth. Security notified of need [place] [facility] and [place] was sent with security.

## 2018-07-28 NOTE — PROGRESS NOTES
Called to patient bedside to pronounce death.   She had no spontaneous respirations.   No cardiac activity auscultative.   Time of death 13:50.

## 2018-07-28 NOTE — PLAN OF CARE
Problem: Patient Care Overview  Goal: Plan of Care/Patient Progress Review  Outcome: No Change  Pt orientedx4. on comfort cares. O2 high 90s on 6L oxymizer cannula, 8L with activity. Pleasant and calm. A-1 with a walker to the bathroom. Denies pain. Tolerating reg diet. Discharge into hospice OLOP probable on Monday. Coccyx redness covered with mepilex. will continue to monitor.

## 2018-07-28 NOTE — PROGRESS NOTES
[facility]    Palliative Care Progress Note    Kristine L Soulier  MRN# [id number]  Date of Admission:  7/25/2018  Date of Service (when I saw the patient): 07/28/2018    Assessment & Plan   Kristine L Soulier is a 73 year old female with PMH significant for end stage COPD, on comfort measures only, with acute decline overnight, currently actively dying    Symptoms/Recommendations  - plan to stay in hospital on comfort care, anticipate pt will die likely today, family wishing not to prolong her dying process and so once everyone gets here plan to use comfort medications and wean off oxygen support. Plan discussed with bedside RN and hospitalist.     - suctioned oral secretions and then had RN give robinul and morphine now for comfort    - changed comfort medications (morphine, lorazepam, haloperidol) to IV    Support/Coping  Family appropriately grieving, but supportive of each other and of the plan. Declined  support today.        Decisional Support, Goals of Care, Counseling & Coordination  Decisional Capacity Intact?  -no  Health Care Directive on File?  -  Code Status/Resuscitation Preferences?  -DNR/DNI  Plan of Care?    Discussion  Met with family. Updated them about acute decline in respiratory status overnight and now currently at 100%HFNC. Explained that clinically appears to be dying in next hours to day--currently unresponsive and with agonal breathing pattern. Family not wanting to prolong the dying process and once everyone gets here plan to use comfort meds and wean off oxygen support.     Thank you for involving us in the care of this patient and family. We will continue to follow. Please do not hesitate to contact me with questions or concerns or the on-call provider for our team if evening or weekend.      Total time spent was 80 minutes,  >50% of time was spent counseling and/or coordination of care regarding comfort measures, family counseling and support.    FTF time  1215-2647, [id number] additional time with chart review, documentation and coordination with primary team.     Emmy Montiel MD / Palliative Medicine / Pager [phone]         Interval History   Transitioned to comfort care on 7/26 with plan to discharge to [facility]. However overnight had acute decline in respiratory status, RT was called, provided nebs and started on HFNC up to 100% FiO2. Was given morphine and ativan for comfort.  Over course of night pt became unresponsive. Family called in early AM.    Family at bedside - pt unresponsive, copious oral secretions pooling in mouth and agonal breathing pattern. No grimacing or vocalizations. RR 20's.       Medications    Of note  Bid budesonide nebs  QID dioneb  Prednisone 40mg daily  Senna-S -BId, none since yesterday AM      PRN  Lorazepam 0.5mg x 2 o/n, last dose 2am  Morphine 5mg x 2 o/n, last dose 642am  Not using: acetaminophen, robinul, haldol, albuterol, atropine, melatonin, ondansetron, miralax, compazine    Current Facility-Administered Medications Ordered in Epic   Medication Dose Route Frequency Last Rate Last Dose     acetaminophen (TYLENOL) tablet 650 mg  650 mg Oral Q4H PRN         albuterol (PROVENTIL) neb solution 2.5 mg  2.5 mg Nebulization Q2H PRN         atropine 1 % ophthalmic solution 1-2 drop  1-2 drop Sublingual Q1H PRN         [START ON 7/29/2018] bisacodyl (DULCOLAX) Suppository 10 mg  10 mg Rectal Once PRN         budesonide (PULMICORT) neb solution 0.5 mg  0.5 mg Nebulization BID   0.5 mg at 07/28/18 0710     glycopyrrolate (ROBINUL) injection 0.2-0.4 mg  0.2-0.4 mg Intravenous Q4H PRN         haloperidol (HALDOL) 2 MG/ML (HIGH CONC) solution 0.5-1 mg  0.5-1 mg Oral Q6H PRN         ipratropium - albuterol 0.5 mg/2.5 mg/3 mL (DUONEB) neb solution 3 mL  3 mL Nebulization 4x daily   3 mL at 07/28/18 0710     LORazepam (ATIVAN) 1 mg/0.5 mL (HIGH CONC) solution 0.5-1 mg  0.5-1 mg Oral Q3H PRN   0.5 mg at 07/28/18 0156      melatonin tablet 1 mg  1 mg Oral At Bedtime PRN         morphine sulfate HIGH CONCENTRATE (ROXANOL) 10 mg/0.5 mL (HIGH CONC) solution 5-10 mg  5-10 mg Oral Q2H PRN   5 mg at 07/28/18 0642     ondansetron (ZOFRAN-ODT) ODT tab 4 mg  4 mg Oral Q6H PRN        Or     ondansetron (ZOFRAN) injection 4 mg  4 mg Intravenous Q6H PRN         polyethylene glycol (MIRALAX/GLYCOLAX) Packet 17 g  17 g Oral Daily PRN         predniSONE (DELTASONE) tablet 40 mg  40 mg Oral Daily   40 mg at 07/27/18 0801     prochlorperazine (COMPAZINE) injection 5 mg  5 mg Intravenous Q6H PRN        Or     prochlorperazine (COMPAZINE) tablet 5 mg  5 mg Oral Q6H PRN        Or     prochlorperazine (COMPAZINE) Suppository 12.5 mg  12.5 mg Rectal Q12H PRN         senna-docusate (SENOKOT-S;PERICOLACE) 8.6-50 MG per tablet 1 tablet  1 tablet Oral BID   1 tablet at 07/27/18 0802    Or     senna-docusate (SENOKOT-S;PERICOLACE) 8.6-50 MG per tablet 2 tablet  2 tablet Oral BID         No current [facility]-ordered outpatient prescriptions on file.       Physical Exam             Resp: 20 SpO2: 98 % O2 Device: High Flow Nasal Cannula (HFNC) Oxygen Delivery: Other (Comments) (50L)  Vitals:    07/25/18 1540 07/26/18 0600   Weight: 45.4 kg (100 lb) 42.9 kg (94 lb 9.6 oz)     Gen:lying in bed. Appears somnolent, agonal breathing,  HEENT: NCAT. Eyes closed.  copious oral secretions pooling in mouth  CV: palpable radial pulse, 120 beats per min, Peripheral perfusion intact.   Resp: agonal breathing pattern, RR 24, course respiratory sounds but no audible upper airway secretions  Abd: soft, nt, nd  Msk: no gross deformity, + sarcopenia  Skin:  Warm and diaphoretic   Ext: warm, well perfused.   Neuro: unresponsive      Data   No results found for this or any previous visit (from the past 24 hour(s)).  Last GFR >90

## 2018-07-28 NOTE — DISCHARGE SUMMARY
[facility]    Discharge Summary  Hospitalist    Date of Admission:  7/25/2018  Date of Discharge:  7/28/2018  5:01 PM  Discharging Provider: Brina Traylor  Date of Service (when I saw the patient): 07/28/18    History of Present Illness   Ms. Kristine Soulier is a delightful 73-year-old  lady with a past medical history notable for end-stage steroid and oxygen dependent chronic obstructive pulmonary disease, hypertension, rheumatoid arthritis, anxiety and chronic anemia, who has presented with increasing shortness of breath and wheezing for 1 day.     Hospital Course      Organized by Discharge Diagnosis  Kristine L Soulier was admitted on 7/25/2018.  The following problems were addressed during her hospitalization:    Acute exacerbation of COPD with acute on chronic hypoxic and hypercapnic respiratory failure.  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   The patient has presented with 1 day of increasing wheezing and shortness of breath.  At home, she is on 4 liters of oxygen at rest and 8 liters of oxygen with activities.  In the ER, she is requiring 10 liters per minute of O2 via oximyzer and saturating around 94%.  The chest x-ray showed emphysematous changes with hyperexpansion of the lungs and flattening of the hemidiaphragm and improvement of right lung infiltrate. VBG pH of 7.24, pCO2 of 122 and pO2 of 33. Patient was not able to tolerate BiPAP due to claustrophobia.  She will be admitted to [facility] [place], and treated with high flow oxygen, bronchodilators and steroids, DuoNeb every 6 hours as well as p.r.n. Albuterol, as well as prednisone taper.      Goals of Care   Transition to Comfort Cares on 07/26/18 Notably, she was recently hospitalized from 07/06 through 07/12 for chronic obstructive pulmonary disease exacerbation due to multilobar pneumonia and treated with antibiotic and discharged on oral cefdinir.  Given her  advanced chronic obstructive pulmonary disease and multiple hospitalizations for acute COPD exacerbation, and was receptive to a more palliative approach.  Appreciate palliative care consult on 07/26/18. Patient appropriately decided to transition to comfort care approach. She is DNR/DNI. She unfortunately declined overnight and on 07/28/18, and passed away that afternoon.      OTHER ISSUES DURING HER SAY:   Demand ischemia -  Troponin I is elevated at 0.339 at admission and downtrending.  She reported no chest pain. EKG showed NSR and minimal voltage pressure for left ventricular hypertrophy. During the most recent hospitalizations, the patient had also elevated troponin I. The echocardiogram on 07/07/2018 showed ejection fraction of 60%-65% and no wall motion abnormality.  Previously she also had a normal dobutamine stress echo on 06/18/2018.   - I do not suspect ACS and given goals of care > stopped heparin, ASA and diltiazem.  - I see cardiology saw patient. Unfortunately this was after I had discontinued the ordered and called to ensure it would be communicated to cardiologist. Thank you for seeing the patient. I apologize for the mis-communication.      Hyperglycemia:  Blood glucose is 246 and elevated.  This was likely steroid mediated.  - BS checks stopped with transition to comfort cares.      Hypertension:   - Noted on admission. Will not treat given goals of care or comfort.       Brina Traylor    Significant Results and Procedures   No procedures.   Imaging as outlined below.       Pending Results     Unresulted Labs Ordered in the Past 30 Days of this Admission     Date and Time Order Name Status Description    7/25/2018 1653 Blood culture Preliminary     7/25/2018 1557 Blood culture Preliminary           Code Status   DNR / DNI       Primary Care Physician   Carlos Alberto Nelson MD    Physical Exam   During visit earlier in the day patient had periods of apnea, cold extremities. No longer  interacting with environment. Appeared comfortable. Family at her bedside.   Examined after she passed away as well and pronounced her passing.     Discharge Disposition   Patient  during this admission  Condition at discharge:     Consultations This Hospital Stay   PULMONARY IP CONSULT  PHYSICAL THERAPY ADULT IP CONSULT  PALLIATIVE CARE ADULT IP CONSULT  PHARMACY TO DOSE HEPARIN  CARDIOLOGY IP CONSULT  WOUND OSTOMY CONTINENCE NURSE  IP CONSULT  SOCIAL WORK IP CONSULT    Time Spent on this Encounter   IBrina, personally saw the patient today and spent greater than 30 minutes discharging this patient.    Discharge Orders   No discharge procedures on file.  Discharge Medications   N/A     Allergies   No Known Allergies  Data     IMAGING RESULTS FROM THIS HOSPITAL STAY:  Results for orders placed or performed during the hospital encounter of 18   XR Chest 2 Views    Narrative    CHEST TWO VIEW   2018 4:37 PM     HISTORY: Short of breath, hypoxic.     COMPARISON: Chest x-ray 2018.      Impression    IMPRESSION: Two views of the chest are performed. Emphysema is again  noted with hyperexpansion of the lungs and flattening of the  hemidiaphragms. Right lung base infiltrate is slightly improved but  has not resolved. Chronic interstitial changes are noted within both  lungs which appears stable. No pneumothorax. Possible trace right  pleural effusion is unchanged.    PAUL HENRIQUEZ MD       MOST RECENT LAB RESULTS:  Results for SOULIER, KRISTINE L (MRN [id number]) as of 2018 17:03   Ref. Range 2018 16:18   Ph Venous Latest Ref Range: 7.32 - 7.43 pH 7.24 (L)   PCO2 Venous Latest Ref Range: 40 - 50 mm Hg 122 (HH)   PO2 Venous Latest Ref Range: 25 - 47 mm Hg 33   Bicarbonate Venous Latest Ref Range: 21 - 28 mmol/L 53 (HH)   Base Excess Venous Latest Units: mmol/L 20.5       Most Recent 3 CBC's:  Recent Labs   Lab Test  18   0435  18   1605  18   0610   WBC  6.6   12.1*  7.9   HGB  9.6*  10.4*  9.5*   MCV  108*  109*  106*   PLT  205  244  272      Most Recent 3 BMP's:  Recent Labs   Lab Test  07/26/18   0435  07/25/18   1605  07/07/18   0610   NA  141  142  141   POTASSIUM  4.4  3.5  4.8   CHLORIDE  96  93*  98   CO2  >45*  >45*  39*   BUN  20  23  17   CR  0.30*  0.38*  0.40*   ANIONGAP  Not Calculated  Not Calculated  4   ISADORA  8.2*  8.6  8.3*   GLC  127*  246*  128*     Most Recent 3 Troponin's:  Recent Labs   Lab Test  07/26/18   0200  07/25/18   2000  07/25/18   1605   TROPI  0.281*  0.298*  0.339*     Most Recent 3 INR's:No lab results found.  Most Recent 2 LFT's:  Lab Test  07/25/18   1605   AST  18   ALT  24   ALKPHOS  74   BILITOTAL  0.3     Most Recent 6 Bacteria Isolates From Any Culture (See EPIC Reports for Culture Details):  Lab Test  07/25/18   1739  07/25/18   1659  07/25/18   1605   CULT  No growth after 3 days  No growth  No growth after 3 days     Most Recent TSH, T4 and HgbA1c:   Lab Test  07/25/18   1605   TSH   --    A1C  5.5

## 2018-07-28 NOTE — PROGRESS NOTES
Pt getting increasing anxious, and dyspneic with accessory muscle/abdominal muscle use. Respiration in the 40s. O2 sats dropped to 73 when writer spot checked at 0330. Ativan given for restlessness, morphine given to help with the breathing.  RT called for PRN neb tx which pt did not tolerate. Pt currently on high flow nasal canula at 100% FiO2, sats better after 0500. But pt still not responsive. RN notified daughter regarding change in status of pt. Pt said will contact other family.   0545 Son called to get update on pt.

## 2018-07-28 NOTE — PLAN OF CARE
Problem: Patient Care Overview  Goal: Plan of Care/Patient Progress Review  Outcome: Declining  Pt status deteriorated overnight. Pt getting increasing anxious, dyspneic, with increased respirations and drop in O2 sats. Ativan given for restlessness, morphine given to help with the breathing. RT called for PRN neb tx which pt did not tolerate. Pt currently on high flow nasal canula at 100% FiO2, sats better after 0500. But pt still not responsive. Family notified regarding change in pt status. family at bedside. Will continue to monitor.

## 2018-07-28 NOTE — PLAN OF CARE
Pt appears calm, RR 17, shallow, irregular. Family remains at bedside, ready to wean off O2. IV Morphine given per orders, IV Robinul x1 and oral suction for secretions. O2 weaned off, breathing agonal with periods of apnea. Nursing will continue to monitor.

## 2018-07-28 NOTE — PLAN OF CARE
Problem: Patient Care Overview  Goal: Plan of Care/Patient Progress Review  Outcome: Declining  Pt unresponsive, somnolent this shift. Comfort cares. RR 20's-30's laboured with short periods of apnea. PRN Morphine x1 with good effect. Oral suctioning and IV Robinul x1 for secretions with relief. On high flow nasal canula @50 LPM and 100% FiO2. Palliative care consulted. Family at bedside, plans to wean off O2 support per family cue upon [place] of pt's [place]. T/R for comfort. Nursing will continue to monitor.

## 2018-07-28 NOTE — PROGRESS NOTES
Called to pt room by RN for prn neb treatment. Pt on 12 lpm oxymizer cannula, SpO2 87%, RR 40's on RT arrival. Neb given, SpO2 dropped to 81%. Pt placed back on 15 lpm oxymizer with no improvement in SpO2. Pt placed on HFNC 45 lpm 100% FiO2. Pt claustrophobic does not tolerate Bipap mask per RN. Will continue to follow.     Hector Singh RT

## 2018-07-30 DIAGNOSIS — Z53.9 DIAGNOSIS NOT YET DEFINED: Primary | ICD-10-CM

## 2018-07-31 LAB
BACTERIA SPEC CULT: NO GROWTH
BACTERIA SPEC CULT: NO GROWTH
Lab: NORMAL
Lab: NORMAL
SPECIMEN SOURCE: NORMAL
SPECIMEN SOURCE: NORMAL

## (undated) RX ORDER — DOBUTAMINE HYDROCHLORIDE 200 MG/100ML
INJECTION INTRAVENOUS
Status: DISPENSED
Start: 2018-01-01